# Patient Record
Sex: MALE | Race: WHITE | NOT HISPANIC OR LATINO
[De-identification: names, ages, dates, MRNs, and addresses within clinical notes are randomized per-mention and may not be internally consistent; named-entity substitution may affect disease eponyms.]

---

## 2017-08-01 PROBLEM — Z00.00 ENCOUNTER FOR PREVENTIVE HEALTH EXAMINATION: Status: ACTIVE | Noted: 2017-08-01

## 2017-08-08 ENCOUNTER — RECORD ABSTRACTING (OUTPATIENT)
Age: 77
End: 2017-08-08

## 2017-08-08 DIAGNOSIS — Z78.9 OTHER SPECIFIED HEALTH STATUS: ICD-10-CM

## 2017-08-08 DIAGNOSIS — Z82.49 FAMILY HISTORY OF ISCHEMIC HEART DISEASE AND OTHER DISEASES OF THE CIRCULATORY SYSTEM: ICD-10-CM

## 2017-08-08 DIAGNOSIS — Z98.890 OTHER SPECIFIED POSTPROCEDURAL STATES: ICD-10-CM

## 2017-08-08 DIAGNOSIS — Z95.0 PRESENCE OF CARDIAC PACEMAKER: ICD-10-CM

## 2017-08-08 DIAGNOSIS — F15.90 OTHER STIMULANT USE, UNSPECIFIED, UNCOMPLICATED: ICD-10-CM

## 2017-08-15 ENCOUNTER — APPOINTMENT (OUTPATIENT)
Dept: INTERNAL MEDICINE | Facility: CLINIC | Age: 77
End: 2017-08-15
Payer: COMMERCIAL

## 2017-08-15 VITALS
DIASTOLIC BLOOD PRESSURE: 68 MMHG | HEART RATE: 60 BPM | WEIGHT: 194 LBS | SYSTOLIC BLOOD PRESSURE: 140 MMHG | RESPIRATION RATE: 18 BRPM | BODY MASS INDEX: 31.18 KG/M2 | OXYGEN SATURATION: 98 % | HEIGHT: 66 IN | TEMPERATURE: 98.7 F

## 2017-08-15 PROCEDURE — 99214 OFFICE O/P EST MOD 30 MIN: CPT

## 2017-08-15 RX ORDER — DONEPEZIL HYDROCHLORIDE 5 MG/1
5 TABLET ORAL
Refills: 0 | Status: DISCONTINUED | COMMUNITY
End: 2017-07-31

## 2017-08-15 RX ORDER — SULFAMETHOXAZOLE AND TRIMETHOPRIM 800; 160 MG/1; MG/1
800-160 TABLET ORAL
Qty: 14 | Refills: 0 | Status: COMPLETED | COMMUNITY
Start: 2017-07-19

## 2017-08-15 RX ORDER — LISINOPRIL 2.5 MG/1
2.5 TABLET ORAL
Refills: 0 | Status: DISCONTINUED | COMMUNITY
End: 2017-08-15

## 2017-09-18 ENCOUNTER — LABORATORY RESULT (OUTPATIENT)
Age: 77
End: 2017-09-18

## 2017-09-18 ENCOUNTER — APPOINTMENT (OUTPATIENT)
Dept: INTERNAL MEDICINE | Facility: CLINIC | Age: 77
End: 2017-09-18
Payer: MEDICARE

## 2017-09-18 VITALS
BODY MASS INDEX: 31.66 KG/M2 | HEIGHT: 66 IN | OXYGEN SATURATION: 96 % | DIASTOLIC BLOOD PRESSURE: 70 MMHG | HEART RATE: 60 BPM | TEMPERATURE: 98.2 F | WEIGHT: 197 LBS | RESPIRATION RATE: 18 BRPM | SYSTOLIC BLOOD PRESSURE: 140 MMHG

## 2017-09-18 PROCEDURE — 36415 COLL VENOUS BLD VENIPUNCTURE: CPT

## 2017-09-18 PROCEDURE — 99214 OFFICE O/P EST MOD 30 MIN: CPT | Mod: 25

## 2017-09-23 LAB
ANION GAP SERPL CALC-SCNC: 22 MMOL/L
APPEARANCE: ABNORMAL
BASOPHILS # BLD AUTO: 0.01 K/UL
BASOPHILS NFR BLD AUTO: 0.2 %
BILIRUBIN URINE: NEGATIVE
BLOOD URINE: ABNORMAL
BUN SERPL-MCNC: 23 MG/DL
CALCIUM SERPL-MCNC: 9.6 MG/DL
CHLORIDE SERPL-SCNC: 105 MMOL/L
CO2 SERPL-SCNC: 19 MMOL/L
COLOR: YELLOW
CREAT SERPL-MCNC: 1.04 MG/DL
EOSINOPHIL # BLD AUTO: 0.16 K/UL
EOSINOPHIL NFR BLD AUTO: 3 %
GLUCOSE QUALITATIVE U: NORMAL MG/DL
GLUCOSE SERPL-MCNC: 89 MG/DL
HCT VFR BLD CALC: 40.6 %
HGB BLD-MCNC: 12.5 G/DL
IMM GRANULOCYTES NFR BLD AUTO: 0.2 %
KETONES URINE: NEGATIVE
LEUKOCYTE ESTERASE URINE: ABNORMAL
LYMPHOCYTES # BLD AUTO: 1.54 K/UL
LYMPHOCYTES NFR BLD AUTO: 29.3 %
MAN DIFF?: NORMAL
MCHC RBC-ENTMCNC: 30.8 GM/DL
MCHC RBC-ENTMCNC: 32.1 PG
MCV RBC AUTO: 104.1 FL
MONOCYTES # BLD AUTO: 0.53 K/UL
MONOCYTES NFR BLD AUTO: 10.1 %
NEUTROPHILS # BLD AUTO: 3 K/UL
NEUTROPHILS NFR BLD AUTO: 57.2 %
NITRITE URINE: POSITIVE
PH URINE: 7.5
PLATELET # BLD AUTO: 201 K/UL
POTASSIUM SERPL-SCNC: 5.1 MMOL/L
PROTEIN URINE: 30 MG/DL
RBC # BLD: 3.9 M/UL
RBC # FLD: 16.4 %
SODIUM SERPL-SCNC: 146 MMOL/L
SPECIFIC GRAVITY URINE: 1.02
UROBILINOGEN URINE: NORMAL MG/DL
WBC # FLD AUTO: 5.25 K/UL

## 2017-10-23 ENCOUNTER — APPOINTMENT (OUTPATIENT)
Dept: INTERNAL MEDICINE | Facility: CLINIC | Age: 77
End: 2017-10-23
Payer: MEDICARE

## 2017-10-23 VITALS
SYSTOLIC BLOOD PRESSURE: 130 MMHG | OXYGEN SATURATION: 98 % | DIASTOLIC BLOOD PRESSURE: 60 MMHG | HEART RATE: 60 BPM | HEIGHT: 66 IN | RESPIRATION RATE: 18 BRPM | TEMPERATURE: 98 F | WEIGHT: 199 LBS | BODY MASS INDEX: 31.98 KG/M2

## 2017-10-23 PROCEDURE — 90662 IIV NO PRSV INCREASED AG IM: CPT

## 2017-10-23 PROCEDURE — G0008: CPT

## 2017-10-23 PROCEDURE — 36415 COLL VENOUS BLD VENIPUNCTURE: CPT

## 2017-10-23 PROCEDURE — 99213 OFFICE O/P EST LOW 20 MIN: CPT | Mod: 25

## 2017-10-25 LAB
ANION GAP SERPL CALC-SCNC: 13 MMOL/L
BUN SERPL-MCNC: 23 MG/DL
CALCIUM SERPL-MCNC: 9 MG/DL
CHLORIDE SERPL-SCNC: 104 MMOL/L
CO2 SERPL-SCNC: 25 MMOL/L
CREAT SERPL-MCNC: 0.97 MG/DL
GLUCOSE SERPL-MCNC: 85 MG/DL
POTASSIUM SERPL-SCNC: 4.6 MMOL/L
SODIUM SERPL-SCNC: 142 MMOL/L

## 2017-11-05 ENCOUNTER — RX RENEWAL (OUTPATIENT)
Age: 77
End: 2017-11-05

## 2018-01-08 ENCOUNTER — LABORATORY RESULT (OUTPATIENT)
Age: 78
End: 2018-01-08

## 2018-01-08 ENCOUNTER — APPOINTMENT (OUTPATIENT)
Dept: INTERNAL MEDICINE | Facility: CLINIC | Age: 78
End: 2018-01-08
Payer: MEDICARE

## 2018-01-08 VITALS
HEART RATE: 61 BPM | RESPIRATION RATE: 18 BRPM | WEIGHT: 198 LBS | DIASTOLIC BLOOD PRESSURE: 60 MMHG | SYSTOLIC BLOOD PRESSURE: 138 MMHG | TEMPERATURE: 98.2 F | OXYGEN SATURATION: 97 % | BODY MASS INDEX: 31.96 KG/M2

## 2018-01-08 PROCEDURE — 99214 OFFICE O/P EST MOD 30 MIN: CPT | Mod: 25

## 2018-01-08 PROCEDURE — 36415 COLL VENOUS BLD VENIPUNCTURE: CPT

## 2018-01-08 RX ORDER — CIPROFLOXACIN HYDROCHLORIDE 250 MG/1
250 TABLET, FILM COATED ORAL
Qty: 10 | Refills: 1 | Status: COMPLETED | COMMUNITY
Start: 2018-01-08 | End: 2018-01-18

## 2018-01-10 LAB
ANION GAP SERPL CALC-SCNC: 11 MMOL/L
APPEARANCE: ABNORMAL
BASOPHILS # BLD AUTO: 0.02 K/UL
BASOPHILS NFR BLD AUTO: 0.3 %
BILIRUBIN URINE: NEGATIVE
BLOOD URINE: ABNORMAL
BUN SERPL-MCNC: 26 MG/DL
CALCIUM SERPL-MCNC: 9.5 MG/DL
CHLORIDE SERPL-SCNC: 103 MMOL/L
CO2 SERPL-SCNC: 27 MMOL/L
COLOR: ABNORMAL
CREAT SERPL-MCNC: 1.15 MG/DL
EOSINOPHIL # BLD AUTO: 0.17 K/UL
EOSINOPHIL NFR BLD AUTO: 3 %
GLUCOSE QUALITATIVE U: NEGATIVE MG/DL
GLUCOSE SERPL-MCNC: 107 MG/DL
HCT VFR BLD CALC: 40.4 %
HGB BLD-MCNC: 12.5 G/DL
IMM GRANULOCYTES NFR BLD AUTO: 0.3 %
KETONES URINE: ABNORMAL
LEUKOCYTE ESTERASE URINE: ABNORMAL
LYMPHOCYTES # BLD AUTO: 1.78 K/UL
LYMPHOCYTES NFR BLD AUTO: 31.1 %
MAN DIFF?: NORMAL
MCHC RBC-ENTMCNC: 30.9 GM/DL
MCHC RBC-ENTMCNC: 32.6 PG
MCV RBC AUTO: 105.2 FL
MONOCYTES # BLD AUTO: 0.69 K/UL
MONOCYTES NFR BLD AUTO: 12.1 %
NEUTROPHILS # BLD AUTO: 3.04 K/UL
NEUTROPHILS NFR BLD AUTO: 53.2 %
NITRITE URINE: POSITIVE
PH URINE: 6.5
PLATELET # BLD AUTO: 212 K/UL
POTASSIUM SERPL-SCNC: 5.5 MMOL/L
PROTEIN URINE: 30 MG/DL
RBC # BLD: 3.84 M/UL
RBC # FLD: 15.9 %
SODIUM SERPL-SCNC: 141 MMOL/L
SPECIFIC GRAVITY URINE: 1.02
UROBILINOGEN URINE: 1 MG/DL
WBC # FLD AUTO: 5.72 K/UL

## 2018-01-11 ENCOUNTER — MESSAGE (OUTPATIENT)
Age: 78
End: 2018-01-11

## 2018-04-09 ENCOUNTER — APPOINTMENT (OUTPATIENT)
Dept: INTERNAL MEDICINE | Facility: CLINIC | Age: 78
End: 2018-04-09

## 2018-05-26 ENCOUNTER — LABORATORY RESULT (OUTPATIENT)
Age: 78
End: 2018-05-26

## 2018-05-26 ENCOUNTER — APPOINTMENT (OUTPATIENT)
Dept: INTERNAL MEDICINE | Facility: CLINIC | Age: 78
End: 2018-05-26
Payer: MEDICARE

## 2018-05-26 VITALS
HEART RATE: 60 BPM | TEMPERATURE: 98.1 F | SYSTOLIC BLOOD PRESSURE: 118 MMHG | RESPIRATION RATE: 18 BRPM | WEIGHT: 170 LBS | BODY MASS INDEX: 27.32 KG/M2 | HEIGHT: 66 IN | OXYGEN SATURATION: 94 % | DIASTOLIC BLOOD PRESSURE: 60 MMHG

## 2018-05-26 DIAGNOSIS — K21.9 GASTRO-ESOPHAGEAL REFLUX DISEASE W/OUT ESOPHAGITIS: ICD-10-CM

## 2018-05-26 PROCEDURE — 36415 COLL VENOUS BLD VENIPUNCTURE: CPT

## 2018-05-26 PROCEDURE — 99214 OFFICE O/P EST MOD 30 MIN: CPT | Mod: 25

## 2018-05-26 RX ORDER — OMEPRAZOLE 40 MG/1
40 CAPSULE, DELAYED RELEASE ORAL
Qty: 30 | Refills: 1 | Status: COMPLETED | COMMUNITY

## 2018-05-26 RX ORDER — SULFAMETHOXAZOLE AND TRIMETHOPRIM 800; 160 MG/1; MG/1
800-160 TABLET ORAL TWICE DAILY
Qty: 14 | Refills: 0 | Status: COMPLETED | COMMUNITY
Start: 2018-05-26 | End: 2018-06-02

## 2018-05-26 RX ORDER — MULTIVITAMIN WITH FOLIC ACID 400 MCG
TABLET ORAL
Qty: 90 | Refills: 0 | Status: COMPLETED | COMMUNITY
Start: 2018-05-18

## 2018-05-26 NOTE — HEALTH RISK ASSESSMENT
[Two or more falls in past year] : Patient reported two or more falls in the past year [0] : 2) Feeling down, depressed, or hopeless: Not at all (0) [Intercurrent ED visits] : went to ED [Intercurrent hospitalizations] : was admitted to the hospital  [] : No [[id number]] : 0

## 2018-05-26 NOTE — HISTORY OF PRESENT ILLNESS
[Spouse] : spouse [FreeTextEntry1] : Follow up for chronic medical conditions  [de-identified] : Patient presents for follow up for his chronic medical conditions. He reports compliance with all prescribed medical therapy and dietary restriction. Patient had a slip and 4 months ago and had a left hip fracture. Patient had a partial hip replacement and was discharged to rehab. Patient had a slip and fall there with dislodgement of the prosthesis. Patient was seen at [facility] and was transferred to [facility] and dislodgement of the prosthesis was reduced under anesthesia. Patient as transferred back to rehab where he did well. Patient was discharged home and presently, he has home PT. Patient's wife reports intermittent episodes of nausea, vomiting, and drooling. Patient has had progression of memory loss and wife states that she is concerned about potential CVA in the past few months. No other complaints at present.

## 2018-05-26 NOTE — PLAN
[FreeTextEntry1] : 78 y.o. man with multiple medical comorbidities now with likely recurrent UTI, frailty, and intermittent N/V of unclear etiology\par  - Zofran as needed for N/V\par  - PO PPI\par \par # UTI\par  - Will obtain UA today\par  - Bactrim DS BID for 7 days \par \par # Frailty\par  - LIkely exacerbated with recent hospitalization and prolong rehab stay\par  - Continue with home BP\par  - Home care requisition was signed\par \par Care was discussed in detail with the patient's wife. \par  - Labs done in office

## 2018-05-26 NOTE — PHYSICAL EXAM

## 2018-05-29 LAB
ALBUMIN SERPL ELPH-MCNC: 3.7 G/DL
ALP BLD-CCNC: 121 U/L
ALT SERPL-CCNC: 8 U/L
ANION GAP SERPL CALC-SCNC: 13 MMOL/L
APPEARANCE: CLEAR
AST SERPL-CCNC: 16 U/L
BASOPHILS # BLD AUTO: 0.03 K/UL
BASOPHILS NFR BLD AUTO: 0.4 %
BILIRUB SERPL-MCNC: 0.3 MG/DL
BILIRUBIN URINE: ABNORMAL
BLOOD URINE: NEGATIVE
BUN SERPL-MCNC: 24 MG/DL
CALCIUM SERPL-MCNC: 9.2 MG/DL
CHLORIDE SERPL-SCNC: 104 MMOL/L
CHOLEST SERPL-MCNC: 170 MG/DL
CHOLEST/HDLC SERPL: 3.9 RATIO
CO2 SERPL-SCNC: 23 MMOL/L
COLOR: ABNORMAL
CREAT SERPL-MCNC: 0.87 MG/DL
EOSINOPHIL # BLD AUTO: 0.16 K/UL
EOSINOPHIL NFR BLD AUTO: 2 %
GLUCOSE QUALITATIVE U: NEGATIVE
GLUCOSE SERPL-MCNC: 88 MG/DL
HCT VFR BLD CALC: 39.4 %
HDLC SERPL-MCNC: 44 MG/DL
HGB BLD-MCNC: 12.3 G/DL
IMM GRANULOCYTES NFR BLD AUTO: 0.5 %
KETONES URINE: ABNORMAL
LDLC SERPL CALC-MCNC: 98 MG/DL
LEUKOCYTE ESTERASE URINE: NORMAL
LYMPHOCYTES # BLD AUTO: 1.74 K/UL
LYMPHOCYTES NFR BLD AUTO: 21.6 %
MAN DIFF?: NORMAL
MCHC RBC-ENTMCNC: 31.2 GM/DL
MCHC RBC-ENTMCNC: 33.2 PG
MCV RBC AUTO: 106.2 FL
MONOCYTES # BLD AUTO: 0.65 K/UL
MONOCYTES NFR BLD AUTO: 8.1 %
NEUTROPHILS # BLD AUTO: 5.44 K/UL
NEUTROPHILS NFR BLD AUTO: 67.4 %
NITRITE URINE: NEGATIVE
PH URINE: 5.5
PLATELET # BLD AUTO: 270 K/UL
POTASSIUM SERPL-SCNC: 4.7 MMOL/L
PROT SERPL-MCNC: 6.9 G/DL
PROTEIN URINE: ABNORMAL
RBC # BLD: 3.71 M/UL
RBC # FLD: 16.3 %
SODIUM SERPL-SCNC: 140 MMOL/L
SPECIFIC GRAVITY URINE: 1.03
TRIGL SERPL-MCNC: 142 MG/DL
UROBILINOGEN URINE: 1
WBC # FLD AUTO: 8.06 K/UL

## 2018-07-09 ENCOUNTER — MEDICATION RENEWAL (OUTPATIENT)
Age: 78
End: 2018-07-09

## 2018-08-23 ENCOUNTER — RX RENEWAL (OUTPATIENT)
Age: 78
End: 2018-08-23

## 2018-09-05 ENCOUNTER — APPOINTMENT (OUTPATIENT)
Dept: INTERNAL MEDICINE | Facility: CLINIC | Age: 78
End: 2018-09-05
Payer: MEDICARE

## 2018-09-05 VITALS
SYSTOLIC BLOOD PRESSURE: 106 MMHG | TEMPERATURE: 97.5 F | HEIGHT: 66 IN | OXYGEN SATURATION: 97 % | RESPIRATION RATE: 18 BRPM | HEART RATE: 61 BPM | DIASTOLIC BLOOD PRESSURE: 60 MMHG

## 2018-09-05 DIAGNOSIS — N39.0 URINARY TRACT INFECTION, SITE NOT SPECIFIED: ICD-10-CM

## 2018-09-05 DIAGNOSIS — E66.09 OTHER OBESITY DUE TO EXCESS CALORIES: ICD-10-CM

## 2018-09-05 PROCEDURE — 99214 OFFICE O/P EST MOD 30 MIN: CPT | Mod: 25

## 2018-09-05 PROCEDURE — 36415 COLL VENOUS BLD VENIPUNCTURE: CPT

## 2018-09-05 NOTE — HISTORY OF PRESENT ILLNESS
[FreeTextEntry1] : Follow up for chronic medical conditions  [de-identified] : Patient presents for follow up for his chronic medical conditions. He reports compliance with all prescribed medical therapy and dietary restrictions. Presently, patient is currently undergoing physical therapy twice per week. No complaints at present.

## 2018-09-05 NOTE — COUNSELING
[Healthy eating counseling provided] : healthy eating [Activity counseling provided] : activity [Decrease Portions] : Decrease food portions [Low Salt Diet] : Low salt diet [Walking] : Walking [None] : None [Good understanding] : Patient has a good understanding of lifestyle changes and the steps needed to achieve self management goals

## 2018-09-05 NOTE — HEALTH RISK ASSESSMENT
[Two or more falls in past year] : Patient reported two or more falls in the past year [0] : 2) Feeling down, depressed, or hopeless: Not at all (0) [] : No [[id number]] : 0

## 2018-09-05 NOTE — PHYSICAL EXAM

## 2018-09-05 NOTE — PLAN
[FreeTextEntry1] :  - Labs done in office \par \par # Essential hypertension\par  - Decrease lisinopril to 5 mg PO daily

## 2018-09-10 ENCOUNTER — RESULT REVIEW (OUTPATIENT)
Age: 78
End: 2018-09-10

## 2018-09-10 LAB
ANION GAP SERPL CALC-SCNC: 13 MMOL/L
BUN SERPL-MCNC: 34 MG/DL
CALCIUM SERPL-MCNC: 9.3 MG/DL
CHLORIDE SERPL-SCNC: 103 MMOL/L
CO2 SERPL-SCNC: 24 MMOL/L
CREAT SERPL-MCNC: 1.01 MG/DL
GLUCOSE SERPL-MCNC: 81 MG/DL
POTASSIUM SERPL-SCNC: 5.2 MMOL/L
SODIUM SERPL-SCNC: 140 MMOL/L

## 2018-12-11 ENCOUNTER — APPOINTMENT (OUTPATIENT)
Dept: INTERNAL MEDICINE | Facility: CLINIC | Age: 78
End: 2018-12-11
Payer: MEDICARE

## 2018-12-11 VITALS
TEMPERATURE: 97.6 F | DIASTOLIC BLOOD PRESSURE: 52 MMHG | RESPIRATION RATE: 18 BRPM | SYSTOLIC BLOOD PRESSURE: 112 MMHG | OXYGEN SATURATION: 98 % | HEART RATE: 61 BPM | HEIGHT: 66 IN

## 2018-12-11 DIAGNOSIS — Z87.891 PERSONAL HISTORY OF NICOTINE DEPENDENCE: ICD-10-CM

## 2018-12-11 PROCEDURE — 99215 OFFICE O/P EST HI 40 MIN: CPT | Mod: 25

## 2018-12-11 PROCEDURE — 36415 COLL VENOUS BLD VENIPUNCTURE: CPT

## 2018-12-11 RX ORDER — VITAMIN A 3000 MCG
1000 CAPSULE ORAL
Qty: 90 | Refills: 0 | Status: DISCONTINUED | COMMUNITY
Start: 2018-05-18 | End: 2018-12-11

## 2018-12-11 RX ORDER — ESCITALOPRAM OXALATE 20 MG/1
20 TABLET ORAL DAILY
Qty: 90 | Refills: 1 | Status: DISCONTINUED | COMMUNITY
End: 2018-12-11

## 2018-12-11 RX ORDER — FOLIC ACID 1 MG/1
1 TABLET ORAL
Qty: 90 | Refills: 0 | Status: DISCONTINUED | COMMUNITY
Start: 2018-05-18 | End: 2018-12-11

## 2018-12-11 NOTE — HISTORY OF PRESENT ILLNESS
[Spouse] : spouse [Formal Caregiver] : formal caregiver [FreeTextEntry1] : Follow up for chronic medical conditions  [de-identified] : Patient presents for follow up for his chronic medical conditions. He reports compliance with all prescribed medical therapy and dietary restrictions. Patient is a poor historian, therefore, additional information is obtained from interview of the wife. 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 As per wife, patient has not fallen since starting the Adderall. Wife also reports noticing mood swings with the patient the recent decrease of the lexapro to 10 mg PO daily. Wife also reports noticing symptoms of urinary frequency, incomplete emptying, and hesitancy of several months in duration. No other complaints at present.

## 2018-12-11 NOTE — PHYSICAL EXAM

## 2018-12-11 NOTE — HEALTH RISK ASSESSMENT
[Any fall with injury in past year] : Patient reported fall with injury in the past year [1] : 2) Feeling down, depressed, or hopeless for several days (1) [] : No [de-identified] : Pt relative reports pt falling dislocating his hip in the last fall, 03/18. [FreeTextEntry1] : PT's relative reports feeling of depression, pt on depression medication. [[id number]] : 2

## 2018-12-13 ENCOUNTER — RESULT REVIEW (OUTPATIENT)
Age: 78
End: 2018-12-13

## 2018-12-13 LAB
ALBUMIN SERPL ELPH-MCNC: 4.1 G/DL
ALP BLD-CCNC: 86 U/L
ALT SERPL-CCNC: 10 U/L
ANION GAP SERPL CALC-SCNC: 14 MMOL/L
AST SERPL-CCNC: 13 U/L
BASOPHILS # BLD AUTO: 0.02 K/UL
BASOPHILS NFR BLD AUTO: 0.3 %
BILIRUB SERPL-MCNC: 0.3 MG/DL
BUN SERPL-MCNC: 23 MG/DL
CALCIUM SERPL-MCNC: 9.5 MG/DL
CHLORIDE SERPL-SCNC: 100 MMOL/L
CHOLEST SERPL-MCNC: 153 MG/DL
CHOLEST/HDLC SERPL: 3.3 RATIO
CO2 SERPL-SCNC: 26 MMOL/L
CREAT SERPL-MCNC: 1.06 MG/DL
EOSINOPHIL # BLD AUTO: 0.19 K/UL
EOSINOPHIL NFR BLD AUTO: 3.3 %
GLUCOSE SERPL-MCNC: 84 MG/DL
HCT VFR BLD CALC: 41.9 %
HDLC SERPL-MCNC: 46 MG/DL
HGB BLD-MCNC: 12.8 G/DL
IMM GRANULOCYTES NFR BLD AUTO: 0.3 %
LDLC SERPL CALC-MCNC: 85 MG/DL
LYMPHOCYTES # BLD AUTO: 1.6 K/UL
LYMPHOCYTES NFR BLD AUTO: 27.9 %
MAN DIFF?: NORMAL
MCHC RBC-ENTMCNC: 30.5 GM/DL
MCHC RBC-ENTMCNC: 33.9 PG
MCV RBC AUTO: 110.8 FL
MONOCYTES # BLD AUTO: 0.59 K/UL
MONOCYTES NFR BLD AUTO: 10.3 %
NEUTROPHILS # BLD AUTO: 3.31 K/UL
NEUTROPHILS NFR BLD AUTO: 57.9 %
PLATELET # BLD AUTO: 241 K/UL
POTASSIUM SERPL-SCNC: 5.2 MMOL/L
PROT SERPL-MCNC: 6.8 G/DL
PSA SERPL-MCNC: 3.17 NG/ML
RBC # BLD: 3.78 M/UL
RBC # FLD: 15.1 %
SODIUM SERPL-SCNC: 140 MMOL/L
TRIGL SERPL-MCNC: 109 MG/DL
WBC # FLD AUTO: 5.73 K/UL

## 2018-12-18 LAB
FOLATE SERPL-MCNC: 9.4 NG/ML
METHYLMALONATE SERPL-SCNC: 291 NMOL/L
VIT B12 SERPL-MCNC: 474 PG/ML

## 2019-02-19 ENCOUNTER — MEDICATION RENEWAL (OUTPATIENT)
Age: 79
End: 2019-02-19

## 2019-02-26 ENCOUNTER — RX RENEWAL (OUTPATIENT)
Age: 79
End: 2019-02-26

## 2019-03-04 ENCOUNTER — RX RENEWAL (OUTPATIENT)
Age: 79
End: 2019-03-04

## 2019-03-25 ENCOUNTER — LABORATORY RESULT (OUTPATIENT)
Age: 79
End: 2019-03-25

## 2019-03-26 ENCOUNTER — APPOINTMENT (OUTPATIENT)
Dept: INTERNAL MEDICINE | Facility: CLINIC | Age: 79
End: 2019-03-26
Payer: MEDICARE

## 2019-03-26 VITALS
WEIGHT: 143 LBS | HEART RATE: 61 BPM | BODY MASS INDEX: 22.98 KG/M2 | TEMPERATURE: 97.4 F | RESPIRATION RATE: 18 BRPM | DIASTOLIC BLOOD PRESSURE: 50 MMHG | OXYGEN SATURATION: 99 % | HEIGHT: 66 IN | SYSTOLIC BLOOD PRESSURE: 108 MMHG

## 2019-03-26 DIAGNOSIS — E66.3 OVERWEIGHT: ICD-10-CM

## 2019-03-26 DIAGNOSIS — Z87.898 PERSONAL HISTORY OF OTHER SPECIFIED CONDITIONS: ICD-10-CM

## 2019-03-26 PROCEDURE — 99213 OFFICE O/P EST LOW 20 MIN: CPT | Mod: 25

## 2019-03-26 PROCEDURE — 36415 COLL VENOUS BLD VENIPUNCTURE: CPT

## 2019-03-26 RX ORDER — AMLODIPINE BESYLATE 5 MG/1
5 TABLET ORAL
Qty: 90 | Refills: 1 | Status: DISCONTINUED | COMMUNITY
Start: 1900-01-01 | End: 2019-03-26

## 2019-03-26 RX ORDER — DEXTROAMPHETAMINE SACCHARATE, AMPHETAMINE ASPARTATE, DEXTROAMPHETAMINE SULFATE, AND AMPHETAMINE SULFATE 2.5; 2.5; 2.5; 2.5 MG/1; MG/1; MG/1; MG/1
10 TABLET ORAL DAILY
Refills: 0 | Status: DISCONTINUED | COMMUNITY
End: 2019-03-26

## 2019-03-26 RX ORDER — AMLODIPINE BESYLATE 10 MG/1
10 TABLET ORAL
Qty: 90 | Refills: 0 | Status: DISCONTINUED | COMMUNITY
Start: 2019-02-26 | End: 2019-03-26

## 2019-03-26 NOTE — HEALTH RISK ASSESSMENT
[No falls in past year] : Patient reported no falls in the past year [0] : 2) Feeling down, depressed, or hopeless: Not at all (0) [] : No [[id number]] : 0

## 2019-03-26 NOTE — HISTORY OF PRESENT ILLNESS
[Spouse] : spouse [Formal Caregiver] : formal caregiver [FreeTextEntry1] : Follow up for chronic medical conditions  [de-identified] : Patient presents for follow up for his chronic medical conditions. He reports compliance with all prescribed medical therapy and dietary restrictions. Patient is a poor historian, therefore all pertinent information is obtained from interview of patient's wife.

## 2019-03-26 NOTE — PHYSICAL EXAM
[No Acute Distress] : no acute distress [Well Nourished] : well nourished [Well Developed] : well developed [Well-Appearing] : well-appearing [Normal Sclera/Conjunctiva] : normal sclera/conjunctiva [EOMI] : extraocular movements intact [Normal Outer Ear/Nose] : the outer ears and nose were normal in appearance [Normal Oropharynx] : the oropharynx was normal [No JVD] : no jugular venous distention [Supple] : supple [No Lymphadenopathy] : no lymphadenopathy [Thyroid Normal, No Nodules] : the thyroid was normal and there were no nodules present [No Respiratory Distress] : no respiratory distress  [Clear to Auscultation] : lungs were clear to auscultation bilaterally [No Accessory Muscle Use] : no accessory muscle use [Normal Rate] : normal rate  [Regular Rhythm] : with a regular rhythm [Normal S1, S2] : normal S1 and S2 [No Murmur] : no murmur heard [No Carotid Bruits] : no carotid bruits [No Abdominal Bruit] : a ~M bruit was not heard ~T in the abdomen [No Varicosities] : no varicosities [Pedal Pulses Present] : the pedal pulses are present [No Edema] : there was no peripheral edema [No Extremity Clubbing/Cyanosis] : no extremity clubbing/cyanosis [No Palpable Aorta] : no palpable aorta [Soft] : abdomen soft [Non Tender] : non-tender [Non-distended] : non-distended [No Masses] : no abdominal mass palpated [No HSM] : no HSM [Normal Bowel Sounds] : normal bowel sounds [Normal Posterior Cervical Nodes] : no posterior cervical lymphadenopathy [Normal Anterior Cervical Nodes] : no anterior cervical lymphadenopathy [No CVA Tenderness] : no CVA  tenderness [No Spinal Tenderness] : no spinal tenderness [No Joint Swelling] : no joint swelling [Grossly Normal Strength/Tone] : grossly normal strength/tone [No Rash] : no rash [Normal Gait] : normal gait [Coordination Grossly Intact] : coordination grossly intact [No Focal Deficits] : no focal deficits [Normal Affect] : the affect was normal [Normal Insight/Judgement] : insight and judgment were intact [de-identified] : Patient on wheelchair

## 2019-04-02 LAB
ANION GAP SERPL CALC-SCNC: 13 MMOL/L
BASOPHILS # BLD AUTO: 0.06 K/UL
BASOPHILS NFR BLD AUTO: 1.1 %
BUN SERPL-MCNC: 21 MG/DL
CALCIUM SERPL-MCNC: 9 MG/DL
CHLORIDE SERPL-SCNC: 101 MMOL/L
CO2 SERPL-SCNC: 26 MMOL/L
CREAT SERPL-MCNC: 0.93 MG/DL
EOSINOPHIL # BLD AUTO: 0.18 K/UL
EOSINOPHIL NFR BLD AUTO: 3.4 %
GLUCOSE SERPL-MCNC: 93 MG/DL
HCT VFR BLD CALC: 41.7 %
HGB BLD-MCNC: 12.6 G/DL
IMM GRANULOCYTES NFR BLD AUTO: 0.6 %
LYMPHOCYTES # BLD AUTO: 1.32 K/UL
LYMPHOCYTES NFR BLD AUTO: 25 %
MAN DIFF?: NORMAL
MCHC RBC-ENTMCNC: 30.2 GM/DL
MCHC RBC-ENTMCNC: 34.7 PG
MCV RBC AUTO: 114.9 FL
MONOCYTES # BLD AUTO: 0.59 K/UL
MONOCYTES NFR BLD AUTO: 11.2 %
NEUTROPHILS # BLD AUTO: 3.1 K/UL
NEUTROPHILS NFR BLD AUTO: 58.7 %
PLATELET # BLD AUTO: 228 K/UL
POTASSIUM SERPL-SCNC: 5.1 MMOL/L
RBC # BLD: 3.63 M/UL
RBC # FLD: 15.4 %
SODIUM SERPL-SCNC: 140 MMOL/L
WBC # FLD AUTO: 5.28 K/UL

## 2019-04-18 ENCOUNTER — LABORATORY RESULT (OUTPATIENT)
Age: 79
End: 2019-04-18

## 2019-05-20 ENCOUNTER — RX RENEWAL (OUTPATIENT)
Age: 79
End: 2019-05-20

## 2019-06-04 ENCOUNTER — RX RENEWAL (OUTPATIENT)
Age: 79
End: 2019-06-04

## 2019-06-18 ENCOUNTER — OTHER (OUTPATIENT)
Age: 79
End: 2019-06-18

## 2019-06-18 ENCOUNTER — RX RENEWAL (OUTPATIENT)
Age: 79
End: 2019-06-18

## 2019-06-18 ENCOUNTER — APPOINTMENT (OUTPATIENT)
Dept: INTERNAL MEDICINE | Facility: CLINIC | Age: 79
End: 2019-06-18
Payer: MEDICARE

## 2019-06-18 ENCOUNTER — MEDICATION RENEWAL (OUTPATIENT)
Age: 79
End: 2019-06-18

## 2019-06-18 ENCOUNTER — LABORATORY RESULT (OUTPATIENT)
Age: 79
End: 2019-06-18

## 2019-06-18 VITALS
WEIGHT: 136 LBS | DIASTOLIC BLOOD PRESSURE: 56 MMHG | HEIGHT: 66 IN | RESPIRATION RATE: 18 BRPM | OXYGEN SATURATION: 98 % | HEART RATE: 60 BPM | TEMPERATURE: 97.7 F | SYSTOLIC BLOOD PRESSURE: 116 MMHG | BODY MASS INDEX: 21.86 KG/M2

## 2019-06-18 PROCEDURE — 99215 OFFICE O/P EST HI 40 MIN: CPT | Mod: 25

## 2019-06-18 PROCEDURE — 36415 COLL VENOUS BLD VENIPUNCTURE: CPT

## 2019-06-18 NOTE — HISTORY OF PRESENT ILLNESS
[Spouse] : spouse [Formal Caregiver] : formal caregiver [FreeTextEntry1] : Follow up for chronic medical conditions  [de-identified] : Patient presents for follow up for his chronic medical conditions. As per wife, patient eats 3 meals per day and snacks in between meals. He reports compliance with all prescribed medical therapy and dietary restrictions. His wife also reports that the patient has difficulty with voiding in spite of taking his medications. Wife reports that patient continues to have cough productive of sputum.

## 2019-06-18 NOTE — HEALTH RISK ASSESSMENT
[No falls in past year] : Patient reported no falls in the past year [0] : 2) Feeling down, depressed, or hopeless: Not at all (0) [] : No [de-identified] : No [de-identified] : Physical therapy [[id number]] : 0

## 2019-06-18 NOTE — PHYSICAL EXAM
[No Acute Distress] : no acute distress [Well Nourished] : well nourished [Well Developed] : well developed [Well-Appearing] : well-appearing [Normal Sclera/Conjunctiva] : normal sclera/conjunctiva [EOMI] : extraocular movements intact [Normal Outer Ear/Nose] : the outer ears and nose were normal in appearance [Normal Oropharynx] : the oropharynx was normal [No JVD] : no jugular venous distention [Supple] : supple [No Lymphadenopathy] : no lymphadenopathy [Thyroid Normal, No Nodules] : the thyroid was normal and there were no nodules present [No Respiratory Distress] : no respiratory distress  [Clear to Auscultation] : lungs were clear to auscultation bilaterally [No Accessory Muscle Use] : no accessory muscle use [Normal Rate] : normal rate  [Regular Rhythm] : with a regular rhythm [Normal S1, S2] : normal S1 and S2 [No Murmur] : no murmur heard [No Carotid Bruits] : no carotid bruits [No Abdominal Bruit] : a ~M bruit was not heard ~T in the abdomen [No Varicosities] : no varicosities [Pedal Pulses Present] : the pedal pulses are present [No Edema] : there was no peripheral edema [No Extremity Clubbing/Cyanosis] : no extremity clubbing/cyanosis [No Palpable Aorta] : no palpable aorta [Soft] : abdomen soft [Non Tender] : non-tender [Non-distended] : non-distended [No Masses] : no abdominal mass palpated [No HSM] : no HSM [Normal Bowel Sounds] : normal bowel sounds [Normal Posterior Cervical Nodes] : no posterior cervical lymphadenopathy [Normal Anterior Cervical Nodes] : no anterior cervical lymphadenopathy [No CVA Tenderness] : no CVA  tenderness [No Spinal Tenderness] : no spinal tenderness [No Joint Swelling] : no joint swelling [Grossly Normal Strength/Tone] : grossly normal strength/tone [No Rash] : no rash [Normal Gait] : normal gait [Coordination Grossly Intact] : coordination grossly intact [No Focal Deficits] : no focal deficits [Normal Affect] : the affect was normal [Normal Insight/Judgement] : insight and judgment were intact [de-identified] : Patient on wheelchair  [de-identified] : Decrease range of motion of left shoulder with superior displacement of the lateral edge of the left clavicle.

## 2019-06-18 NOTE — PLAN
[FreeTextEntry1] : - Malignancy work up \par - Short interval follow up \par - Orthopedic surgery evaluation\par  - Labs done in office\par - Further management pending lab results

## 2019-06-18 NOTE — ASSESSMENT
[FreeTextEntry1] : 79 y.o. man with multiple medical comorbidities now with abnormal weight loss and left shoulder discolation

## 2019-06-18 NOTE — REVIEW OF SYSTEMS
[Recent Change In Weight] : ~T recent weight change [Nausea] : nausea [Negative] : Heme/Lymph [FreeTextEntry6] : See HPI  [FreeTextEntry8] : See HPI [FreeTextEntry9] : See HPI

## 2019-06-24 LAB
ALBUMIN SERPL ELPH-MCNC: 3.7 G/DL
ALP BLD-CCNC: 87 U/L
ALT SERPL-CCNC: 12 U/L
ANION GAP SERPL CALC-SCNC: 13 MMOL/L
AST SERPL-CCNC: 18 U/L
BASOPHILS # BLD AUTO: 0.05 K/UL
BASOPHILS NFR BLD AUTO: 0.5 %
BILIRUB SERPL-MCNC: 0.3 MG/DL
BUN SERPL-MCNC: 19 MG/DL
CALCIUM SERPL-MCNC: 8.7 MG/DL
CHLORIDE SERPL-SCNC: 104 MMOL/L
CO2 SERPL-SCNC: 25 MMOL/L
CREAT SERPL-MCNC: 1.02 MG/DL
EOSINOPHIL # BLD AUTO: 0.19 K/UL
EOSINOPHIL NFR BLD AUTO: 2 %
GLUCOSE SERPL-MCNC: 73 MG/DL
HCT VFR BLD CALC: 40.7 %
HGB BLD-MCNC: 12.3 G/DL
IMM GRANULOCYTES NFR BLD AUTO: 0.5 %
LYMPHOCYTES # BLD AUTO: 1.41 K/UL
LYMPHOCYTES NFR BLD AUTO: 15.1 %
MAN DIFF?: NORMAL
MCHC RBC-ENTMCNC: 30.2 GM/DL
MCHC RBC-ENTMCNC: 34.7 PG
MCV RBC AUTO: 115 FL
MONOCYTES # BLD AUTO: 0.65 K/UL
MONOCYTES NFR BLD AUTO: 7 %
NEUTROPHILS # BLD AUTO: 7 K/UL
NEUTROPHILS NFR BLD AUTO: 74.9 %
PLATELET # BLD AUTO: 229 K/UL
POTASSIUM SERPL-SCNC: 4.6 MMOL/L
PROT SERPL-MCNC: 6.3 G/DL
PSA SERPL-MCNC: 2.46 NG/ML
RBC # BLD: 3.54 M/UL
RBC # FLD: 14.9 %
SODIUM SERPL-SCNC: 142 MMOL/L
WBC # FLD AUTO: 9.35 K/UL

## 2019-07-19 ENCOUNTER — APPOINTMENT (OUTPATIENT)
Dept: INTERNAL MEDICINE | Facility: CLINIC | Age: 79
End: 2019-07-19
Payer: MEDICARE

## 2019-07-19 VITALS
OXYGEN SATURATION: 97 % | RESPIRATION RATE: 18 BRPM | HEIGHT: 66 IN | TEMPERATURE: 98.1 F | SYSTOLIC BLOOD PRESSURE: 128 MMHG | DIASTOLIC BLOOD PRESSURE: 60 MMHG | BODY MASS INDEX: 21.86 KG/M2 | HEART RATE: 61 BPM | WEIGHT: 136 LBS

## 2019-07-19 DIAGNOSIS — S43.005A UNSPECIFIED DISLOCATION OF LEFT SHOULDER JOINT, INITIAL ENCOUNTER: ICD-10-CM

## 2019-07-19 DIAGNOSIS — R05 COUGH: ICD-10-CM

## 2019-07-19 DIAGNOSIS — R35.0 FREQUENCY OF MICTURITION: ICD-10-CM

## 2019-07-19 PROCEDURE — 99214 OFFICE O/P EST MOD 30 MIN: CPT | Mod: 25

## 2019-07-19 PROCEDURE — 36415 COLL VENOUS BLD VENIPUNCTURE: CPT

## 2019-07-19 RX ORDER — ONDANSETRON HYDROCHLORIDE 4 MG/1
TABLET, FILM COATED ORAL
Refills: 0 | Status: COMPLETED | COMMUNITY

## 2019-07-19 RX ORDER — NITROFURANTOIN (MONOHYDRATE/MACROCRYSTALS) 25; 75 MG/1; MG/1
100 CAPSULE ORAL
Qty: 14 | Refills: 0 | Status: DISCONTINUED | COMMUNITY
Start: 2019-04-13 | End: 2019-07-19

## 2019-07-19 RX ORDER — LEVOFLOXACIN 500 MG/1
500 TABLET, FILM COATED ORAL DAILY
Qty: 10 | Refills: 0 | Status: COMPLETED | COMMUNITY
Start: 2019-07-19 | End: 2019-07-29

## 2019-07-19 NOTE — PLAN
[FreeTextEntry1] :  - Labs done in office\par - Further management pending lab results\par - Will repeat CT scan of chest in 2-5 months\par - If megaloblastic anemia is ongoing and above work up is negative, will refer to hematology for further evaluation. \par - Orthopedic surgery evaluation

## 2019-07-19 NOTE — HISTORY OF PRESENT ILLNESS
[FreeTextEntry1] : Follow up for chronic medical conditions  [de-identified] : Patient presents for follow up for his chronic medical conditions. Patient continues to cough with productive sputum. Wife also reports noticing frequent urination. No reports of fevers, chills, night sweats, or sick contacts.

## 2019-07-19 NOTE — HEALTH RISK ASSESSMENT
[No] : In the past 12 months have you used drugs other than those required for medical reasons? No [No falls in past year] : Patient reported no falls in the past year [0] : 2) Feeling down, depressed, or hopeless: Not at all (0) [] : No [de-identified] : Neurologist [Audit-CScore] : 0 [de-identified] : No [de-identified] : soft diet [FreeTextEntry1] : Pt takes medication for depression. [[id number]] : 0

## 2019-07-19 NOTE — PHYSICAL EXAM
[No Acute Distress] : no acute distress [Well Nourished] : well nourished [Well Developed] : well developed [Well-Appearing] : well-appearing [No Respiratory Distress] : no respiratory distress  [No Accessory Muscle Use] : no accessory muscle use [Clear to Auscultation] : lungs were clear to auscultation bilaterally [Normal Rate] : normal rate  [Regular Rhythm] : with a regular rhythm [Normal S1, S2] : normal S1 and S2 [No Murmur] : no murmur heard [Soft] : abdomen soft [Normal Supraclavicular Nodes] : no supraclavicular lymphadenopathy [Normal Axillary Nodes] : no axillary lymphadenopathy [Normal Posterior Cervical Nodes] : no posterior cervical lymphadenopathy [Normal Anterior Cervical Nodes] : no anterior cervical lymphadenopathy [de-identified] : Left clavicular dislocation [de-identified] : Ecchymosis noted over b/l upper extremities.

## 2019-07-24 LAB
ANION GAP SERPL CALC-SCNC: 13 MMOL/L
BASOPHILS # BLD AUTO: 0.03 K/UL
BASOPHILS NFR BLD AUTO: 0.4 %
BUN SERPL-MCNC: 22 MG/DL
CALCIUM SERPL-MCNC: 8.9 MG/DL
CHLORIDE SERPL-SCNC: 100 MMOL/L
CO2 SERPL-SCNC: 25 MMOL/L
CREAT SERPL-MCNC: 0.89 MG/DL
EOSINOPHIL # BLD AUTO: 0.28 K/UL
EOSINOPHIL NFR BLD AUTO: 3.9 %
FOLATE SERPL-MCNC: 9.6 NG/ML
GLUCOSE SERPL-MCNC: 91 MG/DL
HCT VFR BLD CALC: 40.7 %
HGB BLD-MCNC: 12.2 G/DL
IMM GRANULOCYTES NFR BLD AUTO: 0.8 %
LYMPHOCYTES # BLD AUTO: 1.26 K/UL
LYMPHOCYTES NFR BLD AUTO: 17.5 %
MAN DIFF?: NORMAL
MCHC RBC-ENTMCNC: 30 GM/DL
MCHC RBC-ENTMCNC: 34.1 PG
MCV RBC AUTO: 113.7 FL
MONOCYTES # BLD AUTO: 0.61 K/UL
MONOCYTES NFR BLD AUTO: 8.5 %
NEUTROPHILS # BLD AUTO: 4.96 K/UL
NEUTROPHILS NFR BLD AUTO: 68.9 %
PLATELET # BLD AUTO: 217 K/UL
POTASSIUM SERPL-SCNC: 4.4 MMOL/L
RBC # BLD: 3.58 M/UL
RBC # FLD: 15.1 %
SODIUM SERPL-SCNC: 138 MMOL/L
TSH SERPL-ACNC: 0.53 UIU/ML
VIT B12 SERPL-MCNC: 527 PG/ML
WBC # FLD AUTO: 7.2 K/UL

## 2019-08-20 ENCOUNTER — RX RENEWAL (OUTPATIENT)
Age: 79
End: 2019-08-20

## 2019-08-21 ENCOUNTER — RX RENEWAL (OUTPATIENT)
Age: 79
End: 2019-08-21

## 2019-09-03 ENCOUNTER — APPOINTMENT (OUTPATIENT)
Dept: INTERNAL MEDICINE | Facility: CLINIC | Age: 79
End: 2019-09-03
Payer: MEDICARE

## 2019-09-03 ENCOUNTER — OTHER (OUTPATIENT)
Age: 79
End: 2019-09-03

## 2019-09-03 ENCOUNTER — LABORATORY RESULT (OUTPATIENT)
Age: 79
End: 2019-09-03

## 2019-09-03 VITALS
DIASTOLIC BLOOD PRESSURE: 60 MMHG | BODY MASS INDEX: 21.86 KG/M2 | RESPIRATION RATE: 16 BRPM | HEIGHT: 66 IN | HEART RATE: 54 BPM | SYSTOLIC BLOOD PRESSURE: 124 MMHG | WEIGHT: 136 LBS | TEMPERATURE: 98.4 F | OXYGEN SATURATION: 98 %

## 2019-09-03 DIAGNOSIS — F32.9 MAJOR DEPRESSIVE DISORDER, SINGLE EPISODE, UNSPECIFIED: ICD-10-CM

## 2019-09-03 PROCEDURE — 36415 COLL VENOUS BLD VENIPUNCTURE: CPT

## 2019-09-03 PROCEDURE — 99214 OFFICE O/P EST MOD 30 MIN: CPT | Mod: 25

## 2019-09-03 NOTE — HEALTH RISK ASSESSMENT
[No] : In the past 12 months have you used drugs other than those required for medical reasons? No [No falls in past year] : Patient reported no falls in the past year [0] : 2) Feeling down, depressed, or hopeless: Not at all (0) [] : No [de-identified] : Urologist, Orthopedic [de-identified] : No [Audit-CScore] : 0 [[id number]] : 0 [de-identified] : Thickened liquids

## 2019-09-03 NOTE — PHYSICAL EXAM
[No Acute Distress] : no acute distress [Well Nourished] : well nourished [Well Developed] : well developed [No Respiratory Distress] : no respiratory distress  [Well-Appearing] : well-appearing [No Accessory Muscle Use] : no accessory muscle use [Clear to Auscultation] : lungs were clear to auscultation bilaterally [Normal Rate] : normal rate  [Regular Rhythm] : with a regular rhythm [Normal S1, S2] : normal S1 and S2 [No Murmur] : no murmur heard [Soft] : abdomen soft [Normal Supraclavicular Nodes] : no supraclavicular lymphadenopathy [Normal Axillary Nodes] : no axillary lymphadenopathy [Normal Posterior Cervical Nodes] : no posterior cervical lymphadenopathy [Normal Anterior Cervical Nodes] : no anterior cervical lymphadenopathy [de-identified] : On wheelchair [de-identified] : Left clavicular dislocation [de-identified] : Ecchymosis noted over b/l upper extremities.

## 2019-09-03 NOTE — HISTORY OF PRESENT ILLNESS
[Spouse] : spouse [Formal Caregiver] : formal caregiver [de-identified] : Patient presents for follow up for his chronic medical conditions. Wife reports that the patient continues to have non-productive cough. Wife also reports noticing frequent urination. No reports of fevers, chills, night sweats, or sick contacts.  [FreeTextEntry1] : Follow up for chronic medical conditions

## 2019-09-11 LAB
ALBUMIN SERPL ELPH-MCNC: 3.7 G/DL
ALP BLD-CCNC: 87 U/L
ALT SERPL-CCNC: 19 U/L
ANION GAP SERPL CALC-SCNC: 14 MMOL/L
AST SERPL-CCNC: 22 U/L
BASOPHILS # BLD AUTO: 0.1 K/UL
BASOPHILS NFR BLD AUTO: 1.7 %
BILIRUB SERPL-MCNC: 0.3 MG/DL
BUN SERPL-MCNC: 26 MG/DL
CALCIUM SERPL-MCNC: 9 MG/DL
CHLORIDE SERPL-SCNC: 105 MMOL/L
CHOLEST SERPL-MCNC: 153 MG/DL
CHOLEST/HDLC SERPL: 2.7 RATIO
CO2 SERPL-SCNC: 25 MMOL/L
CREAT SERPL-MCNC: 1 MG/DL
EOSINOPHIL # BLD AUTO: 0.21 K/UL
EOSINOPHIL NFR BLD AUTO: 3.5 %
GLUCOSE SERPL-MCNC: 83 MG/DL
HCT VFR BLD CALC: 40.8 %
HDLC SERPL-MCNC: 57 MG/DL
HGB BLD-MCNC: 12.3 G/DL
LDLC SERPL CALC-MCNC: 83 MG/DL
LYMPHOCYTES # BLD AUTO: 2.16 K/UL
LYMPHOCYTES NFR BLD AUTO: 36.2 %
MAN DIFF?: NORMAL
MCHC RBC-ENTMCNC: 30.1 GM/DL
MCHC RBC-ENTMCNC: 34.6 PG
MCV RBC AUTO: 114.6 FL
MONOCYTES # BLD AUTO: 0.36 K/UL
MONOCYTES NFR BLD AUTO: 6 %
NEUTROPHILS # BLD AUTO: 3.13 K/UL
NEUTROPHILS NFR BLD AUTO: 52.6 %
PLATELET # BLD AUTO: 184 K/UL
POTASSIUM SERPL-SCNC: 5.1 MMOL/L
PROT SERPL-MCNC: 6.5 G/DL
RBC # BLD: 3.56 M/UL
RBC # FLD: 15.6 %
SODIUM SERPL-SCNC: 144 MMOL/L
TRIGL SERPL-MCNC: 67 MG/DL
WBC # FLD AUTO: 5.96 K/UL

## 2019-09-11 RX ORDER — AMOXICILLIN AND CLAVULANATE POTASSIUM 875; 125 MG/1; MG/1
875-125 TABLET, COATED ORAL
Qty: 20 | Refills: 0 | Status: COMPLETED | COMMUNITY
Start: 2019-09-11 | End: 2019-09-21

## 2019-11-18 ENCOUNTER — APPOINTMENT (OUTPATIENT)
Dept: INTERNAL MEDICINE | Facility: CLINIC | Age: 79
End: 2019-11-18
Payer: MEDICARE

## 2019-11-18 ENCOUNTER — LABORATORY RESULT (OUTPATIENT)
Age: 79
End: 2019-11-18

## 2019-11-18 VITALS
SYSTOLIC BLOOD PRESSURE: 104 MMHG | BODY MASS INDEX: 22.66 KG/M2 | OXYGEN SATURATION: 99 % | HEIGHT: 66 IN | WEIGHT: 141 LBS | DIASTOLIC BLOOD PRESSURE: 56 MMHG | TEMPERATURE: 98.1 F | HEART RATE: 60 BPM | RESPIRATION RATE: 16 BRPM

## 2019-11-18 DIAGNOSIS — R91.8 OTHER NONSPECIFIC ABNORMAL FINDING OF LUNG FIELD: ICD-10-CM

## 2019-11-18 DIAGNOSIS — Z87.01 PERSONAL HISTORY OF PNEUMONIA (RECURRENT): ICD-10-CM

## 2019-11-18 DIAGNOSIS — G23.1: ICD-10-CM

## 2019-11-18 DIAGNOSIS — R54 AGE-RELATED PHYSICAL DEBILITY: ICD-10-CM

## 2019-11-18 PROCEDURE — 99214 OFFICE O/P EST MOD 30 MIN: CPT | Mod: 25

## 2019-11-18 PROCEDURE — 36415 COLL VENOUS BLD VENIPUNCTURE: CPT

## 2019-11-18 RX ORDER — LISINOPRIL 5 MG/1
5 TABLET ORAL
Qty: 90 | Refills: 1 | Status: DISCONTINUED | COMMUNITY
Start: 2017-11-05 | End: 2019-11-18

## 2019-11-18 RX ORDER — ESCITALOPRAM OXALATE 10 MG/1
10 TABLET, FILM COATED ORAL DAILY
Refills: 0 | Status: DISCONTINUED | COMMUNITY
End: 2019-11-17

## 2019-11-18 NOTE — PLAN
[FreeTextEntry1] : - Neurology follow up\par  - Labs done in office\par - Further management pending lab results

## 2019-11-18 NOTE — HISTORY OF PRESENT ILLNESS
[Spouse] : spouse [Formal Caregiver] : formal caregiver [FreeTextEntry1] : Follow up for chronic medical conditions  [de-identified] : Patient presents for follow up for his chronic medical conditions. Wife reports that the patient has worsening weakness and ability to ambulate. 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 Otherwise, he reports compliance with all prescribed medical therapy. Patient's wife is requesting prescription for new wheelchair and ongoing PT. No other complaints at present.

## 2019-11-18 NOTE — PHYSICAL EXAM
[Well Nourished] : well nourished [No Acute Distress] : no acute distress [No Respiratory Distress] : no respiratory distress  [Well-Appearing] : well-appearing [Well Developed] : well developed [No Accessory Muscle Use] : no accessory muscle use [Clear to Auscultation] : lungs were clear to auscultation bilaterally [Regular Rhythm] : with a regular rhythm [Normal Rate] : normal rate  [Normal S1, S2] : normal S1 and S2 [No Murmur] : no murmur heard [Soft] : abdomen soft [Normal Supraclavicular Nodes] : no supraclavicular lymphadenopathy [Normal Posterior Cervical Nodes] : no posterior cervical lymphadenopathy [Normal Axillary Nodes] : no axillary lymphadenopathy [Normal Anterior Cervical Nodes] : no anterior cervical lymphadenopathy [de-identified] : Left clavicular dislocation [de-identified] : On wheelchair [de-identified] : Ecchymosis noted over b/l upper extremities.

## 2019-11-18 NOTE — HEALTH RISK ASSESSMENT
[No] : In the past 12 months have you used drugs other than those required for medical reasons? No [One fall no injury in past year] : Patient reported one fall in the past year without injury [0] : 2) Feeling down, depressed, or hopeless: Not at all (0) [] : No [de-identified] : none [de-identified] : none [Audit-CScore] : 0 [de-identified] : none [[id number]] : 0

## 2019-11-18 NOTE — ASSESSMENT
[FreeTextEntry1] : 79 y.o. man with multiple medical comorbidities now with progressive physical deterioration secondary to progressive supranuclear palsy

## 2019-11-18 NOTE — COUNSELING
[Limited decision making ability] : Limited decision making ability [Needs reinforcement, provided] : Patient needs reinforcement on understanding of lifestyle changes and steps needed to achieve self management goal; reinforcement was provided

## 2019-11-26 ENCOUNTER — CLINICAL ADVICE (OUTPATIENT)
Age: 79
End: 2019-11-26

## 2019-12-09 LAB
BASOPHILS # BLD AUTO: 0.05 K/UL
BASOPHILS NFR BLD AUTO: 0.6 %
EOSINOPHIL # BLD AUTO: 0.23 K/UL
EOSINOPHIL NFR BLD AUTO: 2.8 %
FERRITIN SERPL-MCNC: 78 NG/ML
FOLATE SERPL-MCNC: 12.4 NG/ML
HCT VFR BLD CALC: 43.2 %
HGB BLD-MCNC: 13.1 G/DL
IMM GRANULOCYTES NFR BLD AUTO: 0.2 %
IRON SATN MFR SERPL: 33 %
IRON SERPL-MCNC: 103 UG/DL
LYMPHOCYTES # BLD AUTO: 1.31 K/UL
LYMPHOCYTES NFR BLD AUTO: 16.2 %
MAN DIFF?: NORMAL
MCHC RBC-ENTMCNC: 30.3 GM/DL
MCHC RBC-ENTMCNC: 34.4 PG
MCV RBC AUTO: 113.4 FL
MONOCYTES # BLD AUTO: 0.78 K/UL
MONOCYTES NFR BLD AUTO: 9.6 %
NEUTROPHILS # BLD AUTO: 5.71 K/UL
NEUTROPHILS NFR BLD AUTO: 70.6 %
PLATELET # BLD AUTO: 198 K/UL
RBC # BLD: 3.81 M/UL
RBC # FLD: 14.8 %
TIBC SERPL-MCNC: 311 UG/DL
TRANSFERRIN SERPL-MCNC: 236 MG/DL
TSH SERPL-ACNC: 0.59 UIU/ML
UIBC SERPL-MCNC: 208 UG/DL
VIT B12 SERPL-MCNC: 449 PG/ML
WBC # FLD AUTO: 8.1 K/UL

## 2020-01-01 ENCOUNTER — APPOINTMENT (OUTPATIENT)
Dept: INTERNAL MEDICINE | Facility: CLINIC | Age: 80
End: 2020-01-01
Payer: MEDICARE

## 2020-01-01 ENCOUNTER — APPOINTMENT (OUTPATIENT)
Dept: INTERNAL MEDICINE | Facility: CLINIC | Age: 80
End: 2020-01-01

## 2020-01-01 ENCOUNTER — LABORATORY RESULT (OUTPATIENT)
Age: 80
End: 2020-01-01

## 2020-01-01 VITALS
OXYGEN SATURATION: 97 % | HEIGHT: 66 IN | TEMPERATURE: 97.5 F | HEART RATE: 93 BPM | BODY MASS INDEX: 20.57 KG/M2 | WEIGHT: 128 LBS

## 2020-01-01 VITALS
WEIGHT: 128 LBS | HEIGHT: 66 IN | BODY MASS INDEX: 20.57 KG/M2 | SYSTOLIC BLOOD PRESSURE: 134 MMHG | DIASTOLIC BLOOD PRESSURE: 72 MMHG | HEART RATE: 93 BPM | TEMPERATURE: 97.5 F | OXYGEN SATURATION: 97 % | RESPIRATION RATE: 18 BRPM

## 2020-01-01 VITALS
TEMPERATURE: 98.6 F | WEIGHT: 130 LBS | HEART RATE: 64 BPM | HEIGHT: 66 IN | BODY MASS INDEX: 20.89 KG/M2 | OXYGEN SATURATION: 96 %

## 2020-01-01 DIAGNOSIS — I10 ESSENTIAL (PRIMARY) HYPERTENSION: ICD-10-CM

## 2020-01-01 DIAGNOSIS — G40.309 GENERALIZED IDIOPATHIC EPILEPSY AND EPILEPTIC SYNDROMES, NOT INTRACTABLE, W/OUT STATUS EPILEPTICUS: Chronic | ICD-10-CM

## 2020-01-01 DIAGNOSIS — J41.1 MUCOPURULENT CHRONIC BRONCHITIS: ICD-10-CM

## 2020-01-01 DIAGNOSIS — Z87.898 PERSONAL HISTORY OF OTHER SPECIFIED CONDITIONS: ICD-10-CM

## 2020-01-01 DIAGNOSIS — R39.9 UNSPECIFIED SYMPTOMS AND SIGNS INVOLVING THE GENITOURINARY SYSTEM: ICD-10-CM

## 2020-01-01 DIAGNOSIS — F33.0 MAJOR DEPRESSIVE DISORDER, RECURRENT, MILD: Chronic | ICD-10-CM

## 2020-01-01 LAB
ALBUMIN SERPL ELPH-MCNC: 3.9 G/DL
ALP BLD-CCNC: 81 U/L
ALT SERPL-CCNC: 26 U/L
ANION GAP SERPL CALC-SCNC: 11 MMOL/L
ANION GAP SERPL CALC-SCNC: 12 MMOL/L
APPEARANCE: ABNORMAL
AST SERPL-CCNC: 28 U/L
BASOPHILS # BLD AUTO: 0.05 K/UL
BASOPHILS NFR BLD AUTO: 0.6 %
BILIRUB SERPL-MCNC: 0.4 MG/DL
BILIRUBIN URINE: NEGATIVE
BLOOD URINE: ABNORMAL
BUN SERPL-MCNC: 21 MG/DL
BUN SERPL-MCNC: 26 MG/DL
CALCIUM SERPL-MCNC: 8.8 MG/DL
CALCIUM SERPL-MCNC: 8.8 MG/DL
CHLORIDE SERPL-SCNC: 102 MMOL/L
CHLORIDE SERPL-SCNC: 103 MMOL/L
CHOLEST SERPL-MCNC: 175 MG/DL
CO2 SERPL-SCNC: 27 MMOL/L
CO2 SERPL-SCNC: 27 MMOL/L
COLOR: NORMAL
CREAT SERPL-MCNC: 0.96 MG/DL
CREAT SERPL-MCNC: 1.02 MG/DL
EOSINOPHIL # BLD AUTO: 0.22 K/UL
EOSINOPHIL NFR BLD AUTO: 2.6 %
GLUCOSE QUALITATIVE U: NEGATIVE
GLUCOSE SERPL-MCNC: 73 MG/DL
GLUCOSE SERPL-MCNC: 76 MG/DL
HCT VFR BLD CALC: 40.5 %
HDLC SERPL-MCNC: 69 MG/DL
HGB BLD-MCNC: 12.5 G/DL
IMM GRANULOCYTES NFR BLD AUTO: 0.4 %
KETONES URINE: NEGATIVE
LDLC SERPL CALC-MCNC: 92 MG/DL
LEUKOCYTE ESTERASE URINE: ABNORMAL
LYMPHOCYTES # BLD AUTO: 1.36 K/UL
LYMPHOCYTES NFR BLD AUTO: 16.3 %
MAN DIFF?: NORMAL
MCHC RBC-ENTMCNC: 30.9 GM/DL
MCHC RBC-ENTMCNC: 34.1 PG
MCV RBC AUTO: 110.4 FL
MONOCYTES # BLD AUTO: 0.73 K/UL
MONOCYTES NFR BLD AUTO: 8.7 %
NEUTROPHILS # BLD AUTO: 5.96 K/UL
NEUTROPHILS NFR BLD AUTO: 71.4 %
NITRITE URINE: NEGATIVE
NONHDLC SERPL-MCNC: 107 MG/DL
PH URINE: 6
PLATELET # BLD AUTO: 178 K/UL
POTASSIUM SERPL-SCNC: 4.4 MMOL/L
POTASSIUM SERPL-SCNC: 4.4 MMOL/L
PROT SERPL-MCNC: 6.4 G/DL
PROTEIN URINE: ABNORMAL
RBC # BLD: 3.67 M/UL
RBC # FLD: 15.4 %
SODIUM SERPL-SCNC: 140 MMOL/L
SODIUM SERPL-SCNC: 140 MMOL/L
SPECIFIC GRAVITY URINE: 1.02
TRIGL SERPL-MCNC: 73 MG/DL
UROBILINOGEN URINE: NORMAL
WBC # FLD AUTO: 8.35 K/UL

## 2020-01-01 PROCEDURE — 36415 COLL VENOUS BLD VENIPUNCTURE: CPT

## 2020-01-01 PROCEDURE — 99214 OFFICE O/P EST MOD 30 MIN: CPT | Mod: 25

## 2020-01-01 RX ORDER — LEVOFLOXACIN 500 MG/1
500 TABLET, FILM COATED ORAL
Qty: 7 | Refills: 0 | Status: COMPLETED | COMMUNITY
Start: 2020-01-01 | End: 2020-01-01

## 2020-01-01 RX ORDER — LACTOBACILLUS COMBINATION NO.4 3B CELL
CAPSULE ORAL
Qty: 30 | Refills: 0 | Status: COMPLETED | COMMUNITY
Start: 2020-01-01 | End: 2020-01-01

## 2020-01-01 RX ORDER — ONDANSETRON 4 MG/1
4 TABLET ORAL
Qty: 30 | Refills: 3 | Status: DISCONTINUED | COMMUNITY
Start: 2018-05-26 | End: 2020-01-01

## 2020-01-21 ENCOUNTER — APPOINTMENT (OUTPATIENT)
Dept: INTERNAL MEDICINE | Facility: CLINIC | Age: 80
End: 2020-01-21
Payer: MEDICARE

## 2020-01-21 VITALS
DIASTOLIC BLOOD PRESSURE: 68 MMHG | BODY MASS INDEX: 22.5 KG/M2 | OXYGEN SATURATION: 98 % | RESPIRATION RATE: 18 BRPM | WEIGHT: 140 LBS | HEART RATE: 62 BPM | HEIGHT: 66 IN | SYSTOLIC BLOOD PRESSURE: 122 MMHG | TEMPERATURE: 97.6 F

## 2020-01-21 DIAGNOSIS — D53.1 OTHER MEGALOBLASTIC ANEMIAS, NOT ELSEWHERE CLASSIFIED: ICD-10-CM

## 2020-01-21 DIAGNOSIS — F03.90 UNSPECIFIED DEMENTIA W/OUT BEHAVIORAL DISTURBANCE: ICD-10-CM

## 2020-01-21 DIAGNOSIS — N40.1 BENIGN PROSTATIC HYPERPLASIA WITH LOWER URINARY TRACT SYMPMS: ICD-10-CM

## 2020-01-21 DIAGNOSIS — R91.1 SOLITARY PULMONARY NODULE: ICD-10-CM

## 2020-01-21 PROCEDURE — 36415 COLL VENOUS BLD VENIPUNCTURE: CPT

## 2020-01-21 PROCEDURE — 99214 OFFICE O/P EST MOD 30 MIN: CPT | Mod: 25

## 2020-01-21 NOTE — HEALTH RISK ASSESSMENT
[No] : In the past 12 months have you used drugs other than those required for medical reasons? No [One fall no injury in past year] : Patient reported one fall in the past year without injury [0] : 2) Feeling down, depressed, or hopeless: Not at all (0) [] : No [de-identified] : neurologist  [Audit-CScore] : 0 [de-identified] : none [de-identified] : normal [[id number]] : 0

## 2020-01-21 NOTE — PHYSICAL EXAM
[No Acute Distress] : no acute distress [Well Nourished] : well nourished [Well Developed] : well developed [Well-Appearing] : well-appearing [No Respiratory Distress] : no respiratory distress  [Clear to Auscultation] : lungs were clear to auscultation bilaterally [No Accessory Muscle Use] : no accessory muscle use [Normal Rate] : normal rate  [Regular Rhythm] : with a regular rhythm [Normal S1, S2] : normal S1 and S2 [No Murmur] : no murmur heard [Soft] : abdomen soft [Normal Supraclavicular Nodes] : no supraclavicular lymphadenopathy [Normal Axillary Nodes] : no axillary lymphadenopathy [Normal Posterior Cervical Nodes] : no posterior cervical lymphadenopathy [Normal Anterior Cervical Nodes] : no anterior cervical lymphadenopathy [de-identified] : On wheelchair [de-identified] : Left clavicular dislocation [de-identified] : Ecchymosis noted over b/l upper extremities.

## 2020-01-21 NOTE — HISTORY OF PRESENT ILLNESS
[FreeTextEntry1] : Follow up for chronic medical conditions  [de-identified] : Patient presents for follow up for his chronic medical conditions. He reports compliance with all prescribed medical therapy and dietary restrictions. No complaints at present.

## 2020-01-27 LAB
ALBUMIN SERPL ELPH-MCNC: 4 G/DL
ALP BLD-CCNC: 84 U/L
ALT SERPL-CCNC: 13 U/L
ANION GAP SERPL CALC-SCNC: 14 MMOL/L
AST SERPL-CCNC: 21 U/L
BASOPHILS # BLD AUTO: 0.05 K/UL
BASOPHILS NFR BLD AUTO: 0.9 %
BILIRUB SERPL-MCNC: 0.4 MG/DL
BUN SERPL-MCNC: 23 MG/DL
CALCIUM SERPL-MCNC: 8.8 MG/DL
CHLORIDE SERPL-SCNC: 102 MMOL/L
CHOLEST SERPL-MCNC: 168 MG/DL
CHOLEST/HDLC SERPL: 2.6 RATIO
CO2 SERPL-SCNC: 25 MMOL/L
CREAT SERPL-MCNC: 1.14 MG/DL
EOSINOPHIL # BLD AUTO: 0.22 K/UL
EOSINOPHIL NFR BLD AUTO: 4.1 %
GLUCOSE SERPL-MCNC: 74 MG/DL
HCT VFR BLD CALC: 41.6 %
HDLC SERPL-MCNC: 65 MG/DL
HGB BLD-MCNC: 12.9 G/DL
IMM GRANULOCYTES NFR BLD AUTO: 0.4 %
LDLC SERPL CALC-MCNC: 90 MG/DL
LYMPHOCYTES # BLD AUTO: 1.34 K/UL
LYMPHOCYTES NFR BLD AUTO: 24.7 %
MAN DIFF?: NORMAL
MCHC RBC-ENTMCNC: 31 GM/DL
MCHC RBC-ENTMCNC: 34.3 PG
MCV RBC AUTO: 110.6 FL
MONOCYTES # BLD AUTO: 0.65 K/UL
MONOCYTES NFR BLD AUTO: 12 %
NEUTROPHILS # BLD AUTO: 3.15 K/UL
NEUTROPHILS NFR BLD AUTO: 57.9 %
PLATELET # BLD AUTO: 198 K/UL
POTASSIUM SERPL-SCNC: 4.6 MMOL/L
PROT SERPL-MCNC: 6.4 G/DL
RBC # BLD: 3.76 M/UL
RBC # FLD: 14.8 %
SODIUM SERPL-SCNC: 141 MMOL/L
TRIGL SERPL-MCNC: 67 MG/DL
WBC # FLD AUTO: 5.43 K/UL

## 2020-03-16 NOTE — PLAN
14-Mar-2020 15:33 [FreeTextEntry1] : - Decrease CCB in view of low BP\par - Start on tamsulosin for likely symptomatic BPH\par - Neurology evaluation\par - Continue with all other treatments\par  - Labs done in office

## 2020-06-03 NOTE — HISTORY OF PRESENT ILLNESS
[Spouse] : spouse [FreeTextEntry1] : Follow up for chronic medical conditions  [Formal Caregiver] : formal caregiver [de-identified] : Patient presents for follow up for his chronic medical conditions. Patient is a poor historian, therefore, all pertinent information is obtain from wife. As per wife, the patient has cough with occasional sputum production. Patient at baseline had dysphagia to liquids. As per wife, the patient had a recent febrile episodes. Presently, she reports that he has foul smelling urine. He was recently treatment for a UTI by his urologist with bactrim DS, however, the symptoms have returned.

## 2020-06-03 NOTE — HEALTH RISK ASSESSMENT
[No] : In the past 12 months have you used drugs other than those required for medical reasons? No [Two or more falls in past year] : Patient reported two or more falls in the past year [Assistive Device] : Patient uses an assistive device [0] : 2) Feeling down, depressed, or hopeless: Not at all (0) [de-identified] : No [] : No [de-identified] : Wheel chair, walker [Audit-CScore] : 0 [de-identified] : None [[id number]] : 0

## 2020-06-03 NOTE — PLAN
[FreeTextEntry1] : - Start on Levaquin \par - Chest PT\par - Feed tube was discussed with wife and patient and at present, she is not in agreement with this\par  - Labs done in office\par - Further management pending lab results

## 2020-06-03 NOTE — PHYSICAL EXAM
[No Acute Distress] : no acute distress [Well Nourished] : well nourished [Well Developed] : well developed [No Respiratory Distress] : no respiratory distress  [Well-Appearing] : well-appearing [Normal Rate] : normal rate  [Regular Rhythm] : with a regular rhythm [Normal S1, S2] : normal S1 and S2 [No Murmur] : no murmur heard [Normal Supraclavicular Nodes] : no supraclavicular lymphadenopathy [Soft] : abdomen soft [Normal Posterior Cervical Nodes] : no posterior cervical lymphadenopathy [Normal Axillary Nodes] : no axillary lymphadenopathy [de-identified] : On wheelchair [Normal Anterior Cervical Nodes] : no anterior cervical lymphadenopathy [de-identified] : Ecchymosis noted over b/l upper extremities.  [de-identified] : Left clavicular dislocation [de-identified] : Coarse breath sounds noted in all lung fields.

## 2020-06-03 NOTE — ASSESSMENT
[FreeTextEntry1] : 80 y.o. man with multiple medical comorbidities now with likely UTI and aspiration pneumonia

## 2020-10-21 PROBLEM — G40.309 NONCONVULSIVE GENERALIZED SEIZURE DISORDER: Chronic | Status: ACTIVE | Noted: 2017-08-08

## 2020-10-21 PROBLEM — Z87.898 HISTORY OF ABNORMAL WEIGHT LOSS: Status: RESOLVED | Noted: 2019-06-18 | Resolved: 2020-01-01

## 2020-10-21 PROBLEM — F33.0 MILD EPISODE OF RECURRENT MAJOR DEPRESSIVE DISORDER: Chronic | Status: ACTIVE | Noted: 2017-08-08

## 2020-10-21 PROBLEM — R39.9 UTI SYMPTOMS: Status: RESOLVED | Noted: 2017-09-18 | Resolved: 2020-01-01

## 2020-10-21 PROBLEM — J41.1 MUCOPURULENT CHRONIC BRONCHITIS: Status: RESOLVED | Noted: 2019-07-19 | Resolved: 2020-01-01

## 2020-10-21 NOTE — HISTORY OF PRESENT ILLNESS
[Spouse] : spouse [Formal Caregiver] : formal caregiver [FreeTextEntry1] : Follow up for chronic medical conditions  [de-identified] : Patient presents for follow up for his chronic medical conditions. Patient is a poor historian, therefore, all pertinent information is obtain from wife. As per wife, the patient at baseline had dysphagia to liquids. He reports compliance with all prescribed medical therapy. Worsening rigidity and patient is unable to tolerate treatment secondary to side effects of treatments with parkinson's disease medications.  No other complaints at present.

## 2020-10-21 NOTE — PLAN
[FreeTextEntry1] :  - Continue with all current treatments.\par  - Labs done in office\par - Further management pending lab results \par - Neurology follow up

## 2020-10-21 NOTE — HEALTH RISK ASSESSMENT
[No] : In the past 12 months have you used drugs other than those required for medical reasons? No [One fall no injury in past year] : Patient reported one fall in the past year without injury [Assistive Device] : Patient uses an assistive device [0] : 2) Feeling down, depressed, or hopeless: Not at all (0) [] : No [de-identified] : neurologist  [de-identified] : none [de-identified] : regular  [de-identified] : Wheelchair  [[id number]] : 0

## 2020-10-21 NOTE — PHYSICAL EXAM
[No Acute Distress] : no acute distress [Well Nourished] : well nourished [Well Developed] : well developed [Well-Appearing] : well-appearing [Normal Sclera/Conjunctiva] : normal sclera/conjunctiva [EOMI] : extraocular movements intact [Normal Outer Ear/Nose] : the outer ears and nose were normal in appearance [No Respiratory Distress] : no respiratory distress  [No Accessory Muscle Use] : no accessory muscle use [Clear to Auscultation] : lungs were clear to auscultation bilaterally [Normal Rate] : normal rate  [Regular Rhythm] : with a regular rhythm [Normal S1, S2] : normal S1 and S2 [No Murmur] : no murmur heard [Soft] : abdomen soft [Normal Supraclavicular Nodes] : no supraclavicular lymphadenopathy [Normal Axillary Nodes] : no axillary lymphadenopathy [Normal Posterior Cervical Nodes] : no posterior cervical lymphadenopathy [Normal Anterior Cervical Nodes] : no anterior cervical lymphadenopathy [Speech Grossly Normal] : speech grossly normal [Memory Grossly Normal] : memory grossly normal [Normal Affect] : the affect was normal [Alert and Oriented x3] : oriented to person, place, and time [Normal Mood] : the mood was normal [Normal Insight/Judgement] : insight and judgment were intact [de-identified] : On wheelchair [de-identified] : Patient wearing protective face mask  [de-identified] : Left clavicular dislocation [de-identified] : Ecchymosis noted over b/l upper extremities.

## 2020-10-21 NOTE — ASSESSMENT
[FreeTextEntry1] : 80 y.o. man with multiple medical comorbidities now with progressive neurological dysfunction.

## 2021-01-01 ENCOUNTER — RESULT CHARGE (OUTPATIENT)
Age: 81
End: 2021-01-01

## 2021-01-01 ENCOUNTER — APPOINTMENT (OUTPATIENT)
Dept: INTERNAL MEDICINE | Facility: CLINIC | Age: 81
End: 2021-01-01

## 2021-01-01 ENCOUNTER — TRANSCRIPTION ENCOUNTER (OUTPATIENT)
Age: 81
End: 2021-01-01

## 2021-01-01 ENCOUNTER — NON-APPOINTMENT (OUTPATIENT)
Age: 81
End: 2021-01-01

## 2021-01-01 ENCOUNTER — INPATIENT (INPATIENT)
Facility: HOSPITAL | Age: 81
LOS: 17 days | Discharge: HOSPICE | End: 2021-02-07
Attending: FAMILY MEDICINE | Admitting: FAMILY MEDICINE
Payer: MEDICARE

## 2021-01-01 VITALS
SYSTOLIC BLOOD PRESSURE: 134 MMHG | DIASTOLIC BLOOD PRESSURE: 60 MMHG | OXYGEN SATURATION: 100 % | RESPIRATION RATE: 18 BRPM | HEART RATE: 72 BPM | TEMPERATURE: 98 F

## 2021-01-01 VITALS
SYSTOLIC BLOOD PRESSURE: 148 MMHG | TEMPERATURE: 99 F | HEART RATE: 78 BPM | RESPIRATION RATE: 18 BRPM | DIASTOLIC BLOOD PRESSURE: 85 MMHG | OXYGEN SATURATION: 97 %

## 2021-01-01 DIAGNOSIS — U07.1 COVID-19: ICD-10-CM

## 2021-01-01 DIAGNOSIS — S31.000A UNSPECIFIED OPEN WOUND OF LOWER BACK AND PELVIS WITHOUT PENETRATION INTO RETROPERITONEUM, INITIAL ENCOUNTER: ICD-10-CM

## 2021-01-01 DIAGNOSIS — R09.02 HYPOXEMIA: ICD-10-CM

## 2021-01-01 DIAGNOSIS — R41.82 ALTERED MENTAL STATUS, UNSPECIFIED: ICD-10-CM

## 2021-01-01 DIAGNOSIS — R65.10 SYSTEMIC INFLAMMATORY RESPONSE SYNDROME (SIRS) OF NON-INFECTIOUS ORIGIN WITHOUT ACUTE ORGAN DYSFUNCTION: ICD-10-CM

## 2021-01-01 DIAGNOSIS — Z90.49 ACQUIRED ABSENCE OF OTHER SPECIFIED PARTS OF DIGESTIVE TRACT: Chronic | ICD-10-CM

## 2021-01-01 DIAGNOSIS — Z51.5 ENCOUNTER FOR PALLIATIVE CARE: ICD-10-CM

## 2021-01-01 DIAGNOSIS — E87.0 HYPEROSMOLALITY AND HYPERNATREMIA: ICD-10-CM

## 2021-01-01 DIAGNOSIS — Z71.89 OTHER SPECIFIED COUNSELING: ICD-10-CM

## 2021-01-01 DIAGNOSIS — I10 ESSENTIAL (PRIMARY) HYPERTENSION: ICD-10-CM

## 2021-01-01 DIAGNOSIS — R13.10 DYSPHAGIA, UNSPECIFIED: ICD-10-CM

## 2021-01-01 DIAGNOSIS — G20 PARKINSON'S DISEASE: ICD-10-CM

## 2021-01-01 DIAGNOSIS — Z29.9 ENCOUNTER FOR PROPHYLACTIC MEASURES, UNSPECIFIED: ICD-10-CM

## 2021-01-01 DIAGNOSIS — R30.0 DYSURIA: ICD-10-CM

## 2021-01-01 DIAGNOSIS — E46 UNSPECIFIED PROTEIN-CALORIE MALNUTRITION: ICD-10-CM

## 2021-01-01 DIAGNOSIS — Z87.438 PERSONAL HISTORY OF OTHER DISEASES OF MALE GENITAL ORGANS: ICD-10-CM

## 2021-01-01 DIAGNOSIS — N17.9 ACUTE KIDNEY FAILURE, UNSPECIFIED: ICD-10-CM

## 2021-01-01 DIAGNOSIS — J96.00 ACUTE RESPIRATORY FAILURE, UNSPECIFIED WHETHER WITH HYPOXIA OR HYPERCAPNIA: ICD-10-CM

## 2021-01-01 DIAGNOSIS — R80.9 PROTEINURIA, UNSPECIFIED: ICD-10-CM

## 2021-01-01 DIAGNOSIS — Z98.84 BARIATRIC SURGERY STATUS: Chronic | ICD-10-CM

## 2021-01-01 DIAGNOSIS — J69.0 PNEUMONITIS DUE TO INHALATION OF FOOD AND VOMIT: ICD-10-CM

## 2021-01-01 DIAGNOSIS — R53.2 FUNCTIONAL QUADRIPLEGIA: ICD-10-CM

## 2021-01-01 LAB
ALBUMIN SERPL ELPH-MCNC: 2.1 G/DL — LOW (ref 3.3–5)
ALBUMIN SERPL ELPH-MCNC: 2.7 G/DL — LOW (ref 3.3–5)
ALBUMIN SERPL ELPH-MCNC: 2.8 G/DL — LOW (ref 3.3–5)
ALBUMIN SERPL ELPH-MCNC: 2.8 G/DL — LOW (ref 3.3–5)
ALBUMIN SERPL ELPH-MCNC: 2.9 G/DL — LOW (ref 3.3–5)
ALBUMIN SERPL ELPH-MCNC: 2.9 G/DL — LOW (ref 3.3–5)
ALBUMIN SERPL ELPH-MCNC: 3.1 G/DL — LOW (ref 3.3–5)
ALP SERPL-CCNC: 100 U/L — SIGNIFICANT CHANGE UP (ref 40–120)
ALP SERPL-CCNC: 102 U/L — SIGNIFICANT CHANGE UP (ref 40–120)
ALP SERPL-CCNC: 103 U/L — SIGNIFICANT CHANGE UP (ref 40–120)
ALP SERPL-CCNC: 106 U/L — SIGNIFICANT CHANGE UP (ref 40–120)
ALP SERPL-CCNC: 111 U/L — SIGNIFICANT CHANGE UP (ref 40–120)
ALP SERPL-CCNC: 111 U/L — SIGNIFICANT CHANGE UP (ref 40–120)
ALP SERPL-CCNC: 116 U/L — SIGNIFICANT CHANGE UP (ref 40–120)
ALT FLD-CCNC: 14 U/L — SIGNIFICANT CHANGE UP (ref 4–41)
ALT FLD-CCNC: 22 U/L — SIGNIFICANT CHANGE UP (ref 4–41)
ALT FLD-CCNC: 23 U/L — SIGNIFICANT CHANGE UP (ref 4–41)
ALT FLD-CCNC: 25 U/L — SIGNIFICANT CHANGE UP (ref 4–41)
ALT FLD-CCNC: 25 U/L — SIGNIFICANT CHANGE UP (ref 4–41)
ALT FLD-CCNC: 41 U/L — SIGNIFICANT CHANGE UP (ref 4–41)
ALT FLD-CCNC: 46 U/L — HIGH (ref 4–41)
ANION GAP SERPL CALC-SCNC: 10 MMOL/L — SIGNIFICANT CHANGE UP (ref 7–14)
ANION GAP SERPL CALC-SCNC: 11 MMOL/L — SIGNIFICANT CHANGE UP (ref 7–14)
ANION GAP SERPL CALC-SCNC: 11 MMOL/L — SIGNIFICANT CHANGE UP (ref 7–14)
ANION GAP SERPL CALC-SCNC: 12 MMOL/L — SIGNIFICANT CHANGE UP (ref 7–14)
ANION GAP SERPL CALC-SCNC: 12 MMOL/L — SIGNIFICANT CHANGE UP (ref 7–14)
ANION GAP SERPL CALC-SCNC: 13 MMOL/L — SIGNIFICANT CHANGE UP (ref 7–14)
ANION GAP SERPL CALC-SCNC: 14 MMOL/L — SIGNIFICANT CHANGE UP (ref 7–14)
ANION GAP SERPL CALC-SCNC: 8 MMOL/L — SIGNIFICANT CHANGE UP (ref 7–14)
ANION GAP SERPL CALC-SCNC: 9 MMOL/L — SIGNIFICANT CHANGE UP (ref 7–14)
ANISOCYTOSIS BLD QL: SLIGHT — SIGNIFICANT CHANGE UP
APPEARANCE UR: ABNORMAL
APPEARANCE UR: ABNORMAL
APPEARANCE: ABNORMAL
AST SERPL-CCNC: 17 U/L — SIGNIFICANT CHANGE UP (ref 4–40)
AST SERPL-CCNC: 33 U/L — SIGNIFICANT CHANGE UP (ref 4–40)
AST SERPL-CCNC: 35 U/L — SIGNIFICANT CHANGE UP (ref 4–40)
AST SERPL-CCNC: 36 U/L — SIGNIFICANT CHANGE UP (ref 4–40)
AST SERPL-CCNC: 37 U/L — SIGNIFICANT CHANGE UP (ref 4–40)
AST SERPL-CCNC: 39 U/L — SIGNIFICANT CHANGE UP (ref 4–40)
AST SERPL-CCNC: 62 U/L — HIGH (ref 4–40)
BACTERIA # UR AUTO: ABNORMAL
BACTERIA # UR AUTO: NEGATIVE — SIGNIFICANT CHANGE UP
BACTERIA UR CULT: NORMAL
BACTERIA: ABNORMAL
BASOPHILS # BLD AUTO: 0 K/UL — SIGNIFICANT CHANGE UP (ref 0–0.2)
BASOPHILS # BLD AUTO: 0 K/UL — SIGNIFICANT CHANGE UP (ref 0–0.2)
BASOPHILS # BLD AUTO: 0.02 K/UL — SIGNIFICANT CHANGE UP (ref 0–0.2)
BASOPHILS NFR BLD AUTO: 0 % — SIGNIFICANT CHANGE UP (ref 0–2)
BASOPHILS NFR BLD AUTO: 0 % — SIGNIFICANT CHANGE UP (ref 0–2)
BASOPHILS NFR BLD AUTO: 0.2 % — SIGNIFICANT CHANGE UP (ref 0–2)
BILIRUB SERPL-MCNC: 0.4 MG/DL — SIGNIFICANT CHANGE UP (ref 0.2–1.2)
BILIRUB SERPL-MCNC: 0.5 MG/DL — SIGNIFICANT CHANGE UP (ref 0.2–1.2)
BILIRUB SERPL-MCNC: 0.6 MG/DL — SIGNIFICANT CHANGE UP (ref 0.2–1.2)
BILIRUB UR-MCNC: NEGATIVE — SIGNIFICANT CHANGE UP
BILIRUB UR-MCNC: NEGATIVE — SIGNIFICANT CHANGE UP
BILIRUBIN URINE: NEGATIVE
BLOOD GAS VENOUS COMPREHENSIVE RESULT: SIGNIFICANT CHANGE UP
BLOOD URINE: NORMAL
BUN SERPL-MCNC: 10 MG/DL — SIGNIFICANT CHANGE UP (ref 7–23)
BUN SERPL-MCNC: 10 MG/DL — SIGNIFICANT CHANGE UP (ref 7–23)
BUN SERPL-MCNC: 25 MG/DL — HIGH (ref 7–23)
BUN SERPL-MCNC: 39 MG/DL — HIGH (ref 7–23)
BUN SERPL-MCNC: 43 MG/DL — HIGH (ref 7–23)
BUN SERPL-MCNC: 48 MG/DL — HIGH (ref 7–23)
BUN SERPL-MCNC: 49 MG/DL — HIGH (ref 7–23)
BUN SERPL-MCNC: 53 MG/DL — HIGH (ref 7–23)
BUN SERPL-MCNC: 53 MG/DL — HIGH (ref 7–23)
BUN SERPL-MCNC: 58 MG/DL — HIGH (ref 7–23)
BUN SERPL-MCNC: 60 MG/DL — HIGH (ref 7–23)
BUN SERPL-MCNC: 64 MG/DL — HIGH (ref 7–23)
BUN SERPL-MCNC: 66 MG/DL — HIGH (ref 7–23)
BUN SERPL-MCNC: 67 MG/DL — HIGH (ref 7–23)
BUN SERPL-MCNC: 68 MG/DL — HIGH (ref 7–23)
CALCIUM OXALATE CRYSTALS: ABNORMAL
CALCIUM SERPL-MCNC: 8.3 MG/DL — LOW (ref 8.4–10.5)
CALCIUM SERPL-MCNC: 8.4 MG/DL — SIGNIFICANT CHANGE UP (ref 8.4–10.5)
CALCIUM SERPL-MCNC: 8.4 MG/DL — SIGNIFICANT CHANGE UP (ref 8.4–10.5)
CALCIUM SERPL-MCNC: 8.5 MG/DL — SIGNIFICANT CHANGE UP (ref 8.4–10.5)
CALCIUM SERPL-MCNC: 8.8 MG/DL — SIGNIFICANT CHANGE UP (ref 8.4–10.5)
CALCIUM SERPL-MCNC: 8.9 MG/DL — SIGNIFICANT CHANGE UP (ref 8.4–10.5)
CALCIUM SERPL-MCNC: 8.9 MG/DL — SIGNIFICANT CHANGE UP (ref 8.4–10.5)
CALCIUM SERPL-MCNC: 9 MG/DL — SIGNIFICANT CHANGE UP (ref 8.4–10.5)
CALCIUM SERPL-MCNC: 9.1 MG/DL — SIGNIFICANT CHANGE UP (ref 8.4–10.5)
CALCIUM SERPL-MCNC: 9.1 MG/DL — SIGNIFICANT CHANGE UP (ref 8.4–10.5)
CALCIUM SERPL-MCNC: 9.2 MG/DL — SIGNIFICANT CHANGE UP (ref 8.4–10.5)
CALCIUM SERPL-MCNC: 9.2 MG/DL — SIGNIFICANT CHANGE UP (ref 8.4–10.5)
CALCIUM SERPL-MCNC: 9.4 MG/DL — SIGNIFICANT CHANGE UP (ref 8.4–10.5)
CALCIUM SERPL-MCNC: 9.5 MG/DL — SIGNIFICANT CHANGE UP (ref 8.4–10.5)
CALCIUM SERPL-MCNC: 9.6 MG/DL — SIGNIFICANT CHANGE UP (ref 8.4–10.5)
CHLORIDE SERPL-SCNC: 109 MMOL/L — HIGH (ref 98–107)
CHLORIDE SERPL-SCNC: 110 MMOL/L — HIGH (ref 98–107)
CHLORIDE SERPL-SCNC: 111 MMOL/L — HIGH (ref 98–107)
CHLORIDE SERPL-SCNC: 112 MMOL/L — HIGH (ref 98–107)
CHLORIDE SERPL-SCNC: 114 MMOL/L — HIGH (ref 98–107)
CHLORIDE SERPL-SCNC: 114 MMOL/L — HIGH (ref 98–107)
CHLORIDE SERPL-SCNC: 115 MMOL/L — HIGH (ref 98–107)
CHLORIDE SERPL-SCNC: 119 MMOL/L — HIGH (ref 98–107)
CHLORIDE SERPL-SCNC: 120 MMOL/L — HIGH (ref 98–107)
CHLORIDE SERPL-SCNC: 122 MMOL/L — HIGH (ref 98–107)
CHLORIDE SERPL-SCNC: 97 MMOL/L — LOW (ref 98–107)
CHLORIDE SERPL-SCNC: 97 MMOL/L — LOW (ref 98–107)
CO2 SERPL-SCNC: 23 MMOL/L — SIGNIFICANT CHANGE UP (ref 22–31)
CO2 SERPL-SCNC: 24 MMOL/L — SIGNIFICANT CHANGE UP (ref 22–31)
CO2 SERPL-SCNC: 24 MMOL/L — SIGNIFICANT CHANGE UP (ref 22–31)
CO2 SERPL-SCNC: 25 MMOL/L — SIGNIFICANT CHANGE UP (ref 22–31)
CO2 SERPL-SCNC: 25 MMOL/L — SIGNIFICANT CHANGE UP (ref 22–31)
CO2 SERPL-SCNC: 26 MMOL/L — SIGNIFICANT CHANGE UP (ref 22–31)
CO2 SERPL-SCNC: 27 MMOL/L — SIGNIFICANT CHANGE UP (ref 22–31)
CO2 SERPL-SCNC: 28 MMOL/L — SIGNIFICANT CHANGE UP (ref 22–31)
CO2 SERPL-SCNC: 29 MMOL/L — SIGNIFICANT CHANGE UP (ref 22–31)
CO2 SERPL-SCNC: 29 MMOL/L — SIGNIFICANT CHANGE UP (ref 22–31)
CO2 SERPL-SCNC: 30 MMOL/L — SIGNIFICANT CHANGE UP (ref 22–31)
COLOR SPEC: YELLOW — SIGNIFICANT CHANGE UP
COLOR SPEC: YELLOW — SIGNIFICANT CHANGE UP
COLOR: YELLOW
COMMENT - URINE: SIGNIFICANT CHANGE UP
CREAT SERPL-MCNC: 0.64 MG/DL — SIGNIFICANT CHANGE UP (ref 0.5–1.3)
CREAT SERPL-MCNC: 0.65 MG/DL — SIGNIFICANT CHANGE UP (ref 0.5–1.3)
CREAT SERPL-MCNC: 0.9 MG/DL — SIGNIFICANT CHANGE UP (ref 0.5–1.3)
CREAT SERPL-MCNC: 0.95 MG/DL — SIGNIFICANT CHANGE UP (ref 0.5–1.3)
CREAT SERPL-MCNC: 0.98 MG/DL — SIGNIFICANT CHANGE UP (ref 0.5–1.3)
CREAT SERPL-MCNC: 1.02 MG/DL — SIGNIFICANT CHANGE UP (ref 0.5–1.3)
CREAT SERPL-MCNC: 1.06 MG/DL — SIGNIFICANT CHANGE UP (ref 0.5–1.3)
CREAT SERPL-MCNC: 1.11 MG/DL — SIGNIFICANT CHANGE UP (ref 0.5–1.3)
CREAT SERPL-MCNC: 1.14 MG/DL — SIGNIFICANT CHANGE UP (ref 0.5–1.3)
CREAT SERPL-MCNC: 1.17 MG/DL — SIGNIFICANT CHANGE UP (ref 0.5–1.3)
CREAT SERPL-MCNC: 1.17 MG/DL — SIGNIFICANT CHANGE UP (ref 0.5–1.3)
CREAT SERPL-MCNC: 1.2 MG/DL — SIGNIFICANT CHANGE UP (ref 0.5–1.3)
CREAT SERPL-MCNC: 1.31 MG/DL — HIGH (ref 0.5–1.3)
CREAT SERPL-MCNC: 1.35 MG/DL — HIGH (ref 0.5–1.3)
CREAT SERPL-MCNC: 1.64 MG/DL — HIGH (ref 0.5–1.3)
CRP SERPL-MCNC: 100.2 MG/L — HIGH
CRP SERPL-MCNC: 120.8 MG/L — HIGH
CRP SERPL-MCNC: 29.4 MG/L — HIGH
CRP SERPL-MCNC: 65.7 MG/L — HIGH
CULTURE RESULTS: SIGNIFICANT CHANGE UP
D DIMER BLD IA.RAPID-MCNC: 305 NG/ML DDU — HIGH
D DIMER BLD IA.RAPID-MCNC: 547 NG/ML DDU — HIGH
D DIMER BLD IA.RAPID-MCNC: 633 NG/ML DDU — HIGH
DIFF PNL FLD: NEGATIVE — SIGNIFICANT CHANGE UP
DIFF PNL FLD: NEGATIVE — SIGNIFICANT CHANGE UP
EOSINOPHIL # BLD AUTO: 0 K/UL — SIGNIFICANT CHANGE UP (ref 0–0.5)
EOSINOPHIL NFR BLD AUTO: 0 % — SIGNIFICANT CHANGE UP (ref 0–6)
EPI CELLS # UR: 1 /HPF — SIGNIFICANT CHANGE UP (ref 0–5)
EPI CELLS # UR: SIGNIFICANT CHANGE UP
ERYTHROCYTE [SEDIMENTATION RATE] IN BLOOD: 81 MM/HR — HIGH (ref 1–15)
FERRITIN SERPL-MCNC: 1136 NG/ML — HIGH (ref 30–400)
FERRITIN SERPL-MCNC: 553 NG/ML — HIGH (ref 30–400)
FERRITIN SERPL-MCNC: 606 NG/ML — HIGH (ref 30–400)
FERRITIN SERPL-MCNC: 820 NG/ML — HIGH (ref 30–400)
FIBRINOGEN PPP-MCNC: 789 MG/DL — HIGH (ref 290–520)
GLUCOSE BLDC GLUCOMTR-MCNC: 100 MG/DL — HIGH (ref 70–99)
GLUCOSE BLDC GLUCOMTR-MCNC: 105 MG/DL — HIGH (ref 70–99)
GLUCOSE BLDC GLUCOMTR-MCNC: 111 MG/DL — HIGH (ref 70–99)
GLUCOSE BLDC GLUCOMTR-MCNC: 115 MG/DL — HIGH (ref 70–99)
GLUCOSE BLDC GLUCOMTR-MCNC: 124 MG/DL — HIGH (ref 70–99)
GLUCOSE BLDC GLUCOMTR-MCNC: 133 MG/DL — HIGH (ref 70–99)
GLUCOSE BLDC GLUCOMTR-MCNC: 140 MG/DL — HIGH (ref 70–99)
GLUCOSE BLDC GLUCOMTR-MCNC: 163 MG/DL — HIGH (ref 70–99)
GLUCOSE BLDC GLUCOMTR-MCNC: 165 MG/DL — HIGH (ref 70–99)
GLUCOSE BLDC GLUCOMTR-MCNC: 171 MG/DL — HIGH (ref 70–99)
GLUCOSE BLDC GLUCOMTR-MCNC: 83 MG/DL — SIGNIFICANT CHANGE UP (ref 70–99)
GLUCOSE QUALITATIVE U: NEGATIVE
GLUCOSE SERPL-MCNC: 112 MG/DL — HIGH (ref 70–99)
GLUCOSE SERPL-MCNC: 114 MG/DL — HIGH (ref 70–99)
GLUCOSE SERPL-MCNC: 124 MG/DL — HIGH (ref 70–99)
GLUCOSE SERPL-MCNC: 126 MG/DL — HIGH (ref 70–99)
GLUCOSE SERPL-MCNC: 129 MG/DL — HIGH (ref 70–99)
GLUCOSE SERPL-MCNC: 137 MG/DL — HIGH (ref 70–99)
GLUCOSE SERPL-MCNC: 139 MG/DL — HIGH (ref 70–99)
GLUCOSE SERPL-MCNC: 143 MG/DL — HIGH (ref 70–99)
GLUCOSE SERPL-MCNC: 154 MG/DL — HIGH (ref 70–99)
GLUCOSE SERPL-MCNC: 156 MG/DL — HIGH (ref 70–99)
GLUCOSE SERPL-MCNC: 181 MG/DL — HIGH (ref 70–99)
GLUCOSE SERPL-MCNC: 189 MG/DL — HIGH (ref 70–99)
GLUCOSE SERPL-MCNC: 68 MG/DL — LOW (ref 70–99)
GLUCOSE SERPL-MCNC: 81 MG/DL — SIGNIFICANT CHANGE UP (ref 70–99)
GLUCOSE SERPL-MCNC: 98 MG/DL — SIGNIFICANT CHANGE UP (ref 70–99)
GLUCOSE UR QL: NEGATIVE — SIGNIFICANT CHANGE UP
GLUCOSE UR QL: NEGATIVE — SIGNIFICANT CHANGE UP
HCT VFR BLD CALC: 22.9 % — LOW (ref 39–50)
HCT VFR BLD CALC: 37.8 % — LOW (ref 39–50)
HCT VFR BLD CALC: 39 % — SIGNIFICANT CHANGE UP (ref 39–50)
HCT VFR BLD CALC: 39.2 % — SIGNIFICANT CHANGE UP (ref 39–50)
HCT VFR BLD CALC: 40.4 % — SIGNIFICANT CHANGE UP (ref 39–50)
HCT VFR BLD CALC: 43.3 % — SIGNIFICANT CHANGE UP (ref 39–50)
HCT VFR BLD CALC: 43.5 % — SIGNIFICANT CHANGE UP (ref 39–50)
HCT VFR BLD CALC: 43.8 % — SIGNIFICANT CHANGE UP (ref 39–50)
HCT VFR BLD CALC: 46.4 % — SIGNIFICANT CHANGE UP (ref 39–50)
HCT VFR BLD CALC: 47 % — SIGNIFICANT CHANGE UP (ref 39–50)
HCT VFR BLD CALC: 47.8 % — SIGNIFICANT CHANGE UP (ref 39–50)
HCT VFR BLD CALC: 51.4 % — HIGH (ref 39–50)
HCT VFR BLD CALC: 52.3 % — HIGH (ref 39–50)
HCT VFR BLD CALC: 52.7 % — HIGH (ref 39–50)
HGB BLD-MCNC: 11.7 G/DL — LOW (ref 13–17)
HGB BLD-MCNC: 11.9 G/DL — LOW (ref 13–17)
HGB BLD-MCNC: 12.4 G/DL — LOW (ref 13–17)
HGB BLD-MCNC: 12.4 G/DL — LOW (ref 13–17)
HGB BLD-MCNC: 13 G/DL — SIGNIFICANT CHANGE UP (ref 13–17)
HGB BLD-MCNC: 13.1 G/DL — SIGNIFICANT CHANGE UP (ref 13–17)
HGB BLD-MCNC: 13.4 G/DL — SIGNIFICANT CHANGE UP (ref 13–17)
HGB BLD-MCNC: 14.2 G/DL — SIGNIFICANT CHANGE UP (ref 13–17)
HGB BLD-MCNC: 14.2 G/DL — SIGNIFICANT CHANGE UP (ref 13–17)
HGB BLD-MCNC: 14.3 G/DL — SIGNIFICANT CHANGE UP (ref 13–17)
HGB BLD-MCNC: 15.2 G/DL — SIGNIFICANT CHANGE UP (ref 13–17)
HGB BLD-MCNC: 15.3 G/DL — SIGNIFICANT CHANGE UP (ref 13–17)
HGB BLD-MCNC: 15.8 G/DL — SIGNIFICANT CHANGE UP (ref 13–17)
HGB BLD-MCNC: 5.4 G/DL — CRITICAL LOW (ref 13–17)
HYALINE CASTS # UR AUTO: 1 /LPF — SIGNIFICANT CHANGE UP (ref 0–7)
HYALINE CASTS: 5 /LPF
IANC: 10.21 K/UL — HIGH (ref 1.5–8.5)
IANC: 30.48 K/UL — HIGH (ref 1.5–8.5)
IANC: 7.88 K/UL — SIGNIFICANT CHANGE UP (ref 1.5–8.5)
IMM GRANULOCYTES NFR BLD AUTO: 4.2 % — HIGH (ref 0–1.5)
INR BLD: 1.28 RATIO — HIGH (ref 0.88–1.16)
KETONES UR-MCNC: NEGATIVE — SIGNIFICANT CHANGE UP
KETONES UR-MCNC: NEGATIVE — SIGNIFICANT CHANGE UP
KETONES URINE: NEGATIVE
LDH SERPL L TO P-CCNC: 249 U/L — HIGH (ref 135–225)
LDH SERPL L TO P-CCNC: 312 U/L — HIGH (ref 135–225)
LDH SERPL L TO P-CCNC: 377 U/L — HIGH (ref 135–225)
LEUKOCYTE ESTERASE UR-ACNC: ABNORMAL
LEUKOCYTE ESTERASE UR-ACNC: NEGATIVE — SIGNIFICANT CHANGE UP
LEUKOCYTE ESTERASE URINE: ABNORMAL
LYMPHOCYTES # BLD AUTO: 0 % — LOW (ref 13–44)
LYMPHOCYTES # BLD AUTO: 0 K/UL — LOW (ref 1–3.3)
LYMPHOCYTES # BLD AUTO: 0.22 K/UL — LOW (ref 1–3.3)
LYMPHOCYTES # BLD AUTO: 0.59 K/UL — LOW (ref 1–3.3)
LYMPHOCYTES # BLD AUTO: 1.8 % — LOW (ref 13–44)
LYMPHOCYTES # BLD AUTO: 6.4 % — LOW (ref 13–44)
MACROCYTES BLD QL: SIGNIFICANT CHANGE UP
MAGNESIUM SERPL-MCNC: 1.5 MG/DL — LOW (ref 1.6–2.6)
MAGNESIUM SERPL-MCNC: 1.8 MG/DL — SIGNIFICANT CHANGE UP (ref 1.6–2.6)
MAGNESIUM SERPL-MCNC: 1.8 MG/DL — SIGNIFICANT CHANGE UP (ref 1.6–2.6)
MAGNESIUM SERPL-MCNC: 2.1 MG/DL — SIGNIFICANT CHANGE UP (ref 1.6–2.6)
MAGNESIUM SERPL-MCNC: 2.4 MG/DL — SIGNIFICANT CHANGE UP (ref 1.6–2.6)
MAGNESIUM SERPL-MCNC: 2.5 MG/DL — SIGNIFICANT CHANGE UP (ref 1.6–2.6)
MAGNESIUM SERPL-MCNC: 2.6 MG/DL — SIGNIFICANT CHANGE UP (ref 1.6–2.6)
MAGNESIUM SERPL-MCNC: 2.6 MG/DL — SIGNIFICANT CHANGE UP (ref 1.6–2.6)
MAGNESIUM SERPL-MCNC: 2.7 MG/DL — HIGH (ref 1.6–2.6)
MAGNESIUM SERPL-MCNC: 2.8 MG/DL — HIGH (ref 1.6–2.6)
MCHC RBC-ENTMCNC: 23.6 GM/DL — LOW (ref 32–36)
MCHC RBC-ENTMCNC: 29.3 GM/DL — LOW (ref 32–36)
MCHC RBC-ENTMCNC: 29.6 GM/DL — LOW (ref 32–36)
MCHC RBC-ENTMCNC: 29.7 GM/DL — LOW (ref 32–36)
MCHC RBC-ENTMCNC: 29.7 GM/DL — LOW (ref 32–36)
MCHC RBC-ENTMCNC: 30 GM/DL — LOW (ref 32–36)
MCHC RBC-ENTMCNC: 30.2 GM/DL — LOW (ref 32–36)
MCHC RBC-ENTMCNC: 30.3 GM/DL — LOW (ref 32–36)
MCHC RBC-ENTMCNC: 30.4 GM/DL — LOW (ref 32–36)
MCHC RBC-ENTMCNC: 30.7 GM/DL — LOW (ref 32–36)
MCHC RBC-ENTMCNC: 30.8 GM/DL — LOW (ref 32–36)
MCHC RBC-ENTMCNC: 30.8 GM/DL — LOW (ref 32–36)
MCHC RBC-ENTMCNC: 31 GM/DL — LOW (ref 32–36)
MCHC RBC-ENTMCNC: 31.8 GM/DL — LOW (ref 32–36)
MCHC RBC-ENTMCNC: 32.5 PG — SIGNIFICANT CHANGE UP (ref 27–34)
MCHC RBC-ENTMCNC: 32.8 PG — SIGNIFICANT CHANGE UP (ref 27–34)
MCHC RBC-ENTMCNC: 32.9 PG — SIGNIFICANT CHANGE UP (ref 27–34)
MCHC RBC-ENTMCNC: 33 PG — SIGNIFICANT CHANGE UP (ref 27–34)
MCHC RBC-ENTMCNC: 33.1 PG — SIGNIFICANT CHANGE UP (ref 27–34)
MCHC RBC-ENTMCNC: 33.1 PG — SIGNIFICANT CHANGE UP (ref 27–34)
MCHC RBC-ENTMCNC: 33.2 PG — SIGNIFICANT CHANGE UP (ref 27–34)
MCHC RBC-ENTMCNC: 33.4 PG — SIGNIFICANT CHANGE UP (ref 27–34)
MCHC RBC-ENTMCNC: 37.2 PG — HIGH (ref 27–34)
MCV RBC AUTO: 104.3 FL — HIGH (ref 80–100)
MCV RBC AUTO: 105.9 FL — HIGH (ref 80–100)
MCV RBC AUTO: 106.4 FL — HIGH (ref 80–100)
MCV RBC AUTO: 108 FL — HIGH (ref 80–100)
MCV RBC AUTO: 108.4 FL — HIGH (ref 80–100)
MCV RBC AUTO: 108.4 FL — HIGH (ref 80–100)
MCV RBC AUTO: 108.5 FL — HIGH (ref 80–100)
MCV RBC AUTO: 109.1 FL — HIGH (ref 80–100)
MCV RBC AUTO: 109.2 FL — HIGH (ref 80–100)
MCV RBC AUTO: 110.9 FL — HIGH (ref 80–100)
MCV RBC AUTO: 111.7 FL — HIGH (ref 80–100)
MCV RBC AUTO: 112 FL — HIGH (ref 80–100)
MCV RBC AUTO: 112 FL — HIGH (ref 80–100)
MCV RBC AUTO: 157.9 FL — HIGH (ref 80–100)
MICROSCOPIC-UA: NORMAL
MONOCYTES # BLD AUTO: 0.34 K/UL — SIGNIFICANT CHANGE UP (ref 0–0.9)
MONOCYTES # BLD AUTO: 0.56 K/UL — SIGNIFICANT CHANGE UP (ref 0–0.9)
MONOCYTES # BLD AUTO: 1.14 K/UL — HIGH (ref 0–0.9)
MONOCYTES NFR BLD AUTO: 3.4 % — SIGNIFICANT CHANGE UP (ref 2–14)
MONOCYTES NFR BLD AUTO: 3.7 % — SIGNIFICANT CHANGE UP (ref 2–14)
MONOCYTES NFR BLD AUTO: 4.5 % — SIGNIFICANT CHANGE UP (ref 2–14)
NEUTROPHILS # BLD AUTO: 10.97 K/UL — HIGH (ref 1.8–7.4)
NEUTROPHILS # BLD AUTO: 31.51 K/UL — HIGH (ref 1.8–7.4)
NEUTROPHILS # BLD AUTO: 7.88 K/UL — HIGH (ref 1.8–7.4)
NEUTROPHILS NFR BLD AUTO: 80.3 % — HIGH (ref 43–77)
NEUTROPHILS NFR BLD AUTO: 85.5 % — HIGH (ref 43–77)
NEUTROPHILS NFR BLD AUTO: 90.6 % — HIGH (ref 43–77)
NEUTS BAND # BLD: 3.4 % — SIGNIFICANT CHANGE UP (ref 0–6)
NITRITE UR-MCNC: NEGATIVE — SIGNIFICANT CHANGE UP
NITRITE UR-MCNC: NEGATIVE — SIGNIFICANT CHANGE UP
NITRITE URINE: NEGATIVE
NRBC # BLD: 0 /100 WBCS — SIGNIFICANT CHANGE UP
NRBC # FLD: 0 K/UL — SIGNIFICANT CHANGE UP
PH UR: 5.5 — SIGNIFICANT CHANGE UP (ref 5–8)
PH UR: 5.5 — SIGNIFICANT CHANGE UP (ref 5–8)
PH URINE: 6.5
PHOSPHATE SERPL-MCNC: 1.9 MG/DL — LOW (ref 2.5–4.5)
PHOSPHATE SERPL-MCNC: 2 MG/DL — LOW (ref 2.5–4.5)
PHOSPHATE SERPL-MCNC: 2 MG/DL — LOW (ref 2.5–4.5)
PHOSPHATE SERPL-MCNC: 2.3 MG/DL — LOW (ref 2.5–4.5)
PHOSPHATE SERPL-MCNC: 2.3 MG/DL — LOW (ref 2.5–4.5)
PHOSPHATE SERPL-MCNC: 3 MG/DL — SIGNIFICANT CHANGE UP (ref 2.5–4.5)
PHOSPHATE SERPL-MCNC: 3.3 MG/DL — SIGNIFICANT CHANGE UP (ref 2.5–4.5)
PHOSPHATE SERPL-MCNC: 4.3 MG/DL — SIGNIFICANT CHANGE UP (ref 2.5–4.5)
PHOSPHATE SERPL-MCNC: 4.6 MG/DL — HIGH (ref 2.5–4.5)
PLAT MORPH BLD: NORMAL — SIGNIFICANT CHANGE UP
PLATELET # BLD AUTO: 161 K/UL — SIGNIFICANT CHANGE UP (ref 150–400)
PLATELET # BLD AUTO: 168 K/UL — SIGNIFICANT CHANGE UP (ref 150–400)
PLATELET # BLD AUTO: 181 K/UL — SIGNIFICANT CHANGE UP (ref 150–400)
PLATELET # BLD AUTO: 204 K/UL — SIGNIFICANT CHANGE UP (ref 150–400)
PLATELET # BLD AUTO: 211 K/UL — SIGNIFICANT CHANGE UP (ref 150–400)
PLATELET # BLD AUTO: 271 K/UL — SIGNIFICANT CHANGE UP (ref 150–400)
PLATELET # BLD AUTO: 287 K/UL — SIGNIFICANT CHANGE UP (ref 150–400)
PLATELET # BLD AUTO: 298 K/UL — SIGNIFICANT CHANGE UP (ref 150–400)
PLATELET # BLD AUTO: 311 K/UL — SIGNIFICANT CHANGE UP (ref 150–400)
PLATELET # BLD AUTO: 321 K/UL — SIGNIFICANT CHANGE UP (ref 150–400)
PLATELET # BLD AUTO: 323 K/UL — SIGNIFICANT CHANGE UP (ref 150–400)
PLATELET # BLD AUTO: 337 K/UL — SIGNIFICANT CHANGE UP (ref 150–400)
PLATELET # BLD AUTO: 399 K/UL — SIGNIFICANT CHANGE UP (ref 150–400)
PLATELET # BLD AUTO: 71 K/UL — LOW (ref 150–400)
PLATELET COUNT - ESTIMATE: NORMAL — SIGNIFICANT CHANGE UP
POLYCHROMASIA BLD QL SMEAR: SLIGHT — SIGNIFICANT CHANGE UP
POTASSIUM SERPL-MCNC: 3.6 MMOL/L — SIGNIFICANT CHANGE UP (ref 3.5–5.3)
POTASSIUM SERPL-MCNC: 3.9 MMOL/L — SIGNIFICANT CHANGE UP (ref 3.5–5.3)
POTASSIUM SERPL-MCNC: 4.1 MMOL/L — SIGNIFICANT CHANGE UP (ref 3.5–5.3)
POTASSIUM SERPL-MCNC: 4.2 MMOL/L — SIGNIFICANT CHANGE UP (ref 3.5–5.3)
POTASSIUM SERPL-MCNC: 4.2 MMOL/L — SIGNIFICANT CHANGE UP (ref 3.5–5.3)
POTASSIUM SERPL-MCNC: 4.3 MMOL/L — SIGNIFICANT CHANGE UP (ref 3.5–5.3)
POTASSIUM SERPL-MCNC: 4.3 MMOL/L — SIGNIFICANT CHANGE UP (ref 3.5–5.3)
POTASSIUM SERPL-MCNC: 4.4 MMOL/L — SIGNIFICANT CHANGE UP (ref 3.5–5.3)
POTASSIUM SERPL-MCNC: 4.7 MMOL/L — SIGNIFICANT CHANGE UP (ref 3.5–5.3)
POTASSIUM SERPL-MCNC: 5 MMOL/L — SIGNIFICANT CHANGE UP (ref 3.5–5.3)
POTASSIUM SERPL-MCNC: 5.1 MMOL/L — SIGNIFICANT CHANGE UP (ref 3.5–5.3)
POTASSIUM SERPL-MCNC: 5.3 MMOL/L — SIGNIFICANT CHANGE UP (ref 3.5–5.3)
POTASSIUM SERPL-MCNC: 5.7 MMOL/L — HIGH (ref 3.5–5.3)
POTASSIUM SERPL-SCNC: 3.6 MMOL/L — SIGNIFICANT CHANGE UP (ref 3.5–5.3)
POTASSIUM SERPL-SCNC: 3.9 MMOL/L — SIGNIFICANT CHANGE UP (ref 3.5–5.3)
POTASSIUM SERPL-SCNC: 4.1 MMOL/L — SIGNIFICANT CHANGE UP (ref 3.5–5.3)
POTASSIUM SERPL-SCNC: 4.2 MMOL/L — SIGNIFICANT CHANGE UP (ref 3.5–5.3)
POTASSIUM SERPL-SCNC: 4.2 MMOL/L — SIGNIFICANT CHANGE UP (ref 3.5–5.3)
POTASSIUM SERPL-SCNC: 4.3 MMOL/L — SIGNIFICANT CHANGE UP (ref 3.5–5.3)
POTASSIUM SERPL-SCNC: 4.3 MMOL/L — SIGNIFICANT CHANGE UP (ref 3.5–5.3)
POTASSIUM SERPL-SCNC: 4.4 MMOL/L — SIGNIFICANT CHANGE UP (ref 3.5–5.3)
POTASSIUM SERPL-SCNC: 4.7 MMOL/L — SIGNIFICANT CHANGE UP (ref 3.5–5.3)
POTASSIUM SERPL-SCNC: 5 MMOL/L — SIGNIFICANT CHANGE UP (ref 3.5–5.3)
POTASSIUM SERPL-SCNC: 5.1 MMOL/L — SIGNIFICANT CHANGE UP (ref 3.5–5.3)
POTASSIUM SERPL-SCNC: 5.3 MMOL/L — SIGNIFICANT CHANGE UP (ref 3.5–5.3)
POTASSIUM SERPL-SCNC: 5.7 MMOL/L — HIGH (ref 3.5–5.3)
PROCALCITONIN SERPL-MCNC: 0.12 NG/ML — HIGH (ref 0.02–0.1)
PROCALCITONIN SERPL-MCNC: 0.14 NG/ML — HIGH (ref 0.02–0.1)
PROT SERPL-MCNC: 6.4 G/DL — SIGNIFICANT CHANGE UP (ref 6–8.3)
PROT SERPL-MCNC: 7 G/DL — SIGNIFICANT CHANGE UP (ref 6–8.3)
PROT SERPL-MCNC: 7.2 G/DL — SIGNIFICANT CHANGE UP (ref 6–8.3)
PROT SERPL-MCNC: 7.4 G/DL — SIGNIFICANT CHANGE UP (ref 6–8.3)
PROT SERPL-MCNC: 7.4 G/DL — SIGNIFICANT CHANGE UP (ref 6–8.3)
PROT SERPL-MCNC: 7.5 G/DL — SIGNIFICANT CHANGE UP (ref 6–8.3)
PROT SERPL-MCNC: 7.8 G/DL — SIGNIFICANT CHANGE UP (ref 6–8.3)
PROT UR-MCNC: ABNORMAL
PROT UR-MCNC: ABNORMAL
PROTEIN URINE: ABNORMAL
PROTHROM AB SERPL-ACNC: 14.5 SEC — HIGH (ref 10.6–13.6)
RBC # BLD: 1.45 M/UL — LOW (ref 4.2–5.8)
RBC # BLD: 3.57 M/UL — LOW (ref 4.2–5.8)
RBC # BLD: 3.59 M/UL — LOW (ref 4.2–5.8)
RBC # BLD: 3.74 M/UL — LOW (ref 4.2–5.8)
RBC # BLD: 3.74 M/UL — LOW (ref 4.2–5.8)
RBC # BLD: 3.92 M/UL — LOW (ref 4.2–5.8)
RBC # BLD: 3.97 M/UL — LOW (ref 4.2–5.8)
RBC # BLD: 4.09 M/UL — LOW (ref 4.2–5.8)
RBC # BLD: 4.28 M/UL — SIGNIFICANT CHANGE UP (ref 4.2–5.8)
RBC # BLD: 4.31 M/UL — SIGNIFICANT CHANGE UP (ref 4.2–5.8)
RBC # BLD: 4.33 M/UL — SIGNIFICANT CHANGE UP (ref 4.2–5.8)
RBC # BLD: 4.59 M/UL — SIGNIFICANT CHANGE UP (ref 4.2–5.8)
RBC # BLD: 4.67 M/UL — SIGNIFICANT CHANGE UP (ref 4.2–5.8)
RBC # BLD: 4.86 M/UL — SIGNIFICANT CHANGE UP (ref 4.2–5.8)
RBC # FLD: 14.9 % — HIGH (ref 10.3–14.5)
RBC # FLD: 15 % — HIGH (ref 10.3–14.5)
RBC # FLD: 15.2 % — HIGH (ref 10.3–14.5)
RBC # FLD: 15.2 % — HIGH (ref 10.3–14.5)
RBC # FLD: 15.3 % — HIGH (ref 10.3–14.5)
RBC # FLD: 15.4 % — HIGH (ref 10.3–14.5)
RBC # FLD: 15.4 % — HIGH (ref 10.3–14.5)
RBC # FLD: 15.5 % — HIGH (ref 10.3–14.5)
RBC # FLD: 15.5 % — HIGH (ref 10.3–14.5)
RBC # FLD: 15.6 % — HIGH (ref 10.3–14.5)
RBC # FLD: SIGNIFICANT CHANGE UP (ref 10.3–14.5)
RBC BLD AUTO: ABNORMAL
RBC CASTS # UR COMP ASSIST: 9 /HPF — HIGH (ref 0–4)
RBC CASTS # UR COMP ASSIST: SIGNIFICANT CHANGE UP /HPF (ref 0–4)
RED BLOOD CELLS URINE: 15 /HPF
SARS-COV-2 IGG SERPL QL IA: POSITIVE
SARS-COV-2 IGM SERPL IA-ACNC: 6.13 INDEX — HIGH
SARS-COV-2 RNA SPEC QL NAA+PROBE: DETECTED
SARS-COV-2 RNA SPEC QL NAA+PROBE: DETECTED
SODIUM SERPL-SCNC: 136 MMOL/L — SIGNIFICANT CHANGE UP (ref 135–145)
SODIUM SERPL-SCNC: 136 MMOL/L — SIGNIFICANT CHANGE UP (ref 135–145)
SODIUM SERPL-SCNC: 145 MMOL/L — SIGNIFICANT CHANGE UP (ref 135–145)
SODIUM SERPL-SCNC: 146 MMOL/L — HIGH (ref 135–145)
SODIUM SERPL-SCNC: 147 MMOL/L — HIGH (ref 135–145)
SODIUM SERPL-SCNC: 148 MMOL/L — HIGH (ref 135–145)
SODIUM SERPL-SCNC: 149 MMOL/L — HIGH (ref 135–145)
SODIUM SERPL-SCNC: 152 MMOL/L — HIGH (ref 135–145)
SODIUM SERPL-SCNC: 152 MMOL/L — HIGH (ref 135–145)
SODIUM SERPL-SCNC: 155 MMOL/L — HIGH (ref 135–145)
SODIUM SERPL-SCNC: 155 MMOL/L — HIGH (ref 135–145)
SODIUM SERPL-SCNC: 157 MMOL/L — HIGH (ref 135–145)
SODIUM SERPL-SCNC: 158 MMOL/L — HIGH (ref 135–145)
SP GR SPEC: 1.02 — SIGNIFICANT CHANGE UP (ref 1.01–1.02)
SP GR SPEC: 1.03 — HIGH (ref 1.01–1.02)
SPECIFIC GRAVITY URINE: 1.02
SPECIMEN SOURCE: SIGNIFICANT CHANGE UP
SQUAMOUS EPITHELIAL CELLS: 3 /HPF
URATE CRY FLD QL MICRO: ABNORMAL
UROBILINOGEN FLD QL: ABNORMAL
UROBILINOGEN FLD QL: SIGNIFICANT CHANGE UP
UROBILINOGEN URINE: ABNORMAL
VARIANT LYMPHS # BLD: 2.6 % — SIGNIFICANT CHANGE UP (ref 0–6)
WBC # BLD: 10.74 K/UL — HIGH (ref 3.8–10.5)
WBC # BLD: 12 K/UL — HIGH (ref 3.8–10.5)
WBC # BLD: 12.42 K/UL — HIGH (ref 3.8–10.5)
WBC # BLD: 15.08 K/UL — HIGH (ref 3.8–10.5)
WBC # BLD: 15.14 K/UL — HIGH (ref 3.8–10.5)
WBC # BLD: 15.89 K/UL — HIGH (ref 3.8–10.5)
WBC # BLD: 22.63 K/UL — HIGH (ref 3.8–10.5)
WBC # BLD: 25.89 K/UL — HIGH (ref 3.8–10.5)
WBC # BLD: 29.72 K/UL — HIGH (ref 3.8–10.5)
WBC # BLD: 32.25 K/UL — HIGH (ref 3.8–10.5)
WBC # BLD: 33.36 K/UL — HIGH (ref 3.8–10.5)
WBC # BLD: 33.52 K/UL — HIGH (ref 3.8–10.5)
WBC # BLD: 37.98 K/UL — HIGH (ref 3.8–10.5)
WBC # BLD: 9.22 K/UL — SIGNIFICANT CHANGE UP (ref 3.8–10.5)
WBC # FLD AUTO: 10.74 K/UL — HIGH (ref 3.8–10.5)
WBC # FLD AUTO: 12 K/UL — HIGH (ref 3.8–10.5)
WBC # FLD AUTO: 12.42 K/UL — HIGH (ref 3.8–10.5)
WBC # FLD AUTO: 15.08 K/UL — HIGH (ref 3.8–10.5)
WBC # FLD AUTO: 15.14 K/UL — HIGH (ref 3.8–10.5)
WBC # FLD AUTO: 15.89 K/UL — HIGH (ref 3.8–10.5)
WBC # FLD AUTO: 22.63 K/UL — HIGH (ref 3.8–10.5)
WBC # FLD AUTO: 25.89 K/UL — HIGH (ref 3.8–10.5)
WBC # FLD AUTO: 29.72 K/UL — HIGH (ref 3.8–10.5)
WBC # FLD AUTO: 32.25 K/UL — HIGH (ref 3.8–10.5)
WBC # FLD AUTO: 33.36 K/UL — HIGH (ref 3.8–10.5)
WBC # FLD AUTO: 33.52 K/UL — HIGH (ref 3.8–10.5)
WBC # FLD AUTO: 37.98 K/UL — HIGH (ref 3.8–10.5)
WBC # FLD AUTO: 9.22 K/UL — SIGNIFICANT CHANGE UP (ref 3.8–10.5)
WBC UR QL: 2 /HPF — SIGNIFICANT CHANGE UP (ref 0–5)
WBC UR QL: >50 /HPF — HIGH (ref 0–5)
WHITE BLOOD CELLS URINE: 663 /HPF

## 2021-01-01 PROCEDURE — 99232 SBSQ HOSP IP/OBS MODERATE 35: CPT | Mod: CS

## 2021-01-01 PROCEDURE — 71045 X-RAY EXAM CHEST 1 VIEW: CPT | Mod: 26

## 2021-01-01 PROCEDURE — 99497 ADVNCD CARE PLAN 30 MIN: CPT | Mod: GC,25

## 2021-01-01 PROCEDURE — 99233 SBSQ HOSP IP/OBS HIGH 50: CPT | Mod: CS

## 2021-01-01 PROCEDURE — 99233 SBSQ HOSP IP/OBS HIGH 50: CPT | Mod: GC

## 2021-01-01 PROCEDURE — 99223 1ST HOSP IP/OBS HIGH 75: CPT | Mod: GC

## 2021-01-01 PROCEDURE — 99222 1ST HOSP IP/OBS MODERATE 55: CPT | Mod: CS,GC

## 2021-01-01 PROCEDURE — 99232 SBSQ HOSP IP/OBS MODERATE 35: CPT | Mod: GC

## 2021-01-01 PROCEDURE — 99285 EMERGENCY DEPT VISIT HI MDM: CPT | Mod: CS

## 2021-01-01 PROCEDURE — 12345: CPT | Mod: NC

## 2021-01-01 PROCEDURE — 99239 HOSP IP/OBS DSCHRG MGMT >30: CPT | Mod: CS

## 2021-01-01 PROCEDURE — 99223 1ST HOSP IP/OBS HIGH 75: CPT | Mod: CS

## 2021-01-01 RX ORDER — FINASTERIDE 5 MG/1
5 TABLET, FILM COATED ORAL DAILY
Refills: 0 | Status: DISCONTINUED | OUTPATIENT
Start: 2021-01-01 | End: 2021-01-01

## 2021-01-01 RX ORDER — DEXAMETHASONE 0.5 MG/5ML
6 ELIXIR ORAL DAILY
Refills: 0 | Status: COMPLETED | OUTPATIENT
Start: 2021-01-01 | End: 2021-01-01

## 2021-01-01 RX ORDER — ZINC SULFATE TAB 220 MG (50 MG ZINC EQUIVALENT) 220 (50 ZN) MG
220 TAB ORAL DAILY
Refills: 0 | Status: DISCONTINUED | OUTPATIENT
Start: 2021-01-01 | End: 2021-01-01

## 2021-01-01 RX ORDER — SODIUM POLYSTYRENE SULFONATE 4.1 MEQ/G
15 POWDER, FOR SUSPENSION ORAL ONCE
Refills: 0 | Status: COMPLETED | OUTPATIENT
Start: 2021-01-01 | End: 2021-01-01

## 2021-01-01 RX ORDER — METOPROLOL TARTRATE 50 MG
5 TABLET ORAL EVERY 6 HOURS
Refills: 0 | Status: DISCONTINUED | OUTPATIENT
Start: 2021-01-01 | End: 2021-01-01

## 2021-01-01 RX ORDER — SULFAMETHOXAZOLE AND TRIMETHOPRIM 800; 160 MG/1; MG/1
800-160 TABLET ORAL TWICE DAILY
Qty: 20 | Refills: 0 | Status: ACTIVE | COMMUNITY
Start: 2021-01-01 | End: 1900-01-01

## 2021-01-01 RX ORDER — CEFTRIAXONE 500 MG/1
1000 INJECTION, POWDER, FOR SOLUTION INTRAMUSCULAR; INTRAVENOUS ONCE
Refills: 0 | Status: COMPLETED | OUTPATIENT
Start: 2021-01-01 | End: 2021-01-01

## 2021-01-01 RX ORDER — SODIUM CHLORIDE 9 MG/ML
1000 INJECTION INTRAMUSCULAR; INTRAVENOUS; SUBCUTANEOUS
Refills: 0 | Status: DISCONTINUED | OUTPATIENT
Start: 2021-01-01 | End: 2021-01-01

## 2021-01-01 RX ORDER — DUTASTERIDE 0.5 MG/1
0.5 CAPSULE, LIQUID FILLED ORAL
Qty: 90 | Refills: 1 | Status: ACTIVE | COMMUNITY
Start: 1900-01-01 | End: 1900-01-01

## 2021-01-01 RX ORDER — TAMSULOSIN HYDROCHLORIDE 0.4 MG/1
0.4 CAPSULE ORAL AT BEDTIME
Refills: 0 | Status: DISCONTINUED | OUTPATIENT
Start: 2021-01-01 | End: 2021-01-01

## 2021-01-01 RX ORDER — DEXAMETHASONE 0.5 MG/5ML
6 ELIXIR ORAL ONCE
Refills: 0 | Status: COMPLETED | OUTPATIENT
Start: 2021-01-01 | End: 2021-01-01

## 2021-01-01 RX ORDER — OXCARBAZEPINE 300 MG/1
300 TABLET, FILM COATED ORAL
Refills: 0 | Status: DISCONTINUED | OUTPATIENT
Start: 2021-01-01 | End: 2021-01-01

## 2021-01-01 RX ORDER — PANTOPRAZOLE SODIUM 20 MG/1
1 TABLET, DELAYED RELEASE ORAL
Qty: 0 | Refills: 0 | DISCHARGE

## 2021-01-01 RX ORDER — PANTOPRAZOLE 40 MG/1
40 TABLET, DELAYED RELEASE ORAL DAILY
Qty: 90 | Refills: 1 | Status: ACTIVE | COMMUNITY
Start: 2018-05-26 | End: 1900-01-01

## 2021-01-01 RX ORDER — POTASSIUM PHOSPHATE, MONOBASIC POTASSIUM PHOSPHATE, DIBASIC 236; 224 MG/ML; MG/ML
15 INJECTION, SOLUTION INTRAVENOUS ONCE
Refills: 0 | Status: COMPLETED | OUTPATIENT
Start: 2021-01-01 | End: 2021-01-01

## 2021-01-01 RX ORDER — SODIUM CHLORIDE 9 MG/ML
1000 INJECTION, SOLUTION INTRAVENOUS
Refills: 0 | Status: DISCONTINUED | OUTPATIENT
Start: 2021-01-01 | End: 2021-01-01

## 2021-01-01 RX ORDER — ASCORBIC ACID 60 MG
500 TABLET,CHEWABLE ORAL DAILY
Refills: 0 | Status: DISCONTINUED | OUTPATIENT
Start: 2021-01-01 | End: 2021-01-01

## 2021-01-01 RX ORDER — MORPHINE SULFATE 50 MG/1
1 CAPSULE, EXTENDED RELEASE ORAL
Refills: 0 | Status: DISCONTINUED | OUTPATIENT
Start: 2021-01-01 | End: 2021-01-01

## 2021-01-01 RX ORDER — ESCITALOPRAM OXALATE 10 MG/1
20 TABLET, FILM COATED ORAL DAILY
Refills: 0 | Status: DISCONTINUED | OUTPATIENT
Start: 2021-01-01 | End: 2021-01-01

## 2021-01-01 RX ORDER — CARVEDILOL PHOSPHATE 80 MG/1
1 CAPSULE, EXTENDED RELEASE ORAL
Qty: 0 | Refills: 0 | DISCHARGE

## 2021-01-01 RX ORDER — DUTASTERIDE 0.5 MG/1
1 CAPSULE, LIQUID FILLED ORAL
Qty: 0 | Refills: 0 | DISCHARGE

## 2021-01-01 RX ORDER — ROBINUL 0.2 MG/ML
0.4 INJECTION INTRAMUSCULAR; INTRAVENOUS EVERY 4 HOURS
Refills: 0 | Status: DISCONTINUED | OUTPATIENT
Start: 2021-01-01 | End: 2021-01-01

## 2021-01-01 RX ORDER — TAMSULOSIN HYDROCHLORIDE 0.4 MG/1
1 CAPSULE ORAL
Qty: 0 | Refills: 0 | DISCHARGE

## 2021-01-01 RX ORDER — CARVEDILOL PHOSPHATE 80 MG/1
3.12 CAPSULE, EXTENDED RELEASE ORAL EVERY 12 HOURS
Refills: 0 | Status: DISCONTINUED | OUTPATIENT
Start: 2021-01-01 | End: 2021-01-01

## 2021-01-01 RX ORDER — HEPARIN SODIUM 5000 [USP'U]/ML
5000 INJECTION INTRAVENOUS; SUBCUTANEOUS EVERY 8 HOURS
Refills: 0 | Status: DISCONTINUED | OUTPATIENT
Start: 2021-01-01 | End: 2021-01-01

## 2021-01-01 RX ORDER — CEFTRIAXONE 500 MG/1
1000 INJECTION, POWDER, FOR SOLUTION INTRAMUSCULAR; INTRAVENOUS EVERY 24 HOURS
Refills: 0 | Status: DISCONTINUED | OUTPATIENT
Start: 2021-01-01 | End: 2021-01-01

## 2021-01-01 RX ORDER — ESCITALOPRAM OXALATE 10 MG/1
1 TABLET, FILM COATED ORAL
Qty: 0 | Refills: 0 | DISCHARGE

## 2021-01-01 RX ORDER — OXCARBAZEPINE 300 MG/1
300 TABLET, FILM COATED ORAL TWICE DAILY
Qty: 180 | Refills: 1 | Status: COMPLETED | COMMUNITY

## 2021-01-01 RX ORDER — CARVEDILOL 3.12 MG/1
3.12 TABLET, FILM COATED ORAL
Qty: 180 | Refills: 1 | Status: ACTIVE | COMMUNITY
Start: 2019-02-26 | End: 1900-01-01

## 2021-01-01 RX ORDER — ACETAMINOPHEN 500 MG
650 TABLET ORAL EVERY 4 HOURS
Refills: 0 | Status: DISCONTINUED | OUTPATIENT
Start: 2021-01-01 | End: 2021-01-01

## 2021-01-01 RX ORDER — ALBUTEROL 90 UG/1
10 AEROSOL, METERED ORAL ONCE
Refills: 0 | Status: DISCONTINUED | OUTPATIENT
Start: 2021-01-01 | End: 2021-01-01

## 2021-01-01 RX ORDER — ONDANSETRON 4 MG/1
4 TABLET ORAL EVERY 8 HOURS
Qty: 30 | Refills: 0 | Status: ACTIVE | COMMUNITY
Start: 2021-01-01 | End: 1900-01-01

## 2021-01-01 RX ORDER — MORPHINE SULFATE 50 MG/1
2 CAPSULE, EXTENDED RELEASE ORAL
Refills: 0 | Status: DISCONTINUED | OUTPATIENT
Start: 2021-01-01 | End: 2021-01-01

## 2021-01-01 RX ORDER — ROBINUL 0.2 MG/ML
0.2 INJECTION INTRAMUSCULAR; INTRAVENOUS EVERY 6 HOURS
Refills: 0 | Status: DISCONTINUED | OUTPATIENT
Start: 2021-01-01 | End: 2021-01-01

## 2021-01-01 RX ORDER — PANTOPRAZOLE SODIUM 20 MG/1
40 TABLET, DELAYED RELEASE ORAL
Refills: 0 | Status: DISCONTINUED | OUTPATIENT
Start: 2021-01-01 | End: 2021-01-01

## 2021-01-01 RX ORDER — PIPERACILLIN AND TAZOBACTAM 4; .5 G/20ML; G/20ML
3.38 INJECTION, POWDER, LYOPHILIZED, FOR SOLUTION INTRAVENOUS ONCE
Refills: 0 | Status: COMPLETED | OUTPATIENT
Start: 2021-01-01 | End: 2021-01-01

## 2021-01-01 RX ORDER — ESCITALOPRAM OXALATE 20 MG/1
20 TABLET ORAL DAILY
Qty: 90 | Refills: 1 | Status: ACTIVE | COMMUNITY
Start: 1900-01-01 | End: 1900-01-01

## 2021-01-01 RX ORDER — OXCARBAZEPINE 300 MG/1
1 TABLET, FILM COATED ORAL
Qty: 0 | Refills: 0 | DISCHARGE

## 2021-01-01 RX ORDER — ROBINUL 0.2 MG/ML
2 INJECTION INTRAMUSCULAR; INTRAVENOUS EVERY 4 HOURS
Refills: 0 | Status: DISCONTINUED | OUTPATIENT
Start: 2021-01-01 | End: 2021-01-01

## 2021-01-01 RX ORDER — ROBINUL 0.2 MG/ML
0.2 INJECTION INTRAMUSCULAR; INTRAVENOUS EVERY 4 HOURS
Refills: 0 | Status: DISCONTINUED | OUTPATIENT
Start: 2021-01-01 | End: 2021-01-01

## 2021-01-01 RX ORDER — ACETAMINOPHEN 500 MG
650 TABLET ORAL ONCE
Refills: 0 | Status: COMPLETED | OUTPATIENT
Start: 2021-01-01 | End: 2021-01-01

## 2021-01-01 RX ORDER — MAGNESIUM SULFATE 500 MG/ML
2 VIAL (ML) INJECTION ONCE
Refills: 0 | Status: COMPLETED | OUTPATIENT
Start: 2021-01-01 | End: 2021-01-01

## 2021-01-01 RX ORDER — PIPERACILLIN AND TAZOBACTAM 4; .5 G/20ML; G/20ML
3.38 INJECTION, POWDER, LYOPHILIZED, FOR SOLUTION INTRAVENOUS EVERY 8 HOURS
Refills: 0 | Status: COMPLETED | OUTPATIENT
Start: 2021-01-01 | End: 2021-01-01

## 2021-01-01 RX ORDER — TAMSULOSIN HYDROCHLORIDE 0.4 MG/1
0.4 CAPSULE ORAL
Qty: 90 | Refills: 3 | Status: ACTIVE | COMMUNITY
Start: 2018-12-11 | End: 1900-01-01

## 2021-01-01 RX ADMIN — CARVEDILOL PHOSPHATE 3.12 MILLIGRAM(S): 80 CAPSULE, EXTENDED RELEASE ORAL at 17:29

## 2021-01-01 RX ADMIN — FINASTERIDE 5 MILLIGRAM(S): 5 TABLET, FILM COATED ORAL at 13:30

## 2021-01-01 RX ADMIN — Medication 500 MILLIGRAM(S): at 12:05

## 2021-01-01 RX ADMIN — PIPERACILLIN AND TAZOBACTAM 25 GRAM(S): 4; .5 INJECTION, POWDER, LYOPHILIZED, FOR SOLUTION INTRAVENOUS at 17:40

## 2021-01-01 RX ADMIN — FINASTERIDE 5 MILLIGRAM(S): 5 TABLET, FILM COATED ORAL at 12:05

## 2021-01-01 RX ADMIN — CARVEDILOL PHOSPHATE 3.12 MILLIGRAM(S): 80 CAPSULE, EXTENDED RELEASE ORAL at 17:06

## 2021-01-01 RX ADMIN — Medication 500 MILLIGRAM(S): at 12:13

## 2021-01-01 RX ADMIN — HEPARIN SODIUM 5000 UNIT(S): 5000 INJECTION INTRAVENOUS; SUBCUTANEOUS at 04:38

## 2021-01-01 RX ADMIN — ESCITALOPRAM OXALATE 20 MILLIGRAM(S): 10 TABLET, FILM COATED ORAL at 12:07

## 2021-01-01 RX ADMIN — Medication 6 MILLIGRAM(S): at 05:23

## 2021-01-01 RX ADMIN — OXCARBAZEPINE 300 MILLIGRAM(S): 300 TABLET, FILM COATED ORAL at 09:56

## 2021-01-01 RX ADMIN — OXCARBAZEPINE 300 MILLIGRAM(S): 300 TABLET, FILM COATED ORAL at 17:34

## 2021-01-01 RX ADMIN — FINASTERIDE 5 MILLIGRAM(S): 5 TABLET, FILM COATED ORAL at 12:46

## 2021-01-01 RX ADMIN — OXCARBAZEPINE 300 MILLIGRAM(S): 300 TABLET, FILM COATED ORAL at 07:45

## 2021-01-01 RX ADMIN — CARVEDILOL PHOSPHATE 3.12 MILLIGRAM(S): 80 CAPSULE, EXTENDED RELEASE ORAL at 06:05

## 2021-01-01 RX ADMIN — HEPARIN SODIUM 5000 UNIT(S): 5000 INJECTION INTRAVENOUS; SUBCUTANEOUS at 14:40

## 2021-01-01 RX ADMIN — Medication 6 MILLIGRAM(S): at 06:05

## 2021-01-01 RX ADMIN — ZINC SULFATE TAB 220 MG (50 MG ZINC EQUIVALENT) 220 MILLIGRAM(S): 220 (50 ZN) TAB at 12:47

## 2021-01-01 RX ADMIN — Medication 500 MILLIGRAM(S): at 12:07

## 2021-01-01 RX ADMIN — OXCARBAZEPINE 300 MILLIGRAM(S): 300 TABLET, FILM COATED ORAL at 17:38

## 2021-01-01 RX ADMIN — HEPARIN SODIUM 5000 UNIT(S): 5000 INJECTION INTRAVENOUS; SUBCUTANEOUS at 13:33

## 2021-01-01 RX ADMIN — HEPARIN SODIUM 5000 UNIT(S): 5000 INJECTION INTRAVENOUS; SUBCUTANEOUS at 22:03

## 2021-01-01 RX ADMIN — ESCITALOPRAM OXALATE 20 MILLIGRAM(S): 10 TABLET, FILM COATED ORAL at 12:47

## 2021-01-01 RX ADMIN — PIPERACILLIN AND TAZOBACTAM 25 GRAM(S): 4; .5 INJECTION, POWDER, LYOPHILIZED, FOR SOLUTION INTRAVENOUS at 16:23

## 2021-01-01 RX ADMIN — CARVEDILOL PHOSPHATE 3.12 MILLIGRAM(S): 80 CAPSULE, EXTENDED RELEASE ORAL at 17:58

## 2021-01-01 RX ADMIN — PANTOPRAZOLE SODIUM 40 MILLIGRAM(S): 20 TABLET, DELAYED RELEASE ORAL at 05:31

## 2021-01-01 RX ADMIN — PIPERACILLIN AND TAZOBACTAM 25 GRAM(S): 4; .5 INJECTION, POWDER, LYOPHILIZED, FOR SOLUTION INTRAVENOUS at 17:36

## 2021-01-01 RX ADMIN — Medication 6 MILLIGRAM(S): at 05:30

## 2021-01-01 RX ADMIN — OXCARBAZEPINE 300 MILLIGRAM(S): 300 TABLET, FILM COATED ORAL at 04:57

## 2021-01-01 RX ADMIN — PIPERACILLIN AND TAZOBACTAM 25 GRAM(S): 4; .5 INJECTION, POWDER, LYOPHILIZED, FOR SOLUTION INTRAVENOUS at 06:14

## 2021-01-01 RX ADMIN — PIPERACILLIN AND TAZOBACTAM 25 GRAM(S): 4; .5 INJECTION, POWDER, LYOPHILIZED, FOR SOLUTION INTRAVENOUS at 09:37

## 2021-01-01 RX ADMIN — HEPARIN SODIUM 5000 UNIT(S): 5000 INJECTION INTRAVENOUS; SUBCUTANEOUS at 12:31

## 2021-01-01 RX ADMIN — SODIUM CHLORIDE 50 MILLILITER(S): 9 INJECTION, SOLUTION INTRAVENOUS at 12:40

## 2021-01-01 RX ADMIN — HEPARIN SODIUM 5000 UNIT(S): 5000 INJECTION INTRAVENOUS; SUBCUTANEOUS at 06:14

## 2021-01-01 RX ADMIN — HEPARIN SODIUM 5000 UNIT(S): 5000 INJECTION INTRAVENOUS; SUBCUTANEOUS at 13:46

## 2021-01-01 RX ADMIN — OXCARBAZEPINE 300 MILLIGRAM(S): 300 TABLET, FILM COATED ORAL at 17:09

## 2021-01-01 RX ADMIN — CEFTRIAXONE 100 MILLIGRAM(S): 500 INJECTION, POWDER, FOR SOLUTION INTRAMUSCULAR; INTRAVENOUS at 01:13

## 2021-01-01 RX ADMIN — HEPARIN SODIUM 5000 UNIT(S): 5000 INJECTION INTRAVENOUS; SUBCUTANEOUS at 20:56

## 2021-01-01 RX ADMIN — Medication 50 GRAM(S): at 20:22

## 2021-01-01 RX ADMIN — ESCITALOPRAM OXALATE 20 MILLIGRAM(S): 10 TABLET, FILM COATED ORAL at 12:49

## 2021-01-01 RX ADMIN — ROBINUL 0.2 MILLIGRAM(S): 0.2 INJECTION INTRAMUSCULAR; INTRAVENOUS at 12:28

## 2021-01-01 RX ADMIN — PIPERACILLIN AND TAZOBACTAM 200 GRAM(S): 4; .5 INJECTION, POWDER, LYOPHILIZED, FOR SOLUTION INTRAVENOUS at 09:15

## 2021-01-01 RX ADMIN — HEPARIN SODIUM 5000 UNIT(S): 5000 INJECTION INTRAVENOUS; SUBCUTANEOUS at 06:02

## 2021-01-01 RX ADMIN — HEPARIN SODIUM 5000 UNIT(S): 5000 INJECTION INTRAVENOUS; SUBCUTANEOUS at 21:16

## 2021-01-01 RX ADMIN — HEPARIN SODIUM 5000 UNIT(S): 5000 INJECTION INTRAVENOUS; SUBCUTANEOUS at 05:07

## 2021-01-01 RX ADMIN — CARVEDILOL PHOSPHATE 3.12 MILLIGRAM(S): 80 CAPSULE, EXTENDED RELEASE ORAL at 17:38

## 2021-01-01 RX ADMIN — OXCARBAZEPINE 300 MILLIGRAM(S): 300 TABLET, FILM COATED ORAL at 17:06

## 2021-01-01 RX ADMIN — ZINC SULFATE TAB 220 MG (50 MG ZINC EQUIVALENT) 220 MILLIGRAM(S): 220 (50 ZN) TAB at 12:06

## 2021-01-01 RX ADMIN — ROBINUL 0.2 MILLIGRAM(S): 0.2 INJECTION INTRAMUSCULAR; INTRAVENOUS at 19:36

## 2021-01-01 RX ADMIN — CEFTRIAXONE 100 MILLIGRAM(S): 500 INJECTION, POWDER, FOR SOLUTION INTRAMUSCULAR; INTRAVENOUS at 00:48

## 2021-01-01 RX ADMIN — PIPERACILLIN AND TAZOBACTAM 25 GRAM(S): 4; .5 INJECTION, POWDER, LYOPHILIZED, FOR SOLUTION INTRAVENOUS at 11:52

## 2021-01-01 RX ADMIN — HEPARIN SODIUM 5000 UNIT(S): 5000 INJECTION INTRAVENOUS; SUBCUTANEOUS at 12:13

## 2021-01-01 RX ADMIN — OXCARBAZEPINE 300 MILLIGRAM(S): 300 TABLET, FILM COATED ORAL at 06:06

## 2021-01-01 RX ADMIN — ZINC SULFATE TAB 220 MG (50 MG ZINC EQUIVALENT) 220 MILLIGRAM(S): 220 (50 ZN) TAB at 12:13

## 2021-01-01 RX ADMIN — FINASTERIDE 5 MILLIGRAM(S): 5 TABLET, FILM COATED ORAL at 12:13

## 2021-01-01 RX ADMIN — HEPARIN SODIUM 5000 UNIT(S): 5000 INJECTION INTRAVENOUS; SUBCUTANEOUS at 21:13

## 2021-01-01 RX ADMIN — CARVEDILOL PHOSPHATE 3.12 MILLIGRAM(S): 80 CAPSULE, EXTENDED RELEASE ORAL at 04:57

## 2021-01-01 RX ADMIN — SODIUM CHLORIDE 100 MILLILITER(S): 9 INJECTION, SOLUTION INTRAVENOUS at 18:10

## 2021-01-01 RX ADMIN — CARVEDILOL PHOSPHATE 3.12 MILLIGRAM(S): 80 CAPSULE, EXTENDED RELEASE ORAL at 06:15

## 2021-01-01 RX ADMIN — PIPERACILLIN AND TAZOBACTAM 25 GRAM(S): 4; .5 INJECTION, POWDER, LYOPHILIZED, FOR SOLUTION INTRAVENOUS at 09:18

## 2021-01-01 RX ADMIN — HEPARIN SODIUM 5000 UNIT(S): 5000 INJECTION INTRAVENOUS; SUBCUTANEOUS at 13:03

## 2021-01-01 RX ADMIN — CEFTRIAXONE 100 MILLIGRAM(S): 500 INJECTION, POWDER, FOR SOLUTION INTRAMUSCULAR; INTRAVENOUS at 06:15

## 2021-01-01 RX ADMIN — HEPARIN SODIUM 5000 UNIT(S): 5000 INJECTION INTRAVENOUS; SUBCUTANEOUS at 04:57

## 2021-01-01 RX ADMIN — Medication 6 MILLIGRAM(S): at 04:57

## 2021-01-01 RX ADMIN — PANTOPRAZOLE SODIUM 40 MILLIGRAM(S): 20 TABLET, DELAYED RELEASE ORAL at 07:30

## 2021-01-01 RX ADMIN — PANTOPRAZOLE SODIUM 40 MILLIGRAM(S): 20 TABLET, DELAYED RELEASE ORAL at 06:15

## 2021-01-01 RX ADMIN — CEFTRIAXONE 100 MILLIGRAM(S): 500 INJECTION, POWDER, FOR SOLUTION INTRAMUSCULAR; INTRAVENOUS at 00:43

## 2021-01-01 RX ADMIN — HEPARIN SODIUM 5000 UNIT(S): 5000 INJECTION INTRAVENOUS; SUBCUTANEOUS at 04:39

## 2021-01-01 RX ADMIN — PIPERACILLIN AND TAZOBACTAM 25 GRAM(S): 4; .5 INJECTION, POWDER, LYOPHILIZED, FOR SOLUTION INTRAVENOUS at 18:07

## 2021-01-01 RX ADMIN — CARVEDILOL PHOSPHATE 3.12 MILLIGRAM(S): 80 CAPSULE, EXTENDED RELEASE ORAL at 18:01

## 2021-01-01 RX ADMIN — SODIUM CHLORIDE 75 MILLILITER(S): 9 INJECTION, SOLUTION INTRAVENOUS at 16:50

## 2021-01-01 RX ADMIN — HEPARIN SODIUM 5000 UNIT(S): 5000 INJECTION INTRAVENOUS; SUBCUTANEOUS at 20:19

## 2021-01-01 RX ADMIN — ESCITALOPRAM OXALATE 20 MILLIGRAM(S): 10 TABLET, FILM COATED ORAL at 13:52

## 2021-01-01 RX ADMIN — HEPARIN SODIUM 5000 UNIT(S): 5000 INJECTION INTRAVENOUS; SUBCUTANEOUS at 12:40

## 2021-01-01 RX ADMIN — HEPARIN SODIUM 5000 UNIT(S): 5000 INJECTION INTRAVENOUS; SUBCUTANEOUS at 05:23

## 2021-01-01 RX ADMIN — ZINC SULFATE TAB 220 MG (50 MG ZINC EQUIVALENT) 220 MILLIGRAM(S): 220 (50 ZN) TAB at 13:30

## 2021-01-01 RX ADMIN — Medication 500 MILLIGRAM(S): at 13:30

## 2021-01-01 RX ADMIN — SODIUM CHLORIDE 100 MILLILITER(S): 9 INJECTION, SOLUTION INTRAVENOUS at 16:41

## 2021-01-01 RX ADMIN — Medication 6 MILLIGRAM(S): at 06:10

## 2021-01-01 RX ADMIN — HEPARIN SODIUM 5000 UNIT(S): 5000 INJECTION INTRAVENOUS; SUBCUTANEOUS at 06:47

## 2021-01-01 RX ADMIN — HEPARIN SODIUM 5000 UNIT(S): 5000 INJECTION INTRAVENOUS; SUBCUTANEOUS at 11:51

## 2021-01-01 RX ADMIN — PIPERACILLIN AND TAZOBACTAM 25 GRAM(S): 4; .5 INJECTION, POWDER, LYOPHILIZED, FOR SOLUTION INTRAVENOUS at 07:44

## 2021-01-01 RX ADMIN — HEPARIN SODIUM 5000 UNIT(S): 5000 INJECTION INTRAVENOUS; SUBCUTANEOUS at 04:13

## 2021-01-01 RX ADMIN — CEFTRIAXONE 100 MILLIGRAM(S): 500 INJECTION, POWDER, FOR SOLUTION INTRAMUSCULAR; INTRAVENOUS at 00:57

## 2021-01-01 RX ADMIN — SODIUM CHLORIDE 50 MILLILITER(S): 9 INJECTION, SOLUTION INTRAVENOUS at 22:01

## 2021-01-01 RX ADMIN — HEPARIN SODIUM 5000 UNIT(S): 5000 INJECTION INTRAVENOUS; SUBCUTANEOUS at 00:50

## 2021-01-01 RX ADMIN — Medication 6 MILLIGRAM(S): at 06:02

## 2021-01-01 RX ADMIN — OXCARBAZEPINE 300 MILLIGRAM(S): 300 TABLET, FILM COATED ORAL at 04:13

## 2021-01-01 RX ADMIN — OXCARBAZEPINE 300 MILLIGRAM(S): 300 TABLET, FILM COATED ORAL at 06:16

## 2021-01-01 RX ADMIN — TAMSULOSIN HYDROCHLORIDE 0.4 MILLIGRAM(S): 0.4 CAPSULE ORAL at 20:26

## 2021-01-01 RX ADMIN — HEPARIN SODIUM 5000 UNIT(S): 5000 INJECTION INTRAVENOUS; SUBCUTANEOUS at 21:22

## 2021-01-01 RX ADMIN — HEPARIN SODIUM 5000 UNIT(S): 5000 INJECTION INTRAVENOUS; SUBCUTANEOUS at 14:41

## 2021-01-01 RX ADMIN — PIPERACILLIN AND TAZOBACTAM 25 GRAM(S): 4; .5 INJECTION, POWDER, LYOPHILIZED, FOR SOLUTION INTRAVENOUS at 00:54

## 2021-01-01 RX ADMIN — PANTOPRAZOLE SODIUM 40 MILLIGRAM(S): 20 TABLET, DELAYED RELEASE ORAL at 05:11

## 2021-01-01 RX ADMIN — CARVEDILOL PHOSPHATE 3.12 MILLIGRAM(S): 80 CAPSULE, EXTENDED RELEASE ORAL at 06:11

## 2021-01-01 RX ADMIN — PIPERACILLIN AND TAZOBACTAM 25 GRAM(S): 4; .5 INJECTION, POWDER, LYOPHILIZED, FOR SOLUTION INTRAVENOUS at 01:00

## 2021-01-01 RX ADMIN — HEPARIN SODIUM 5000 UNIT(S): 5000 INJECTION INTRAVENOUS; SUBCUTANEOUS at 20:26

## 2021-01-01 RX ADMIN — SODIUM CHLORIDE 50 MILLILITER(S): 9 INJECTION, SOLUTION INTRAVENOUS at 17:40

## 2021-01-01 RX ADMIN — TAMSULOSIN HYDROCHLORIDE 0.4 MILLIGRAM(S): 0.4 CAPSULE ORAL at 22:07

## 2021-01-01 RX ADMIN — Medication 6 MILLIGRAM(S): at 06:16

## 2021-01-01 RX ADMIN — HEPARIN SODIUM 5000 UNIT(S): 5000 INJECTION INTRAVENOUS; SUBCUTANEOUS at 05:13

## 2021-01-01 RX ADMIN — PANTOPRAZOLE SODIUM 40 MILLIGRAM(S): 20 TABLET, DELAYED RELEASE ORAL at 06:55

## 2021-01-01 RX ADMIN — ROBINUL 0.2 MILLIGRAM(S): 0.2 INJECTION INTRAMUSCULAR; INTRAVENOUS at 17:41

## 2021-01-01 RX ADMIN — PIPERACILLIN AND TAZOBACTAM 25 GRAM(S): 4; .5 INJECTION, POWDER, LYOPHILIZED, FOR SOLUTION INTRAVENOUS at 02:35

## 2021-01-01 RX ADMIN — POTASSIUM PHOSPHATE, MONOBASIC POTASSIUM PHOSPHATE, DIBASIC 62.5 MILLIMOLE(S): 236; 224 INJECTION, SOLUTION INTRAVENOUS at 12:40

## 2021-01-01 RX ADMIN — HEPARIN SODIUM 5000 UNIT(S): 5000 INJECTION INTRAVENOUS; SUBCUTANEOUS at 22:10

## 2021-01-01 RX ADMIN — PIPERACILLIN AND TAZOBACTAM 25 GRAM(S): 4; .5 INJECTION, POWDER, LYOPHILIZED, FOR SOLUTION INTRAVENOUS at 18:24

## 2021-01-01 RX ADMIN — HEPARIN SODIUM 5000 UNIT(S): 5000 INJECTION INTRAVENOUS; SUBCUTANEOUS at 21:37

## 2021-01-01 RX ADMIN — HEPARIN SODIUM 5000 UNIT(S): 5000 INJECTION INTRAVENOUS; SUBCUTANEOUS at 04:52

## 2021-01-01 RX ADMIN — PIPERACILLIN AND TAZOBACTAM 25 GRAM(S): 4; .5 INJECTION, POWDER, LYOPHILIZED, FOR SOLUTION INTRAVENOUS at 18:09

## 2021-01-01 RX ADMIN — CARVEDILOL PHOSPHATE 3.12 MILLIGRAM(S): 80 CAPSULE, EXTENDED RELEASE ORAL at 05:13

## 2021-01-01 RX ADMIN — OXCARBAZEPINE 300 MILLIGRAM(S): 300 TABLET, FILM COATED ORAL at 05:14

## 2021-01-01 RX ADMIN — CARVEDILOL PHOSPHATE 3.12 MILLIGRAM(S): 80 CAPSULE, EXTENDED RELEASE ORAL at 05:31

## 2021-01-01 RX ADMIN — Medication 650 MILLIGRAM(S): at 22:05

## 2021-01-01 RX ADMIN — Medication 6 MILLIGRAM(S): at 22:10

## 2021-01-01 RX ADMIN — TAMSULOSIN HYDROCHLORIDE 0.4 MILLIGRAM(S): 0.4 CAPSULE ORAL at 22:25

## 2021-01-01 RX ADMIN — HEPARIN SODIUM 5000 UNIT(S): 5000 INJECTION INTRAVENOUS; SUBCUTANEOUS at 06:16

## 2021-01-01 RX ADMIN — HEPARIN SODIUM 5000 UNIT(S): 5000 INJECTION INTRAVENOUS; SUBCUTANEOUS at 06:40

## 2021-01-01 RX ADMIN — FINASTERIDE 5 MILLIGRAM(S): 5 TABLET, FILM COATED ORAL at 13:51

## 2021-01-01 RX ADMIN — PIPERACILLIN AND TAZOBACTAM 25 GRAM(S): 4; .5 INJECTION, POWDER, LYOPHILIZED, FOR SOLUTION INTRAVENOUS at 00:33

## 2021-01-01 RX ADMIN — HEPARIN SODIUM 5000 UNIT(S): 5000 INJECTION INTRAVENOUS; SUBCUTANEOUS at 00:02

## 2021-01-01 RX ADMIN — HEPARIN SODIUM 5000 UNIT(S): 5000 INJECTION INTRAVENOUS; SUBCUTANEOUS at 13:00

## 2021-01-01 RX ADMIN — PIPERACILLIN AND TAZOBACTAM 25 GRAM(S): 4; .5 INJECTION, POWDER, LYOPHILIZED, FOR SOLUTION INTRAVENOUS at 00:11

## 2021-01-01 RX ADMIN — SODIUM CHLORIDE 100 MILLILITER(S): 9 INJECTION, SOLUTION INTRAVENOUS at 12:14

## 2021-01-01 RX ADMIN — TAMSULOSIN HYDROCHLORIDE 0.4 MILLIGRAM(S): 0.4 CAPSULE ORAL at 21:37

## 2021-01-01 RX ADMIN — POTASSIUM PHOSPHATE, MONOBASIC POTASSIUM PHOSPHATE, DIBASIC 62.5 MILLIMOLE(S): 236; 224 INJECTION, SOLUTION INTRAVENOUS at 22:12

## 2021-01-01 RX ADMIN — HEPARIN SODIUM 5000 UNIT(S): 5000 INJECTION INTRAVENOUS; SUBCUTANEOUS at 22:47

## 2021-01-01 RX ADMIN — HEPARIN SODIUM 5000 UNIT(S): 5000 INJECTION INTRAVENOUS; SUBCUTANEOUS at 14:25

## 2021-01-01 RX ADMIN — ESCITALOPRAM OXALATE 20 MILLIGRAM(S): 10 TABLET, FILM COATED ORAL at 14:41

## 2021-01-01 RX ADMIN — SODIUM CHLORIDE 55 MILLILITER(S): 9 INJECTION, SOLUTION INTRAVENOUS at 17:22

## 2021-01-01 RX ADMIN — CARVEDILOL PHOSPHATE 3.12 MILLIGRAM(S): 80 CAPSULE, EXTENDED RELEASE ORAL at 04:14

## 2021-01-01 RX ADMIN — HEPARIN SODIUM 5000 UNIT(S): 5000 INJECTION INTRAVENOUS; SUBCUTANEOUS at 22:25

## 2021-01-01 RX ADMIN — HEPARIN SODIUM 5000 UNIT(S): 5000 INJECTION INTRAVENOUS; SUBCUTANEOUS at 12:46

## 2021-01-01 RX ADMIN — CARVEDILOL PHOSPHATE 3.12 MILLIGRAM(S): 80 CAPSULE, EXTENDED RELEASE ORAL at 17:09

## 2021-01-01 RX ADMIN — HEPARIN SODIUM 5000 UNIT(S): 5000 INJECTION INTRAVENOUS; SUBCUTANEOUS at 04:55

## 2021-01-01 RX ADMIN — ESCITALOPRAM OXALATE 20 MILLIGRAM(S): 10 TABLET, FILM COATED ORAL at 13:30

## 2021-01-01 RX ADMIN — OXCARBAZEPINE 300 MILLIGRAM(S): 300 TABLET, FILM COATED ORAL at 17:29

## 2021-01-01 RX ADMIN — OXCARBAZEPINE 300 MILLIGRAM(S): 300 TABLET, FILM COATED ORAL at 18:01

## 2021-01-01 RX ADMIN — PIPERACILLIN AND TAZOBACTAM 25 GRAM(S): 4; .5 INJECTION, POWDER, LYOPHILIZED, FOR SOLUTION INTRAVENOUS at 00:32

## 2021-01-01 RX ADMIN — Medication 500 MILLIGRAM(S): at 12:46

## 2021-01-01 RX ADMIN — PIPERACILLIN AND TAZOBACTAM 25 GRAM(S): 4; .5 INJECTION, POWDER, LYOPHILIZED, FOR SOLUTION INTRAVENOUS at 00:02

## 2021-01-01 RX ADMIN — FINASTERIDE 5 MILLIGRAM(S): 5 TABLET, FILM COATED ORAL at 14:41

## 2021-01-01 RX ADMIN — ESCITALOPRAM OXALATE 20 MILLIGRAM(S): 10 TABLET, FILM COATED ORAL at 12:05

## 2021-01-01 RX ADMIN — PANTOPRAZOLE SODIUM 40 MILLIGRAM(S): 20 TABLET, DELAYED RELEASE ORAL at 06:01

## 2021-01-01 RX ADMIN — HEPARIN SODIUM 5000 UNIT(S): 5000 INJECTION INTRAVENOUS; SUBCUTANEOUS at 06:05

## 2021-01-01 RX ADMIN — SODIUM CHLORIDE 100 MILLILITER(S): 9 INJECTION, SOLUTION INTRAVENOUS at 04:38

## 2021-01-01 RX ADMIN — PIPERACILLIN AND TAZOBACTAM 25 GRAM(S): 4; .5 INJECTION, POWDER, LYOPHILIZED, FOR SOLUTION INTRAVENOUS at 17:56

## 2021-01-01 RX ADMIN — SODIUM POLYSTYRENE SULFONATE 15 GRAM(S): 4.1 POWDER, FOR SUSPENSION ORAL at 09:27

## 2021-01-01 RX ADMIN — FINASTERIDE 5 MILLIGRAM(S): 5 TABLET, FILM COATED ORAL at 11:41

## 2021-01-01 RX ADMIN — HEPARIN SODIUM 5000 UNIT(S): 5000 INJECTION INTRAVENOUS; SUBCUTANEOUS at 14:35

## 2021-01-01 RX ADMIN — HEPARIN SODIUM 5000 UNIT(S): 5000 INJECTION INTRAVENOUS; SUBCUTANEOUS at 12:07

## 2021-01-01 RX ADMIN — HEPARIN SODIUM 5000 UNIT(S): 5000 INJECTION INTRAVENOUS; SUBCUTANEOUS at 21:59

## 2021-01-01 RX ADMIN — SODIUM CHLORIDE 100 MILLILITER(S): 9 INJECTION, SOLUTION INTRAVENOUS at 05:04

## 2021-01-01 RX ADMIN — PIPERACILLIN AND TAZOBACTAM 25 GRAM(S): 4; .5 INJECTION, POWDER, LYOPHILIZED, FOR SOLUTION INTRAVENOUS at 09:46

## 2021-01-01 RX ADMIN — HEPARIN SODIUM 5000 UNIT(S): 5000 INJECTION INTRAVENOUS; SUBCUTANEOUS at 13:30

## 2021-01-01 RX ADMIN — SODIUM CHLORIDE 50 MILLILITER(S): 9 INJECTION, SOLUTION INTRAVENOUS at 11:42

## 2021-01-01 RX ADMIN — TAMSULOSIN HYDROCHLORIDE 0.4 MILLIGRAM(S): 0.4 CAPSULE ORAL at 00:49

## 2021-01-01 RX ADMIN — SODIUM CHLORIDE 75 MILLILITER(S): 9 INJECTION, SOLUTION INTRAVENOUS at 17:08

## 2021-01-01 RX ADMIN — PIPERACILLIN AND TAZOBACTAM 25 GRAM(S): 4; .5 INJECTION, POWDER, LYOPHILIZED, FOR SOLUTION INTRAVENOUS at 12:00

## 2021-01-01 RX ADMIN — CARVEDILOL PHOSPHATE 3.12 MILLIGRAM(S): 80 CAPSULE, EXTENDED RELEASE ORAL at 17:35

## 2021-01-01 RX ADMIN — TAMSULOSIN HYDROCHLORIDE 0.4 MILLIGRAM(S): 0.4 CAPSULE ORAL at 21:36

## 2021-01-01 RX ADMIN — ZINC SULFATE TAB 220 MG (50 MG ZINC EQUIVALENT) 220 MILLIGRAM(S): 220 (50 ZN) TAB at 12:07

## 2021-01-01 RX ADMIN — TAMSULOSIN HYDROCHLORIDE 0.4 MILLIGRAM(S): 0.4 CAPSULE ORAL at 22:48

## 2021-01-01 RX ADMIN — SODIUM CHLORIDE 100 MILLILITER(S): 9 INJECTION, SOLUTION INTRAVENOUS at 04:33

## 2021-01-01 RX ADMIN — Medication 6 MILLIGRAM(S): at 00:50

## 2021-01-01 RX ADMIN — TAMSULOSIN HYDROCHLORIDE 0.4 MILLIGRAM(S): 0.4 CAPSULE ORAL at 21:14

## 2021-01-01 RX ADMIN — HEPARIN SODIUM 5000 UNIT(S): 5000 INJECTION INTRAVENOUS; SUBCUTANEOUS at 05:30

## 2021-01-01 RX ADMIN — HEPARIN SODIUM 5000 UNIT(S): 5000 INJECTION INTRAVENOUS; SUBCUTANEOUS at 13:51

## 2021-01-01 RX ADMIN — OXCARBAZEPINE 300 MILLIGRAM(S): 300 TABLET, FILM COATED ORAL at 17:58

## 2021-01-01 RX ADMIN — PIPERACILLIN AND TAZOBACTAM 25 GRAM(S): 4; .5 INJECTION, POWDER, LYOPHILIZED, FOR SOLUTION INTRAVENOUS at 17:22

## 2021-01-01 RX ADMIN — Medication 6 MILLIGRAM(S): at 04:13

## 2021-01-01 RX ADMIN — PIPERACILLIN AND TAZOBACTAM 25 GRAM(S): 4; .5 INJECTION, POWDER, LYOPHILIZED, FOR SOLUTION INTRAVENOUS at 00:39

## 2021-01-01 RX ADMIN — HEPARIN SODIUM 5000 UNIT(S): 5000 INJECTION INTRAVENOUS; SUBCUTANEOUS at 22:07

## 2021-01-01 RX ADMIN — ESCITALOPRAM OXALATE 20 MILLIGRAM(S): 10 TABLET, FILM COATED ORAL at 12:16

## 2021-01-01 RX ADMIN — FINASTERIDE 5 MILLIGRAM(S): 5 TABLET, FILM COATED ORAL at 12:07

## 2021-01-01 RX ADMIN — ROBINUL 0.2 MILLIGRAM(S): 0.2 INJECTION INTRAMUSCULAR; INTRAVENOUS at 13:33

## 2021-01-01 RX ADMIN — Medication 6 MILLIGRAM(S): at 05:13

## 2021-01-01 RX ADMIN — OXCARBAZEPINE 300 MILLIGRAM(S): 300 TABLET, FILM COATED ORAL at 16:50

## 2021-01-01 RX ADMIN — Medication 5 MILLIGRAM(S): at 14:38

## 2021-01-01 RX ADMIN — HEPARIN SODIUM 5000 UNIT(S): 5000 INJECTION INTRAVENOUS; SUBCUTANEOUS at 21:36

## 2021-01-01 RX ADMIN — CEFTRIAXONE 100 MILLIGRAM(S): 500 INJECTION, POWDER, FOR SOLUTION INTRAMUSCULAR; INTRAVENOUS at 22:53

## 2021-01-01 RX ADMIN — SODIUM CHLORIDE 50 MILLILITER(S): 9 INJECTION, SOLUTION INTRAVENOUS at 12:31

## 2021-01-01 RX ADMIN — CARVEDILOL PHOSPHATE 3.12 MILLIGRAM(S): 80 CAPSULE, EXTENDED RELEASE ORAL at 16:50

## 2021-01-01 RX ADMIN — HEPARIN SODIUM 5000 UNIT(S): 5000 INJECTION INTRAVENOUS; SUBCUTANEOUS at 04:34

## 2021-01-08 PROBLEM — R30.0 DYSURIA: Status: ACTIVE | Noted: 2021-01-01

## 2021-01-11 PROBLEM — R80.9 PROTEINURIA: Status: ACTIVE | Noted: 2021-01-01

## 2021-01-20 NOTE — ED ADULT NURSE NOTE - CHIEF COMPLAINT QUOTE
Pt brought in by EMS from home, tested + for coivd today. Pt brought in for SOB, spO2 on RA was 84%, pt placed on Nonrebreather 97%. Pt is non verbal at baseline. Pts wife# [phone] daughter Heather [phone]

## 2021-01-20 NOTE — ED PROVIDER NOTE - PHYSICAL EXAMINATION
General: patient non verbal, does not respond to questions, warm to touch   Skin: normal color for race, no rash  Head: normocephalic, atraumatic  Eyes: clear conjunctiva, PERRL, pupils 2mm   ENMT: airway patent, no nasal discharge  Cardiovascular: normal rate, normal rhythm, S1/S2  Pulmonary: poor inspiratory effort, crackles, hypoxic on RA, 97% on 15L NRB   Abdomen: soft, no guarding   Musculoskeletal: patient not moving extremities, no deformity   Psych: resting comfortably in bed, calm

## 2021-01-20 NOTE — ED ADULT TRIAGE NOTE - CHIEF COMPLAINT QUOTE
Pt brought in by EMS from home, tested + for coivd today. Pt brought in for SOB, spO2 on RA was 84%, pt placed on Nonrebreather 97%. Pt is non verbal at baseline. Pts wife# [phone] Pt brought in by EMS from home, tested + for coivd today. Pt brought in for SOB, spO2 on RA was 84%, pt placed on Nonrebreather 97%. Pt is non verbal at baseline. Pts wife# [phone] daughter Heather [phone]

## 2021-01-20 NOTE — ED PROVIDER NOTE - NS ED ROS FT
ROS Unobtainable due to patient's baseline mentals status  +COVID -19  +decreased level of alertness

## 2021-01-20 NOTE — ED ADULT NURSE NOTE - OBJECTIVE STATEMENT
RN Facilitator: Pt received to room 23. Pt comes to ED from home after testing positive for covid and was found to be 84% on room air. As per MD note, pt's wife has found pt to be "less responsive" over the past few days. Pt is non-verbal at baseline, hx of HTN, Parkinson's, pacemaker. Respirations are slightly tachypneic, crackles heard in lungs bilat, diminished at bases. Pt straight cathed for urine using sterile technique with 300ml of jose, cloudy urine drained from bladder. Nonblanchable redness noted to inner buttock. Report given to primary RN Izabel Hanks.

## 2021-01-20 NOTE — ED ADULT NURSE NOTE - NSIMPLEMENTINTERV_GEN_ALL_ED
Implemented All Fall Risk Interventions:  [place] to call system. Call bell, personal items and telephone within reach. Instruct patient to call for assistance. Room bathroom lighting operational. Non-slip footwear when patient is off stretcher. Physically safe environment: no spills, clutter or unnecessary equipment. Stretcher in lowest position, wheels locked, appropriate side rails in place. Provide visual cue, wrist band, yellow gown, etc. Monitor gait and stability. Monitor for mental status changes and reorient to person, place, and time. Review medications for side effects contributing to fall risk. Reinforce activity limits and safety measures with patient and family.

## 2021-01-20 NOTE — ED PROVIDER NOTE - ATTENDING CONTRIBUTION TO CARE
DR. BURROUGHS, ATTENDING MD-  I performed a face to face bedside interview with the patient regarding history of present illness, review of symptoms and past medical history. I completed an independent physical exam.  I have discussed the patient's plan of care with the resident.   Documentation as above in the note.    79 y/o male h/o htn, dnr/dni bibems for covid pos today, noted by wife who is covid pos to be more sob and lethargic than usual.  Likely covid pna.  Obtain cbc cmp rvp with covid ua ucx, hypoxic here requiring o2, will admit for further care and evaluation.

## 2021-01-20 NOTE — ED PROVIDER NOTE - OBJECTIVE STATEMENT
80 year old male 80 year old male with a pmhx of HTN, Parkinsons, pacemaker, gastric bypass, is sent to ED from home for evaluation of decreased level of alertness in setting of known COVID-19 positive. Wife reports patient to be nonverbal at baseline. She tested positive last week, patient reportedly tested negative. She states today he had a repeat test done at the house and tested positive. She notes decreased level of responsiveness in the past few days. Patient is unable to provide hx and this is reportedly his baseline. He has no known allergies. Wife states patient is DNR and DNI and would not want to have CPR or intubation.

## 2021-01-20 NOTE — ED PROVIDER NOTE - CLINICAL SUMMARY MEDICAL DECISION MAKING FREE TEXT BOX
80 year old male with pmhx of HTN, Parkinson, COVID-19 positive, coming from home in setting of decrease level of responsiveness. Pt found to be hypoxic on arrival. Placed on NRB, now O2 sat 97%. Will obtain labs, COVID, cxr, EKG. Pt to be admitted for hypoxic respiratory failure. Decadron given

## 2021-01-21 NOTE — H&P ADULT - HISTORY OF PRESENT ILLNESS
History limited 2/2 to dementia, obtained from Wife Concha     80 year old male with a pmhx of HTN, Parkinsons, s/p pacemaker, dysphagia, BPH, seizures? is sent to ED from home for evaluation of SOB and decreased level of alertness in setting of known COVID-19 positive. Wife reports patient to be nonverbal and somnolent at baseline but over the past few days noted to be less responsive than usual. She also noted that appears to be more short of breath. She denies any asymmetry of the face or focal weakness of the extremities. She tested positive last week, patient reportedly tested negative. She states today he had a repeat test done at the house and tested positive. He also was recently treated for a UTI with bactrim and completed the full course of antibiotics. Wife states patient is DNR and DNI and would not want to have CPR or intubation. He is no longer on carbidopa levodopa as he did not tolerate the medication.

## 2021-01-21 NOTE — H&P ADULT - PROBLEM SELECTOR PLAN 1
-Pt p/w leukocytosis, tachypneic, low grade fever  -CXR with bilateral patchy opacities likely 2/2 COVID infection  -UA noted to be positive in the setting of recent abx course. Pt a poor historian so unable elict symptoms. Unclear if this represents persistent infection   -Favor treating with CTX pending repeat urine cx.  -may also consider broadening to zosyn if pt's resp status continues to deteriorate, must consider aspiration pna in the setting of known dysphagia  -check sputum cx  -f/u blood and urine cx

## 2021-01-21 NOTE — H&P ADULT - PROBLEM SELECTOR PLAN 3
-likely 2/2 to infection  -no focal weakness noted by wife. Mental status at present does not appear far from what wife reported as baseline  -treat infection as above  -will hold off on CT head for now. Can consider if mental status does not improve

## 2021-01-21 NOTE — H&P ADULT - ASSESSMENT
10 year old male with a pmhx of HTN, Parkinsons, s/p pacemaker, dysphagia, BPH, seizures? p/w hypoxia and likely metabolic encephalopathy 2/2 to COVID infection with concern for persistent UTI 80 year old male with a pmhx of HTN, Parkinsons, s/p pacemaker, dysphagia, BPH, seizures? p/w hypoxia and likely metabolic encephalopathy 2/2 to COVID infection with concern for persistent UTI

## 2021-01-21 NOTE — H&P ADULT - NSHPPHYSICALEXAM_GEN_ALL_CORE
GENERAL APPEARANCE: Well developed, well nourished, awake but does not follow commands, NAD.   HEENT:  PERRL, EOMI. External auditory canals normal  NECK: Neck supple, non-tender without lymphadenopathy, masses or thyromegaly.  CARDIAC: Normal S1 and S2. No S3, S4 or murmurs. Rhythm is regular.  LUNGS: Tachypneic but Clear to auscultation and percussion without rales, rhonchi, wheezing or diminished breath sounds anteriorly  ABDOMEN: Positive bowel sounds. Soft, nondistended, nontender. No guarding or rebound.   MUSCULOSKELETAL: No joint erythema or tenderness.   EXTREMITIES: No edema. Peripheral pulses intact. No varicosities.  NEUROLOGICAL: Lead pipe rigidity in upper extremities. Unable to perform full neuro but withdraws to painful stimuli   SKIN: eschar on right hand, scattered purpura on skin  PSYCHIATRIC: AOx0

## 2021-01-21 NOTE — H&P ADULT - NSHPLABSRESULTS_GEN_ALL_CORE
( @ 22:40)                      13.1  12.42 )-----------( 321                 43.3    Neutrophils = 10.97 (80.3%)  Lymphocytes = 0.22 (1.8%)  Eosinophils = 0.00 (0.0%)  Basophils = 0.00 (0.0%)  Monocytes = 0.56 (4.5%)  Bands = 8.0%        145  |  109<H>  |  49<H>  ----------------------------<  143<H>  5.1   |  24  |  1.64<H>    Ca    8.8      2021 22:40    TPro  7.2  /  Alb  2.8<L>  /  TBili  0.5  /  DBili  x   /  AST  37  /  ALT  23  /  AlkPhos  100      ( 2021 22:40 )   PT: 14.5 sec;   INR: 1.28 ratio;       Venous Blood Gas:   @ 22:40  7.41/43/73/26/93.6  VBG Lactate: 1.6        Urinalysis Basic - ( 2021 22:40 )    Color: Yellow / Appearance: Slightly Turbid / S.031 / pH: x  Gluc: x / Ketone: Negative  / Bili: Negative / Urobili: 3 mg/dL   Blood: x / Protein: 30 mg/dL / Nitrite: Negative   Leuk Esterase: Large / RBC: 0-2 /HPF / WBC >50 /HPF   Sq Epi: x / Non Sq Epi: Few / Bacteria: Moderate      < from: Xray Chest 1 View- PORTABLE-Urgent (21 @ 21:10) >      INTERPRETATION:  Patchy bilateral lower lung opacities, right greater than left, in keeping with known COVID infection.  Evaluation of the lung apices is limited due to patient's positioning.    < end of copied text >      Labs and imaging reviewed

## 2021-01-21 NOTE — H&P ADULT - NSICDXPASTMEDICALHX_GEN_ALL_CORE_FT
PAST MEDICAL HISTORY:  History of BPH     Hypertension, unspecified type     Pacemaker     Parkinson disease

## 2021-01-21 NOTE — H&P ADULT - PROBLEM SELECTOR PLAN 2
-Pt hypoxic to 84% on RA. Currently on 4L NC  -likely 2/2 to covid infection vs less likely aspiration pna  -D dimer noted however pt not tachycardia, will hold off on CT angio to eval for PE for now nafisa in the setting of SUSAN  -Discussed remdesivir with wife. Given SUSAN, will hold off on starting for now. Can consider if renal function improves  -Wean off oxygen as tolerated

## 2021-01-21 NOTE — H&P ADULT - PROBLEM SELECTOR PLAN 5
-unknown baseline Cr however potassium mildly elevated so concern for some SUSAN  -likely pre-renal as pt with poor po intake 2/2 to mental status  -will start on gentle maintenance fluids @ 50 with careful monitoring of resp status. Please discontinue if pt with increased oxygen requirements   -trend BMP

## 2021-01-25 NOTE — PROGRESS NOTE ADULT - ASSESSMENT
Patient is a 80 year old male with a PMHx of Parkinson's disease, HTN, BPH, dysphagia, status post pacemaker who's here with acute hypoxic respiratory failure due to COVID-19:

## 2021-01-27 NOTE — SWALLOW BEDSIDE ASSESSMENT ADULT - SWALLOW EVAL: RECOMMENDED DIET
Continue with puree and honey-thick liquids with appropriate use of feeding strategies of: 1. Sitting upright at 90 degrees. 2. Alternate liquids/solids. 3. Small sips/bites.

## 2021-01-27 NOTE — DIETITIAN INITIAL EVALUATION ADULT. - OTHER INFO
Unable to conduct a face to face interview or nutrition-focused physical exam due to limited contact restrictions related to Pt's medical condition and isolation precautions. Collateral obtained from chart review. Unable to assess PO intake although it is reported in flow sheets that nutrition is very poor. No GI distress (nausea/vomiting/diarrhea/constipation) per chart. 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  Pt's current weight is 51.9 kg. As per [facility], Pt 's weight on 10/21/20 was 58.1 kg. Pt had a 6.2 kg weight loss in 3 months.   Pt has a history of dysphagia. He had a Speech and Swallow evaluation on 1/27/21. It was recommended that Pt continue with a pureed diet with honey thick liquids. It is reported that Pt is not eating anything. Consider alternate means of nutrition if consistent with family wishes.

## 2021-01-27 NOTE — SWALLOW BEDSIDE ASSESSMENT ADULT - SWALLOW EVAL: DIAGNOSIS
1. Patient demonstrated moderate oral dysphagia with puree and honey-thick liquids characterized by reduced stripping of bolus from utensil, inconsistent bolus holding, reduced A-P transport and oral residue. Residue cleared with use of liquid wash. Patient benefited from verbal cues to swallow. 2. Patient demonstrated mild-moderate pharyngeal dysphagia with puree and honey-thick liquids characterized by suspected delay in pharyngeal swallow trigger and present hyolaryngeal elevation upon digital palpation. No overt signs or symptoms of aspiration/penetration noted.

## 2021-01-27 NOTE — SWALLOW BEDSIDE ASSESSMENT ADULT - ORAL PHASE
Decreased anterior-posterior movement of the bolus/Delayed oral transit time/Stasis in lateral sulci

## 2021-01-27 NOTE — CHART NOTE - NSCHARTNOTEFT_GEN_A_CORE
I've reviewed the CXR. I think there is new RLL opacities likely to be a PNA.  I recommend to get BCx first, then can start Zosyn 3.375g q8h tonight.  Formal ID consult will evaluate the patient in the morning.    Beryl Jin MD, PGY4   ID fellow  Pager: [phone]  After 5pm/weekends call [phone]

## 2021-01-28 NOTE — CONSULT NOTE ADULT - SUBJECTIVE AND OBJECTIVE BOX
Patient is a 80y old  Male who presents with a chief complaint of SOB and decrease in level of alertness in setting of COVID + (27 Jan 2021 14:23)    HPI:  History limited 2/2 to dementia, obtained from Wife Concha     81 y/o M PMHx of Parkinsons, dementia (reported baseline nonverbal and somewhat somnolent), dysphagia, BPH was sent in for worsening SOB and decreased alertness in the s/o outpatient COVID positive test. Reportedly patients wife had tested positive the week prior, patient reportedly negative at the time. Pt tested positive outpatient day of admission. Also reports of recently completing Bactrim for UTI as outpatient.    In the hospital pt afebrile (tmax 100.1), persistently SPO2 mid to high 90s requiring 4-5L NC. Initial leukocytosis 12.42 ( with 8% bands), SUSAN, UA with significant pyuria but no Ucx sent. Bcx from admission negative. CXR c/f COVID pneumonia.    Remdesivir was held due to SUSAN and CTX started for possible UTI. Pts oxygen status has been largely stable, however yesterday WBC increased from 15.08 to 33.36. Repeat CXR c/f RLL opacity, pt escalated to Zosyn and repeat Bcx drawn.     prior hospital charts reviewed [ x ]  primary team notes reviewed [ x ]  other consultant notes reviewed [x  ]  PAST MEDICAL & SURGICAL HISTORY:  Pacemaker    History of BPH    Parkinson disease    Hypertension, unspecified type    S/P cholecystectomy    H/O gastric bypass      Allergies  No Known Allergies    ANTIMICROBIALS (past 90 days)  MEDICATIONS  (STANDING):  cefTRIAXone   IVPB   100 mL/Hr IV Intermittent (01-21-21 @ 00:57)    cefTRIAXone   IVPB   100 mL/Hr IV Intermittent (01-27-21 @ 00:43)   100 mL/Hr IV Intermittent (01-26-21 @ 00:48)   100 mL/Hr IV Intermittent (01-25-21 @ 00:43)   100 mL/Hr IV Intermittent (01-24-21 @ 01:13)   100 mL/Hr IV Intermittent (01-22-21 @ 22:53)   100 mL/Hr IV Intermittent (01-22-21 @ 06:15)    piperacillin/tazobactam IVPB.   200 mL/Hr IV Intermittent (01-28-21 @ 09:15)      ANTIMICROBIALS:    piperacillin/tazobactam IVPB.. 3.375 every 8 hours    OTHER MEDS: MEDICATIONS  (STANDING):  acetaminophen  Suppository .. 650 every 4 hours PRN  carvedilol 3.125 every 12 hours  dexAMETHasone  Injectable 6 daily  escitalopram 20 daily  finasteride 5 daily  heparin   Injectable 5000 every 8 hours  OXcarbazepine 300 two times a day  pantoprazole    Tablet 40 before breakfast  tamsulosin 0.4 at bedtime    SOCIAL HISTORY:  unable to obtain due to mental status    FAMILY HISTORY:  unable to obtain due to mental status      REVIEW OF SYSTEMS  [ x ] ROS unobtainable because:  unable to obtain due to mental status  [  ] All other systems negative except as noted below:	    Constitutional:  [ ] fever [ ] chills  [ ] weight loss  [ ] weakness  Skin:  [ ] rash [ ] phlebitis	  Eyes: [ ] icterus [ ] pain  [ ] discharge	  ENMT: [ ] sore throat  [ ] thrush [ ] ulcers [ ] exudates  Respiratory: [ ] dyspnea [ ] hemoptysis [ ] cough [ ] sputum	  Cardiovascular:  [ ] chest pain [ ] palpitations [ ] edema	  Gastrointestinal:  [ ] nausea [ ] vomiting [ ] diarrhea [ ] constipation [ ] pain	  Genitourinary:  [ ] dysuria [ ] frequency [ ] hematuria [ ] discharge [ ] flank pain  [ ] incontinence  Musculoskeletal:  [ ] myalgias [ ] arthralgias [ ] arthritis  [ ] back pain  Neurological:  [ ] headache [ ] seizures  [ ] confusion/altered mental status  Psychiatric:  [ ] anxiety [ ] depression	  Hematology/Lymphatics:  [ ] lymphadenopathy  Endocrine:  [ ] adrenal [ ] thyroid  Allergic/Immunologic:	 [ ] transplant [ ] seasonal    Vital Signs Last 24 Hrs  T(F): 98.3 (01-28-21 @ 05:37), Max: 99 (01-22-21 @ 12:46)  Vital Signs Last 24 Hrs  HR: 88 (01-28-21 @ 05:37) (84 - 88)  BP: 115/74 (01-28-21 @ 05:37) (115/74 - 150/82)  RR: 20 (01-28-21 @ 05:37)  SpO2: 94% (01-28-21 @ 05:37) (94% - 95%)  Wt(kg): --    PHYSICAL EXAM:  Constitutional: non-toxic, no distress  HEAD/EYES: anicteric, no conjunctival injection  ENT:  supple, no thrush  Cardiovascular:   normal S1, S2, no murmur, no edema  Respiratory:  clear BS bilaterally, no wheezes, no rales  GI:  soft, non-tender, normal bowel sounds  :  no gallardo, no CVA tenderness  Musculoskeletal:  no synovitis, normal ROM  Neurologic: awake and alert, normal strength, no focal findings  Skin:  no rash, no erythema, no phlebitis  Heme/Onc: no lymphadenopathy   Psychiatric:  awake, alert, appropriate mood                            14.2   33.52 )-----------( 271      ( 28 Jan 2021 08:12 )             47.8     01-28    158<H>  |  119<H>  |  48<H>  ----------------------------<  129<H>  3.9   |  26  |  0.98    Ca    9.2      28 Jan 2021 08:12  Phos  2.3     01-28  Mg     2.4     01-28      MICROBIOLOGY:    Culture - Blood (01.20.21 @ 22:10)   Specimen Source: .Blood Blood-Peripheral   Culture Results:   No Growth Final     Urinalysis + Microscopic Examination (01.20.21 @ 22:40)   Glucose Qualitative, Urine: Negative   Blood, Urine: Negative   pH Urine: 5.5   Leukocyte Esterase Concentration: Large   Color: Yellow   Urine Appearance: Slightly Turbid   Urobilinogen: 3 mg/dL   Specific Gravity: 1.031   Protein, Urine: 30 mg/dL   Nitrite: Negative   Ketone - Urine: Negative   Bilirubin: Negative   Red Blood Cell - Urine: 0-2 /HPF   White Blood Cell - Urine: >50 /HPF   Epithelial Cells: Few   Bacteria: Moderate COVID-19 PCR: Detected: You can help in the fight against COVID-19. [facility] may contact           RADIOLOGY:  < from: Xray Chest 1 View- PORTABLE-Urgent (Xray Chest 1 View- PORTABLE-Urgent .) (01.26.21 @ 19:22) >    Impression: Suspect evolving infiltrate (pneumonia) right base.      < end of copied text >  < from: Xray Chest 1 View- PORTABLE-Urgent (01.20.21 @ 21:10) >    IMPRESSION: Bilateral opacities may represent mild covid 19 pneumonia.    < end of copied text >       Patient is a 80y old  Male who presents with a chief complaint of SOB and decrease in level of alertness in setting of COVID + (27 Jan 2021 14:23)    HPI:  History limited 2/2 to dementia, obtained from Wife Concha     81 y/o M PMHx of Parkinsons, dementia (reported baseline nonverbal and somewhat somnolent), dysphagia, BPH was sent in for worsening SOB and decreased alertness in the s/o outpatient COVID positive test. Reportedly patients wife had tested positive the week prior, patient reportedly negative at the time. Pt tested positive outpatient day of admission. Also reports of recently completing Bactrim for UTI as outpatient.    In the hospital pt afebrile (tmax 100.1), persistently SPO2 mid to high 90s requiring 4-5L NC. Initial leukocytosis 12.42 ( with 8% bands), SUSAN, UA with significant pyuria but no Ucx sent. Bcx from admission negative. CXR c/f COVID pneumonia.    Remdesivir was held due to SUSAN and CTX started for possible UTI. Pts oxygen status has been largely stable, however yesterday WBC increased from 15.08 to 33.36. Repeat CXR c/f RLL opacity, pt escalated to Zosyn and repeat Bcx drawn.     prior hospital charts reviewed [ x ]  primary team notes reviewed [ x ]  other consultant notes reviewed [x  ]  PAST MEDICAL & SURGICAL HISTORY:  Pacemaker    History of BPH    Parkinson disease    Hypertension, unspecified type    S/P cholecystectomy    H/O gastric bypass      Allergies  No Known Allergies    ANTIMICROBIALS (past 90 days)  MEDICATIONS  (STANDING):  cefTRIAXone   IVPB   100 mL/Hr IV Intermittent (01-21-21 @ 00:57)    cefTRIAXone   IVPB   100 mL/Hr IV Intermittent (01-27-21 @ 00:43)   100 mL/Hr IV Intermittent (01-26-21 @ 00:48)   100 mL/Hr IV Intermittent (01-25-21 @ 00:43)   100 mL/Hr IV Intermittent (01-24-21 @ 01:13)   100 mL/Hr IV Intermittent (01-22-21 @ 22:53)   100 mL/Hr IV Intermittent (01-22-21 @ 06:15)    piperacillin/tazobactam IVPB.   200 mL/Hr IV Intermittent (01-28-21 @ 09:15)      ANTIMICROBIALS:    piperacillin/tazobactam IVPB.. 3.375 every 8 hours    OTHER MEDS: MEDICATIONS  (STANDING):  acetaminophen  Suppository .. 650 every 4 hours PRN  carvedilol 3.125 every 12 hours  dexAMETHasone  Injectable 6 daily  escitalopram 20 daily  finasteride 5 daily  heparin   Injectable 5000 every 8 hours  OXcarbazepine 300 two times a day  pantoprazole    Tablet 40 before breakfast  tamsulosin 0.4 at bedtime    SOCIAL HISTORY:  unable to obtain due to mental status    FAMILY HISTORY:  unable to obtain due to mental status      REVIEW OF SYSTEMS  [ x ] ROS unobtainable because:  unable to obtain due to mental status  [  ] All other systems negative except as noted below:	    Constitutional:  [ ] fever [ ] chills  [ ] weight loss  [ ] weakness  Skin:  [ ] rash [ ] phlebitis	  Eyes: [ ] icterus [ ] pain  [ ] discharge	  ENMT: [ ] sore throat  [ ] thrush [ ] ulcers [ ] exudates  Respiratory: [ ] dyspnea [ ] hemoptysis [ ] cough [ ] sputum	  Cardiovascular:  [ ] chest pain [ ] palpitations [ ] edema	  Gastrointestinal:  [ ] nausea [ ] vomiting [ ] diarrhea [ ] constipation [ ] pain	  Genitourinary:  [ ] dysuria [ ] frequency [ ] hematuria [ ] discharge [ ] flank pain  [ ] incontinence  Musculoskeletal:  [ ] myalgias [ ] arthralgias [ ] arthritis  [ ] back pain  Neurological:  [ ] headache [ ] seizures  [ ] confusion/altered mental status  Psychiatric:  [ ] anxiety [ ] depression	  Hematology/Lymphatics:  [ ] lymphadenopathy  Endocrine:  [ ] adrenal [ ] thyroid  Allergic/Immunologic:	 [ ] transplant [ ] seasonal    Vital Signs Last 24 Hrs  T(F): 98.3 (01-28-21 @ 05:37), Max: 99 (01-22-21 @ 12:46)  Vital Signs Last 24 Hrs  HR: 88 (01-28-21 @ 05:37) (84 - 88)  BP: 115/74 (01-28-21 @ 05:37) (115/74 - 150/82)  RR: 20 (01-28-21 @ 05:37)  SpO2: 94% (01-28-21 @ 05:37) (94% - 95%)  Wt(kg): --    PHYSICAL EXAM:  Constitutional: non-toxic, alert not somnolent but does not respond to interviewed, not tachypneic  HEAD/EYES: anicteric, no conjunctival injection  ENT:  supple, no thrush  Cardiovascular:   normal S1, S2, no murmur, no edema  Respiratory:  course breath sounds bilaterally  GI:  soft, non-tender, normal bowel sounds  :  no gallardo  Musculoskeletal:  no synovitis, normal ROM  Neurologic: awake but not responsive  Skin:  no rash, no erythema, no phlebitis  Heme/Onc: no lymphadenopathy   Psychiatric:  awake but not responsive                            14.2   33.52 )-----------( 271      ( 28 Jan 2021 08:12 )             47.8     01-28    158<H>  |  119<H>  |  48<H>  ----------------------------<  129<H>  3.9   |  26  |  0.98    Ca    9.2      28 Jan 2021 08:12  Phos  2.3     01-28  Mg     2.4     01-28      MICROBIOLOGY:    Culture - Blood (01.20.21 @ 22:10)   Specimen Source: .Blood Blood-Peripheral   Culture Results:   No Growth Final     Urinalysis + Microscopic Examination (01.20.21 @ 22:40)   Glucose Qualitative, Urine: Negative   Blood, Urine: Negative   pH Urine: 5.5   Leukocyte Esterase Concentration: Large   Color: Yellow   Urine Appearance: Slightly Turbid   Urobilinogen: 3 mg/dL   Specific Gravity: 1.031   Protein, Urine: 30 mg/dL   Nitrite: Negative   Ketone - Urine: Negative   Bilirubin: Negative   Red Blood Cell - Urine: 0-2 /HPF   White Blood Cell - Urine: >50 /HPF   Epithelial Cells: Few   Bacteria: Moderate COVID-19 PCR: Detected: You can help in the fight against COVID-19. [facility] may contact           RADIOLOGY:  < from: Xray Chest 1 View- PORTABLE-Urgent (Xray Chest 1 View- PORTABLE-Urgent .) (01.26.21 @ 19:22) >    Impression: Suspect evolving infiltrate (pneumonia) right base.      < end of copied text >  < from: Xray Chest 1 View- PORTABLE-Urgent (01.20.21 @ 21:10) >    IMPRESSION: Bilateral opacities may represent mild covid 19 pneumonia.    < end of copied text >       Patient is a 80y old  Male who presents with a chief complaint of SOB and decrease in level of alertness in setting of COVID + (27 Jan 2021 14:23)    HPI:  History limited 2/2 to dementia, obtained from Wife Concha     81 y/o M PMHx of Parkinsons, dementia (reported baseline nonverbal and somewhat somnolent), dysphagia, BPH was sent in for worsening SOB and decreased alertness in the s/o outpatient COVID positive test. Reportedly patients wife had tested positive the week prior, patient reportedly negative at the time. Pt tested positive outpatient day of admission. Also reports of recently completing Bactrim for UTI as outpatient.    In the hospital pt afebrile (tmax 100.1), persistently SPO2 mid to high 90s requiring 4-5L NC. Initial leukocytosis 12.42 ( with 8% bands), SUSAN, UA with significant pyuria but no Ucx sent. Bcx from admission negative. CXR c/f COVID pneumonia.    Remdesivir was held due to SUSAN and CTX started for possible UTI. Pts oxygen status has been largely stable, however yesterday WBC increased from 15.08 to 33.36. Repeat CXR c/f RLL opacity, pt escalated to Zosyn and repeat Bcx drawn.     prior hospital charts reviewed [ x ]  primary team notes reviewed [ x ]  other consultant notes reviewed [x  ]  PAST MEDICAL & SURGICAL HISTORY:  Pacemaker    History of BPH    Parkinson disease    Hypertension, unspecified type    S/P cholecystectomy    H/O gastric bypass      Allergies  No Known Allergies    ANTIMICROBIALS (past 90 days)  MEDICATIONS  (STANDING):  cefTRIAXone   IVPB   100 mL/Hr IV Intermittent (01-21-21 @ 00:57)    cefTRIAXone   IVPB   100 mL/Hr IV Intermittent (01-27-21 @ 00:43)   100 mL/Hr IV Intermittent (01-26-21 @ 00:48)   100 mL/Hr IV Intermittent (01-25-21 @ 00:43)   100 mL/Hr IV Intermittent (01-24-21 @ 01:13)   100 mL/Hr IV Intermittent (01-22-21 @ 22:53)   100 mL/Hr IV Intermittent (01-22-21 @ 06:15)    piperacillin/tazobactam IVPB.   200 mL/Hr IV Intermittent (01-28-21 @ 09:15)      ANTIMICROBIALS:    piperacillin/tazobactam IVPB.. 3.375 every 8 hours    OTHER MEDS: MEDICATIONS  (STANDING):  acetaminophen  Suppository .. 650 every 4 hours PRN  carvedilol 3.125 every 12 hours  dexAMETHasone  Injectable 6 daily  escitalopram 20 daily  finasteride 5 daily  heparin   Injectable 5000 every 8 hours  OXcarbazepine 300 two times a day  pantoprazole    Tablet 40 before breakfast  tamsulosin 0.4 at bedtime    SOCIAL HISTORY:  unable to obtain due to mental status    FAMILY HISTORY:  unable to obtain due to mental status      REVIEW OF SYSTEMS  [ x ] ROS unobtainable because:  unable to obtain due to mental status  [  ] All other systems negative except as noted below:	    Constitutional:  [ ] fever [ ] chills  [ ] weight loss  [ ] weakness  Skin:  [ ] rash [ ] phlebitis	  Eyes: [ ] icterus [ ] pain  [ ] discharge	  ENMT: [ ] sore throat  [ ] thrush [ ] ulcers [ ] exudates  Respiratory: [ ] dyspnea [ ] hemoptysis [ ] cough [ ] sputum	  Cardiovascular:  [ ] chest pain [ ] palpitations [ ] edema	  Gastrointestinal:  [ ] nausea [ ] vomiting [ ] diarrhea [ ] constipation [ ] pain	  Genitourinary:  [ ] dysuria [ ] frequency [ ] hematuria [ ] discharge [ ] flank pain  [ ] incontinence  Musculoskeletal:  [ ] myalgias [ ] arthralgias [ ] arthritis  [ ] back pain  Neurological:  [ ] headache [ ] seizures  [ ] confusion/altered mental status  Psychiatric:  [ ] anxiety [ ] depression	  Hematology/Lymphatics:  [ ] lymphadenopathy  Endocrine:  [ ] adrenal [ ] thyroid  Allergic/Immunologic:	 [ ] transplant [ ] seasonal    Vital Signs Last 24 Hrs  T(F): 98.3 (01-28-21 @ 05:37), Max: 99 (01-22-21 @ 12:46)  Vital Signs Last 24 Hrs  HR: 88 (01-28-21 @ 05:37) (84 - 88)  BP: 115/74 (01-28-21 @ 05:37) (115/74 - 150/82)  RR: 20 (01-28-21 @ 05:37)  SpO2: 94% (01-28-21 @ 05:37) (94% - 95%)  Wt(kg): --    PHYSICAL EXAM:  Constitutional: non-toxic, alert not somnolent but does not respond to interviewed, not tachypneic  HEAD/EYES: anicteric, no conjunctival injection  ENT:  supple, no thrush  Cardiovascular:   normal S1, S2, no murmur, no edema  Respiratory:  course breath sounds bilaterally  GI:  soft, non-tender, normal bowel sounds  :  no gallardo  Musculoskeletal:  no synovitis, normal ROM  Neurologic: awake but not responsive  Skin:  no rash, no erythema, no phlebitis  Heme/Onc: no lymphadenopathy   Psychiatric:  awake but not responsive                            14.2   33.52 )-----------( 271      ( 28 Jan 2021 08:12 )             47.8     01-28    158<H>  |  119<H>  |  48<H>  ----------------------------<  129<H>  3.9   |  26  |  0.98    Ca    9.2      28 Jan 2021 08:12  Phos  2.3     01-28  Mg     2.4     01-28      MICROBIOLOGY:    Culture - Blood (01.20.21 @ 22:10)   Specimen Source: .Blood Blood-Peripheral   Culture Results:   No Growth Final     Urinalysis + Microscopic Examination (01.20.21 @ 22:40)   Glucose Qualitative, Urine: Negative   Blood, Urine: Negative   pH Urine: 5.5   Leukocyte Esterase Concentration: Large   Color: Yellow   Urine Appearance: Slightly Turbid   Urobilinogen: 3 mg/dL   Specific Gravity: 1.031   Protein, Urine: 30 mg/dL   Nitrite: Negative   Ketone - Urine: Negative   Bilirubin: Negative   Red Blood Cell - Urine: 0-2 /HPF   White Blood Cell - Urine: >50 /HPF   Epithelial Cells: Few   Bacteria: Moderate COVID-19 PCR: Detected: You can help in the fight against COVID-19. [facility] may contact     RADIOLOGY:  < from: Xray Chest 1 View- PORTABLE-Urgent (Xray Chest 1 View- PORTABLE-Urgent .) (01.26.21 @ 19:22) >    Impression: Suspect evolving infiltrate (pneumonia) right base.      < end of copied text >  < from: Xray Chest 1 View- PORTABLE-Urgent (01.20.21 @ 21:10) >    IMPRESSION: Bilateral opacities may represent mild covid 19 pneumonia.    < end of copied text >

## 2021-01-29 NOTE — PROGRESS NOTE ADULT - ASSESSMENT
80 year old male with Parkinson's dementia, dysphagia, BPH with COVID, on NRB. CXR with RLL PNA. Completed ceftriaxone 1/21 - 1/27 for possible UTI. Now with  increase in his leukocytosis possible aspiratoin pna given CXR findings.     Recommend:  #COVID PNA  -Continue dexamethasone  -F/U blood cultures  -Remdesivir on hold due to SUSAN, which has improved.  -Trend inflammatory markers, d-dimer and pct  -UA with low WBC, points away from UTI, would monitor for now    #Aspiration PNA  -Continue zosyn   -Aspiration precautions    #Leukocytosis  -Improving  -Continue abx  -Trend to normal    #  Sy Leggett MD  Pager [phone]  After 5pm/weekends call [phone] 80 year old male with Parkinson's dementia, dysphagia, BPH with COVID, on NRB. CXR with RLL PNA. Completed ceftriaxone 1/21 - 1/27 for possible UTI. Now with  increase in his leukocytosis possible aspiratoin pna given CXR findings.     Recommend:  #COVID PNA  -Continue dexamethasone  -F/U blood cultures  -Remdesivir on hold due to SUSAN, which has improved.  -Trend inflammatory markers, d-dimer and pct  -UA with low WBC, points away from UTI, would monitor for now    #Aspiration PNA  -Continue zosyn   -Aspiration precautions    #Leukocytosis  -Improving  -Continue abx  -Trend to normal    Sy Leggett MD  Pager [phone]  After 5pm/weekends call [phone]    Discussed with primary team.

## 2021-01-30 NOTE — PROGRESS NOTE ADULT - ASSESSMENT
Patient is a 80 year old male with a PMHx of Parkinson's disease, HTN, BPH, dysphagia, status post pacemaker who's here with acute hypoxic respiratory failure due to COVID-19

## 2021-01-31 NOTE — SWALLOW BEDSIDE ASSESSMENT ADULT - COMMENTS
Consult for bedside swallow evaluation received. Upon chart review and discussion with MD, patient receiving supplemental oxygen via nonrebreather mask. Given increased O2 requirement, patient not appropriate for PO trials at this time. Please reconsult this service when patient is able to tolerate room air or nasal cannula to reduce risk of aspiration and subsequent respiratory decline. Discussed with MD.
H&P 1/21/2021: 80 year old male with a pmhx of HTN, Parkinsons, s/p pacemaker, dysphagia, BPH, seizures? p/w hypoxia and likely metabolic encephalopathy 2/2 to COVID infection with concern for persistent UTI.    CXR 1/25/2021: Bilateral opacities may represent mild covid 19 pneumonia.    Consult received and chart reviewed. Patient seen at bedside for clinical swallow evaluation; patient alert and oriented x 0 and nonverbal but awake with eyes open. Patient unable to follow directions or make wants/needs known. Patient receiving oxygen via nasal cannula at 5L/min. Per RN, patient with some oral residue after consuming crushed medication with applesauce.    Results and recommendations discussed with RN on unit. Called out to team.

## 2021-02-01 NOTE — CONSULT NOTE ADULT - LOCATION OF DISCUSSION
Patient Name: Kerry Edgar  Attending MD: Dc Noyola MD  MRN: [id number]  YOB: 1975  Account Number: [id number]  Age: 42  Admit Type: Outpatient  Gender: Female  Instrument Name: CRESENCIO YOUNG 3232  Procedure Date No Time: 4/10/2017  Grafts or Implants: Grafts or Implants Not Applicable  Procedure:            Colonoscopy  Pre-Op Diagnosis:     Chronic diarrhea  Providers:            Dc Noyola MD  Sedation:             Monitored Anesthesia Care  Procedure:       Pre-Anesthesia Assessment:       - Prior to the procedure, a History and Physical was performed, and       patient medications and allergies were reviewed. The patient's tolerance       of previous anesthesia was also reviewed. The risks and benefits of the       procedure and the sedation options and risks were discussed with the       patient. All questions were answered, and informed consent was obtained.       Prior Anticoagulants: The patient has taken no previous anticoagulant or       antiplatelet agents. ASA Grade Assessment: II - A patient with mild       systemic disease. After reviewing the risks and benefits, the patient       was deemed in satisfactory condition to undergo the procedure.       A History and Physical was performed prior to the procedure. Patient       medications and allergies were reviewed. The risks and benefits of the       procedure and sedation options were discussed. Questions were answered       and informed consent was obtained. Patient identification and proposed       procedure were verified. The patient was deemed in satisfactory       condition to undergo the procedure. The heart rate, respiratory rate,       oxygen saturations, blood pressure and response to sedation were       monitored throughout the procedure.       The endoscope was passed under direct vision. The Colonoscope was       introduced through the anus and advanced to 5 cm into the ileum. . The       physical status of the  patient was re-assessed after the procedure. The       colonoscopy was performed without difficulty. The patient tolerated the       procedure well. The quality of the bowel preparation was fair. The       terminal ileum was photographed.  Scope Withdrawal Time 0 hours 25 minutes 49 seconds  Findings:       A few small-mouthed diverticula were found in the entire colon.       Non-bleeding internal hemorrhoids were found during retroflexion. The       hemorrhoids were small.       Three sessile polyps were found in the rectum and in the cecum. The       polyps were 2 to 3 mm in size. These polyps were removed with a cold       biopsy forceps. Resection and retrieval were complete.       A localized area of mildly altered vascular mucosa was found in the       rectum. Biopsies were taken with a cold forceps for histology.       The terminal ileum appeared normal.  Post-Op Diagnosis:       - Diverticulosis in the entire examined colon.       - Non-bleeding internal hemorrhoids.       - Three 2 to 3 mm polyps in the rectum and in the cecum, removed with a       cold biopsy forceps. Resected and retrieved.       - Altered vascular mucosa in the rectum. Biopsied.       - The examined portion of the ileum was normal.       - Specimens were removed  Recommendation:       - Discharge patient to home (ambulatory).       - Resume regular diet daily.       - Continue present medications.       - Await pathology results.       - Repeat colonoscopy after studies are complete for surveillance based       on pathology results.  Complications:       No immediate complications.  Dc Noyola MD  4/10/2017 10:25:28 AM  Number of Addenda: 0  Procedure Code(s):    --- Professional ---                        63110, Colonoscopy, flexible; with biopsy, single or                        multiple  CPT copyright 2015 American Medical Association. All rights reserved.  The codes documented in this report are preliminary and upon  review  may  be revised to meet current compliance requirements.  Total Procedure Duration Time 0 hours 27 minutes 45 seconds  Estimated Blood Loss:       Estimated blood loss: none.  Scope In: 9:47:55 AM  Scope Out: 10:15:40 AM       No specimens removed during this procedure unless otherwise noted.       No assistants present.     Telephone

## 2021-02-01 NOTE — CONSULT NOTE ADULT - PROBLEM SELECTOR RECOMMENDATION 4
SLP recs appreciated. NPO  Per chart review, peg tube is not in line with patient's wishes.   Would recommend pleasure feeds if patient is awake and asking to be fed.

## 2021-02-01 NOTE — CONSULT NOTE ADULT - PROBLEM SELECTOR RECOMMENDATION 2
Per chart review, patient has PPSV ~10%, bedbound, nonverbal, dependent on all ADLs   Please assist with ADLs

## 2021-02-01 NOTE — CONSULT NOTE ADULT - CONVERSATION DETAILS
Palliative provider introduced self and role to patient's wife, Concha Kay. She was receptive and expressed understanding of our role in her 's care. She explained that her  has been declining since 1/20, increasing oxygen requirements and unable to take PO. She re-affirmed that she would like Higinio to be as comfortable as possible, DNR/DNI, no peg tube. She ideally would like to take patient home with hospice. I explained that based on his current needs, he looks more appropriate for inpatient hospice but palliative team can try to optimize his symptoms and possibly transition to solution medications to make him more appropriate for home. She was understanding of situation and shared that she herself was diagnosed with COVID recently and was hospitalized "two doors away from her ....and was only discharged last week". She stated that her 's HHA tested negative and understands that HHA may not be able to care for Higinio due to his +COVID status. She explained that her daughter lives nearby and can help care for him.     Discussed with ACP caring for patient and [facility] Liaison.

## 2021-02-01 NOTE — CONSULT NOTE ADULT - PROBLEM SELECTOR RECOMMENDATION 7
Patient with advanced parkinson's dementia, nonverbal, bedbound, hx of dysphagia, NPO currently   PPSV 10% with at least FAST 7d score

## 2021-02-01 NOTE — CONSULT NOTE ADULT - PROBLEM SELECTOR RECOMMENDATION 9
Dyspnea 2/2 COVID-19 PNA, currently on NRB 15L   - s/p dexamethasone course, unable to receive Remdesivir due to SUSAN   - Per chart review, medical teams have spoken with patient's wife re: ensuring patient is comfortable. Will follow up with wife to confirm her wishes and likely start morphine 1mg IV q2hrs prn dyspnea or respiratory distress Thank you for allowing us to participate in your patient's care. We will continue to follow with you. Please page [phone] for any q's or c's.     Faye Jaime D.O.   Palliative Medicine

## 2021-02-01 NOTE — CONSULT NOTE ADULT - ASSESSMENT
79 y/o M PMHx of Parkinsons, dementia (reported baseline nonverbal and somewhat somnolent), dysphagia, BPH was sent in for worsening SOB and decreased alertness in the s/o outpatient COVID positive test, found to have acute hypoxemic respiratory failure 2/2 COVID. Respiratory status stable but significant increased in leukocytosis, CXR with RLL opacity possibly aspiration.    COVID  -did not recieved remdesivir due to SUSAN, now out of window where it would be beneficial  -c/w dexamethasone    Leukocytosis  -steroids could be contributer but this is significant jump  -s/p CTX course  -CXR c/f new opacity, possible superimposed bacterial PNA/Aspiration  -check procalcitonin  -MRSA nasal swab  -Reports of previous UTI treated with bactrim PTA, initial UA with pyuria, no Ucx sent at the time. Would recommend repeat UA/Ucx, if positive consider renal abscess?  -f/u repeat Bcx    No recs final until signed by attending
80 year old male with a PMHx of Parkinson's disease, HTN, BPH, dysphagia, status post pacemaker who's here with acute hypoxic respiratory failure due to COVID-19

## 2021-02-01 NOTE — CONSULT NOTE ADULT - ATTENDING COMMENTS
80 year old male with Parkinson's dementia, dysphagia, BPH with COVID, on NRB. CXR with RLL PNA. Completed ceftriaxone 1/21 - 1/27 for possible UTI. Now with  increase in his leukocytosis possible aspiratoin pna given CXR findings. F/U blood cultures. Continue dexamethasone. Remdesivir held due to SUSAN, which has improved. Trend inflammatory markers. Trend d- dimer. Check PCT. Check MRSA nasal swab. Check UA/urine cx. Continue zosyn.     Sy Leggett MD  Pager [phone]  After 5pm/weekends call [phone]
Pt seen with fellow.  Agree with above.  Goal is for comfort.  Pt with respiratory failure due to covid with advancing PD dementia.  Morphine iv prn sob.  Low threshold to start gtt if pt receives more than 3 consecutive prn doses.  Hospice discussed.  Referral made.  Will follow up in am.

## 2021-02-01 NOTE — CONSULT NOTE ADULT - PROBLEM SELECTOR RECOMMENDATION 5
acute respiratory failure 2/2 COVID-19 PNA, currently on NRB 15L   - s/p dexamethasone course, unable to receive Remdesivir due to SUSAN   - Per chart review, medical teams have spoken with patient's wife re: ensuring patient is comfortable. Will follow up with wife to confirm her wishes and likely start morphine 1mg IV q2hrs prn dyspnea or respiratory distress

## 2021-02-01 NOTE — CONSULT NOTE ADULT - SUBJECTIVE AND OBJECTIVE BOX
**Per current hospital emergency protocol, in an effort to reduce COVID-19 exposures, consult information was obtained via chart review and telephonic communication.**    HPI: History limited 2/2 to dementia, obtained from Wife Concha     80 year old male with a pmhx of HTN, Parkinsons, s/p pacemaker, dysphagia, BPH, seizures? is sent to ED from home for evaluation of SOB and decreased level of alertness in setting of known COVID-19 positive. Wife reports patient to be nonverbal and somnolent at baseline but over the past few days noted to be less responsive than usual. She also noted that appears to be more short of breath. She denies any asymmetry of the face or focal weakness of the extremities. She tested positive last week, patient reportedly tested negative. She states today he had a repeat test done at the house and tested positive. He also was recently treated for a UTI with bactrim and completed the full course of antibiotics. Wife states patient is DNR and DNI and would not want to have CPR or intubation. He is no longer on carbidopa levodopa as he did not tolerate the medication. (21 Jan 2021 01:37)    PERTINENT PM/SXH:   Pacemaker    History of BPH    Parkinson disease    Hypertension, unspecified type      S/P cholecystectomy    H/O gastric bypass      FAMILY HISTORY:  No pertinent family history in first degree relatives      ITEMS NOT CHECKED ARE NOT PRESENT    SOCIAL HISTORY:   Significant other/partner[x- Concha ]  Children[ ]  Episcopalian/Spirituality:  Substance hx:  [ ]   Tobacco hx:  [ ]   Alcohol hx: [ ]   Home Opioid hx:  [ ] I-Stop Reference No:  Living Situation: [x ]Home with HHA  [ ]Long term care  [ ]Rehab [ ]Other    ADVANCE DIRECTIVES:    DNR  Yes    MOLST  [x ]  Living Will  [ ]   DECISION MAKER(s):  [ ] Health Care Proxy(s)  [x ] Surrogate(s)  [ ] Guardian           Name(s): Concha Kay (wife) Phone Number(s): [phone]    BASELINE (I)ADL(s) (prior to admission):  [place]: [ ]Total  [ ] Moderate [ x]Dependent    Allergies    No Known Allergies    Intolerances    MEDICATIONS  (STANDING):  ascorbic acid 500 milliGRAM(s) Oral daily  carvedilol 3.125 milliGRAM(s) Oral every 12 hours  dextrose 5%. 1000 milliLiter(s) (100 mL/Hr) IV Continuous <Continuous>  escitalopram 20 milliGRAM(s) Oral daily  finasteride 5 milliGRAM(s) Oral daily  heparin   Injectable 5000 Unit(s) SubCutaneous every 8 hours  OXcarbazepine Suspension 300 milliGRAM(s) Oral two times a day  pantoprazole    Tablet 40 milliGRAM(s) Oral before breakfast  piperacillin/tazobactam IVPB.. 3.375 Gram(s) IV Intermittent every 8 hours  tamsulosin 0.4 milliGRAM(s) Oral at bedtime  zinc sulfate 220 milliGRAM(s) Oral daily    MEDICATIONS  (PRN):  acetaminophen  Suppository .. 650 milliGRAM(s) Rectal every 4 hours PRN Temp greater or equal to 38.5C (101.3F)  metoprolol tartrate Injectable 5 milliGRAM(s) IV Push every 6 hours PRN tachycardia and hypertension    PRESENT SYMPTOMS: [ ]Unable to obtain due to poor mentation   Source if other than patient:  [ ]Family   [ ]Team     Pain: [ ]yes [ ]no  QOL impact -   Location -                    Aggravating factors -  Quality -  Radiation -  Timing-  Severity (0-10 scale):  Minimal acceptable level (0-10 scale):     PAIN AD Score:     http://geriatrictoolkit.[place].[facility]/cog/painad.pdf (press ctrl +  left click to view)    Dyspnea:                           [ ]Mild [ ]Moderate [ ]Severe  Anxiety:                             [ ]Mild [ ]Moderate [ ]Severe  Fatigue:                             [ ]Mild [ ]Moderate [ ]Severe  Nausea:                             [ ]Mild [ ]Moderate [ ]Severe  Loss of appetite:              [ ]Mild [ ]Moderate [ ]Severe  Constipation:                    [ ]Mild [ ]Moderate [ ]Severe    Other Symptoms:  [ ]All other review of systems negative     Palliative Performance Status Version 2:      10   %    http://npcrc.org/files/news/palliative_performance_scale_ppsv2.pdf  PHYSICAL EXAM:  Vital Signs Last 24 Hrs  T(C): 36.3 (01 Feb 2021 05:12), Max: 36.9 (31 Jan 2021 13:11)  T(F): 97.4 (01 Feb 2021 05:12), Max: 98.5 (31 Jan 2021 13:11)  HR: 63 (01 Feb 2021 05:12) (63 - 73)  BP: 123/46 (01 Feb 2021 05:12) (120/56 - 140/60)  BP(mean): --  RR: 22 (01 Feb 2021 05:12) (20 - 24)  SpO2: 95% (01 Feb 2021 05:12) (95% - 99%) I&O's Summary    01 Feb 2021 07:01  -  01 Feb 2021 12:30  --------------------------------------------------------  IN: 0 mL / OUT: 600 mL / NET: -600 mL      GENERAL:  [ ]Alert  [ ]Oriented x   [ ]Lethargic  [ ]Cachexia  [ ]Unarousable  [ ]Verbal  [ ]Non-Verbal  Behavioral:   [ ] Anxiety  [ ] Delirium [ ] Agitation [ ] Other  HEENT:  [ ]Normal   [ ]Dry mouth   [ ]ET Tube/Trach  [ ]Oral lesions  PULMONARY:   [ ]Clear [ ]Tachypnea  [ ]Audible excessive secretions   [ ]Rhonchi        [ ]Right [ ]Left [ ]Bilateral  [ ]Crackles        [ ]Right [ ]Left [ ]Bilateral  [ ]Wheezing     [ ]Right [ ]Left [ ]Bilatera  [ ]Diminished breath sounds [ ]right [ ]left [ ]bilateral  CARDIOVASCULAR:    [ ]Regular [ ]Irregular [ ]Tachy  [ ]Gee [ ]Murmur [ ]Other  GASTROINTESTINAL:  [ ]Soft  [ ]Distended   [ ]+BS  [ ]Non tender [ ]Tender  [ ]PEG [ ]OGT/ NGT  Last BM:     GENITOURINARY:  [ ]Normal [ ] Incontinent   [ ]Oliguria/Anuria   [ ]Davis  MUSCULOSKELETAL:   [ ]Normal   [ ]Weakness  [ ]Bed/Wheelchair bound [ ]Edema  NEUROLOGIC:   [ ]No focal deficits  [ ]Cognitive impairment  [ ]Dysphagia [ ]Dysarthria [ ]Paresis [ ]Other   SKIN:   [ ]Normal    [ ]Rash  [ ]Pressure ulcer(s)       Present on admission [ ]y [ ]n    CRITICAL CARE:  [ ] Shock Present  [ ]Septic [ ]Cardiogenic [ ]Neurologic [ ]Hypovolemic  [ ]  Vasopressors [ ]  Inotropes   [ ]Respiratory failure present [ ]Mechanical ventilation [ ]Non-invasive ventilatory support [ ]High flow  [ ]Acute  [ ]Chronic [ ]Hypoxic  [ ]Hypercarbic [ ]Other  [ ]Other organ failure     LABS:                        5.4    10.74 )-----------( 71       ( 01 Feb 2021 08:05 )             22.9   01-31    147<H>  |  110<H>  |  25<H>  ----------------------------<  81  3.6   |  27  |  0.90    Ca    8.3<L>      31 Jan 2021 08:00  Phos  2.3     01-31  Mg     2.1     01-31    RADIOLOGY & ADDITIONAL STUDIES:  < from: Xray Chest 1 View- PORTABLE-Urgent (Xray Chest 1 View- PORTABLE-Urgent .) (01.31.21 @ 12:44) >    IMPRESSION: Patchy right lower lobe pneumonia, slightly worsened.    < end of copied text >      PROTEIN CALORIE MALNUTRITION PRESENT: [ ]mild [ ]moderate [x ]severe [ ]underweight [ ]morbid obesity  https://www.andeal.org/vault/2440/web/files/ONC/Table_Clinical%20Characteristics%20to%20Document%20Malnutrition-White%20JV%20et%20al%202012.pdf      Weight (kg): 51.9 (01-22-21 @ 06:36)    [ x]PPSV2 < or = to 30% [ ]significant weight loss  [ ]poor nutritional intake  [ ]anasarca     Albumin, Serum: 2.7 g/dL (01-25-21 @ 05:17)   [ ]Artificial Nutrition      REFERRALS:   [ ]Chaplaincy  [x ]Hospice  [ ]Child Life  [ x]Social Work  [ ]Case management [ ]Holistic Therapy     Goals of Care Document:  **Per current hospital emergency protocol, in an effort to reduce COVID-19 exposures, consult information was obtained via chart review and telephonic communication.**    HPI: History limited 2/2 to dementia, obtained from Wife Concha     80 year old male with a pmhx of HTN, Parkinsons, s/p pacemaker, dysphagia, BPH, seizures? is sent to ED from home for evaluation of SOB and decreased level of alertness in setting of known COVID-19 positive. Wife reports patient to be nonverbal and somnolent at baseline but over the past few days noted to be less responsive than usual. She also noted that appears to be more short of breath. She denies any asymmetry of the face or focal weakness of the extremities. She tested positive last week, patient reportedly tested negative. She states today he had a repeat test done at the house and tested positive. He also was recently treated for a UTI with bactrim and completed the full course of antibiotics. Wife states patient is DNR and DNI and would not want to have CPR or intubation. He is no longer on carbidopa levodopa as he did not tolerate the medication. (21 Jan 2021 01:37)    PERTINENT PM/SXH:   Pacemaker    History of BPH    Parkinson disease    Hypertension, unspecified type      S/P cholecystectomy    H/O gastric bypass      FAMILY HISTORY:  No pertinent family history in first degree relatives      ITEMS NOT CHECKED ARE NOT PRESENT    SOCIAL HISTORY:   Significant other/partner[x- Concha ]  Children[ ]  Taoism/Spirituality:  Substance hx:  [ ]   Tobacco hx:  [ ]   Alcohol hx: [ ]   Home Opioid hx:  [ ] I-Stop Reference No:  Living Situation: [x ]Home with HHA  [ ]Long term care  [ ]Rehab [ ]Other    ADVANCE DIRECTIVES:    DNR  Yes    MOLST  [x ]  Living Will  [ ]   DECISION MAKER(s):  [ ] Health Care Proxy(s)  [x ] Surrogate(s)  [ ] Guardian           Name(s): Concha Kay (wife) Phone Number(s): [phone]    BASELINE (I)ADL(s) (prior to admission):  [place]: [ ]Total  [ ] Moderate [ x]Dependent    Allergies    No Known Allergies    Intolerances    MEDICATIONS  (STANDING):  ascorbic acid 500 milliGRAM(s) Oral daily  carvedilol 3.125 milliGRAM(s) Oral every 12 hours  dextrose 5%. 1000 milliLiter(s) (100 mL/Hr) IV Continuous <Continuous>  escitalopram 20 milliGRAM(s) Oral daily  finasteride 5 milliGRAM(s) Oral daily  heparin   Injectable 5000 Unit(s) SubCutaneous every 8 hours  OXcarbazepine Suspension 300 milliGRAM(s) Oral two times a day  pantoprazole    Tablet 40 milliGRAM(s) Oral before breakfast  piperacillin/tazobactam IVPB.. 3.375 Gram(s) IV Intermittent every 8 hours  tamsulosin 0.4 milliGRAM(s) Oral at bedtime  zinc sulfate 220 milliGRAM(s) Oral daily    MEDICATIONS  (PRN):  acetaminophen  Suppository .. 650 milliGRAM(s) Rectal every 4 hours PRN Temp greater or equal to 38.5C (101.3F)  metoprolol tartrate Injectable 5 milliGRAM(s) IV Push every 6 hours PRN tachycardia and hypertension    PRESENT SYMPTOMS: [ ]Unable to obtain due to poor mentation   Source if other than patient:  [ ]Family   [ ]Team     Pain: [ ]yes [ ]no- DEFERRED DUE TO COVID. Discussed with PRIMARY MEDICAL TEAM    QOL impact -   Location -                    Aggravating factors -  Quality -  Radiation -  Timing-  Severity (0-10 scale):  Minimal acceptable level (0-10 scale):     PAIN AD Score:     http://geriatrictoolkit.[facility]/cog/painad.pdf (press ctrl +  left click to view)    Dyspnea:                           [ ]Mild [ ]Moderate [ ]Severe  Anxiety:                             [ ]Mild [ ]Moderate [ ]Severe  Fatigue:                             [ ]Mild [ ]Moderate [ ]Severe  Nausea:                             [ ]Mild [ ]Moderate [ ]Severe  Loss of appetite:              [ ]Mild [ ]Moderate [ ]Severe  Constipation:                    [ ]Mild [ ]Moderate [ ]Severe    Other Symptoms:  [ ]All other review of systems negative     Palliative Performance Status Version 2:      10   %    http://npcrc.org/files/news/palliative_performance_scale_ppsv2.pdf    PHYSICAL EXAM:  Vital Signs Last 24 Hrs  T(C): 36.3 (01 Feb 2021 05:12), Max: 36.9 (31 Jan 2021 13:11)  T(F): 97.4 (01 Feb 2021 05:12), Max: 98.5 (31 Jan 2021 13:11)  HR: 63 (01 Feb 2021 05:12) (63 - 73)  BP: 123/46 (01 Feb 2021 05:12) (120/56 - 140/60)  BP(mean): --  RR: 22 (01 Feb 2021 05:12) (20 - 24)  SpO2: 95% (01 Feb 2021 05:12) (95% - 99%) I&O's Summary    01 Feb 2021 07:01  -  01 Feb 2021 12:30  --------------------------------------------------------  IN: 0 mL / OUT: 600 mL / NET: -600 mL      GENERAL: DEFERRED DUE TO COVID. PLEASE SEE PRIMARY TEAM PHYSICAL EXAM   [ ]Alert  [ ]Oriented x   [ ]Lethargic  [ ]Cachexia  [ ]Unarousable  [ ]Verbal  [ ]Non-Verbal  Behavioral:   [ ] Anxiety  [ ] Delirium [ ] Agitation [ ] Other  HEENT:  [ ]Normal   [ ]Dry mouth   [ ]ET Tube/Trach  [ ]Oral lesions  PULMONARY:   [ ]Clear [ ]Tachypnea  [ ]Audible excessive secretions   [ ]Rhonchi        [ ]Right [ ]Left [ ]Bilateral  [ ]Crackles        [ ]Right [ ]Left [ ]Bilateral  [ ]Wheezing     [ ]Right [ ]Left [ ]Bilatera  [ ]Diminished breath sounds [ ]right [ ]left [ ]bilateral  CARDIOVASCULAR:    [ ]Regular [ ]Irregular [ ]Tachy  [ ]Gee [ ]Murmur [ ]Other  GASTROINTESTINAL:  [ ]Soft  [ ]Distended   [ ]+BS  [ ]Non tender [ ]Tender  [ ]PEG [ ]OGT/ NGT  Last BM:     GENITOURINARY:  [ ]Normal [ ] Incontinent   [ ]Oliguria/Anuria   [ ]Davis  MUSCULOSKELETAL:   [ ]Normal   [ ]Weakness  [ ]Bed/Wheelchair bound [ ]Edema  NEUROLOGIC:   [ ]No focal deficits  [ ]Cognitive impairment  [ ]Dysphagia [ ]Dysarthria [ ]Paresis [ ]Other   SKIN:   [ ]Normal    [ ]Rash  [ ]Pressure ulcer(s)       Present on admission [ ]y [ ]n    CRITICAL CARE:  [ ] Shock Present  [ ]Septic [ ]Cardiogenic [ ]Neurologic [ ]Hypovolemic  [ ]  Vasopressors [ ]  Inotropes   [ ]Respiratory failure present [ ]Mechanical ventilation [ ]Non-invasive ventilatory support [ ]High flow  [ ]Acute  [ ]Chronic [ ]Hypoxic  [ ]Hypercarbic [ ]Other  [ ]Other organ failure     LABS:                        5.4    10.74 )-----------( 71       ( 01 Feb 2021 08:05 )             22.9   01-31    147<H>  |  110<H>  |  25<H>  ----------------------------<  81  3.6   |  27  |  0.90    Ca    8.3<L>      31 Jan 2021 08:00  Phos  2.3     01-31  Mg     2.1     01-31    RADIOLOGY & ADDITIONAL STUDIES:  < from: Xray Chest 1 View- PORTABLE-Urgent (Xray Chest 1 View- PORTABLE-Urgent .) (01.31.21 @ 12:44) >    IMPRESSION: Patchy right lower lobe pneumonia, slightly worsened.    < end of copied text >      PROTEIN CALORIE MALNUTRITION PRESENT: [ ]mild [ ]moderate [x ]severe [ ]underweight [ ]morbid obesity  https://www.andeal.org/vault/2440/web/files/ONC/Table_Clinical%20Characteristics%20to%20Document%20Malnutrition-White%20JV%20et%20al%202012.pdf      Weight (kg): 51.9 (01-22-21 @ 06:36)    [ x]PPSV2 < or = to 30% [ ]significant weight loss  [ ]poor nutritional intake  [ ]anasarca     Albumin, Serum: 2.7 g/dL (01-25-21 @ 05:17)   [ ]Artificial Nutrition      REFERRALS:   [ ]Chaplaincy  [x ]Hospice  [ ]Child Life  [ x]Social Work  [ ]Case management [ ]Holistic Therapy     Goals of Care Document:

## 2021-02-01 NOTE — CONSULT NOTE ADULT - PROBLEM SELECTOR RECOMMENDATION 8
DNR/DNI. Please ensure MOLST form is complete.   Palliative team will re-affirm these goc and introduce hospice services (inpatient vs. home, but he appears to be more inpatient appropriate). DNR/DNI. Please ensure MOLST form is complete. Please see goc note attached.   Palliative team re-affirmed these goc and introduce hospice services (inpatient vs. home, but he appears to be more inpatient appropriate). Discussed with wife who was amenable to hospice referral.

## 2021-02-01 NOTE — PROGRESS NOTE ADULT - ASSESSMENT
80 year old male with Parkinson's dementia, dysphagia, BPH with COVID, on NRB. CXR with RLL PNA. Completed ceftriaxone 1/21 - 1/27 for possible UTI. Now with  increase in his leukocytosis possible aspiratoin pna given CXR findings.     Recommend:  #COVID PNA  -Completed 10 days of dexamethasone  -Remdesivir held due to SUSAN, which has improved.  -Trend inflammatory markers, d-dimer and pct  -UA with low WBC, points away from UTI, would monitor for now    #Aspiration PNA  -Continue zosyn   -Aspiration precautions    #Leukocytosis  -Improving  -Continue abx  -Trend to normal    Sy Leggett MD  Pager [phone]  After 5pm/weekends call [phone]

## 2021-02-02 NOTE — PROGRESS NOTE ADULT - ASSESSMENT
80 year old male with Parkinson's dementia, dysphagia, BPH with COVID, on NRB. CXR with RLL PNA. Completed ceftriaxone 1/21 - 1/27 for possible UTI. Now with  increase in his leukocytosis possible aspiratoin pna given CXR findings.     Recommend:  #COVID PNA  -Completed 10 days of dexamethasone  -Remdesivir held due to SUSAN, which has improved.  -Trend inflammatory markers, d-dimer and pct    #Aspiration PNA  -Continue zosyn to complete a 7 day course tomorrow 2/3.  -Aspiration precautions    #Leukocytosis  -Elevated today  -Continue abx  -Trend to normal    Sy Leggett MD  Pager [phone]  After 5pm/weekends call [phone]

## 2021-02-02 NOTE — CHART NOTE - NSCHARTNOTEFT_GEN_A_CORE
Chart reviewed. No PRNs required over 24 hours. Would recommend discontinuing PO medications as patient unable to tolerate. If patient continues to be asymptomatic, will transition to PO solution medications tomorrow.     Thank you for allowing us to participate in your patient's care. We will continue to follow with you. Please page [phone] for any q's or c's.     Faye Jaime D.O.   Palliative Medicine

## 2021-02-03 NOTE — PROGRESS NOTE ADULT - ASSESSMENT
Patient is a 80 year old male with a PMHx of Parkinson's disease, HTN, BPH, dysphagia, status post pacemaker who's here with acute hypoxic respiratory failure due to COVID-19. Now transitioned to palliative measures. Awaiting placement to hospice facilities.

## 2021-02-03 NOTE — PROGRESS NOTE ADULT - ASSESSMENT
80 year old male with a PMHx of Parkinson's disease, HTN, BPH, dysphagia, status post pacemaker who's here with acute hypoxic respiratory failure due to COVID-19. Palliative consulted for complex decision making regarding covid pna.

## 2021-02-03 NOTE — PROGRESS NOTE ADULT - ASSESSMENT
80 year old male with Parkinson's dementia, dysphagia, BPH with COVID, on NRB. CXR with RLL PNA. Completed ceftriaxone 1/21 - 1/27 for possible UTI. Now with  increase in his leukocytosis possible aspiratoin pna given CXR findings.     Recommend:  #COVID PNA  -Completed 10 days of dexamethasone  -Remdesivir held due to SUSAN, which has improved.  -Trend inflammatory markers, d-dimer and pct    #Aspiration PNA  -Continue zosyn to complete a 7 day course today 2/3.  -Aspiration precautions    #Leukocytosis  -Overall improving  -Continue abx  -Trend to normal    Sy Leggett MD  Pager [phone]  After 5pm/weekends call [phone]

## 2021-02-04 NOTE — PROGRESS NOTE ADULT - ASSESSMENT
Patient is a 80 year old male with a PMHx of Parkinson's disease, HTN, BPH, dysphagia, status post pacemaker who's here with acute hypoxic respiratory failure due to COVID-19. Now transitioned to palliative measures. Awaiting home w/ hospice 2/7/2021.

## 2021-02-04 NOTE — PROGRESS NOTE ADULT - ATTENDING COMMENTS
Pt seen with fellow.  Agree with above.  Met with family who was visiting for end of life.  Goal is comfort.  Unable to take pt home on nrb.  Asked for re swab to see if pt eligible for closer inpatient facility.  Continue morphine iv prn dyspnea.
Pt seen with fellow. Agree with above.  pt now on 6l nc and more awake.  Goal is home with hospice.  d/w family and hcn.

## 2021-02-04 NOTE — PROGRESS NOTE ADULT - PROBLEM SELECTOR PLAN 9
Thank you for allowing us to participate in your patient's care. We will continue to follow with you. Please page [phone] for any q's or c's.     Faye Jaime D.O.   Palliative Medicine
Thank you for allowing us to participate in your patient's care. We will continue to follow with you. Please page [phone] for any q's or c's.     Faye Jaime D.O.   Palliative Medicine
DVT ppx: heparin sq  Code status: DNR/DNI, molst in chart

## 2021-02-04 NOTE — PROGRESS NOTE ADULT - PROBLEM SELECTOR PROBLEM 9
Need for prophylactic measure
Encounter for palliative care
Encounter for palliative care
Need for prophylactic measure

## 2021-02-04 NOTE — PROGRESS NOTE ADULT - PROBLEM SELECTOR PROBLEM 8
Advanced care planning/counseling discussion
Advanced care planning/counseling discussion
History of BPH

## 2021-02-04 NOTE — PROGRESS NOTE ADULT - PROBLEM SELECTOR PLAN 7
-not on carbidopa/levodopa 2/2 to side effects  -c/w dysphagia 1 diet w/ thickened liquids
Patient with advanced parkinson's dementia, nonverbal, bedbound, hx of dysphagia, NPO   PPSV 10% with at least FAST 7d score.
-not on carbidopa/levodopa 2/2 to side effects  -c/w dysphagia 1 diet w/ thickened liquids
Patient with advanced parkinson's dementia, nonverbal, bedbound, hx of dysphagia, NPO   PPSV 30% with at least FAST 7d score.

## 2021-02-04 NOTE — PROGRESS NOTE ADULT - ASSESSMENT
80 year old male with Parkinson's dementia, dysphagia, BPH with COVID, on NRB. CXR with RLL PNA. Completed ceftriaxone 1/21 - 1/27 for possible UTI. Now with  increase in his leukocytosis possible aspiratoin pna given CXR findings.     Recommend:  #COVID PNA  -Completed 10 days of dexamethasone  -Remdesivir held due to SUSAN, which has improved.  -Trend inflammatory markers, d-dimer and pct  -On 6L NC    #Aspiration PNA  -Discontinue zosyn - today day #8.  -Aspiration precautions    #Leukocytosis  -Overall improving  -Trend to normal    Sy Leggett MD  Pager [phone]  After 5pm/weekends call [phone]

## 2021-02-04 NOTE — PROGRESS NOTE ADULT - ASSESSMENT
complete note to follow  Transitioned IV symptom meds to PO solution   Patient now on 6L O2 via NC.   Spoke with daughter, Catherine, who would like to coordinate transition for home hospice ASAP. Daughter, and wife able to care for him at home. DAughter also spoke with A agency who states dad is outside of infectious window for covid and aid would be able to assist with caring for dad at home.  80 year old male with a PMHx of Parkinson's disease, HTN, BPH, dysphagia, status post pacemaker who's here with acute hypoxic respiratory failure due to COVID-19. Palliative consulted for complex decision making regarding covid pna.     complete note to follow  Transitioned IV symptom meds to PO solution   Patient now on 6L O2 via NC.   Spoke with daughter, Catherine, who would like to coordinate transition for home hospice ASAP. Daughter, and wife able to care for him at home. DAughter also spoke with A agency who states dad is outside of infectious window for covid and aid would be able to assist with caring for dad at home.

## 2021-02-04 NOTE — PROGRESS NOTE ADULT - PROBLEM SELECTOR PLAN 8
DNR/DNI. MOLST by primary team. Please see [facility] note attached on 2/1.   Hospice referral, inpatient appropriate as he is requiring 15L NRB.  Please re-swab patient for covid as [place] [facility] family. They are interested in other inpatient hospice facilities that are accepting COVID patients that are closer to [place] ([facility], [place], etc)
DNR/DNI. MOLST by primary team. Please see [facility] note attached on 2/1.   Hospice referral for home as patient now on 6L NC. Wife and daughter are able to care for patient. Daughter spoke with home health aid agency regarding getting assistance at home with +COVID status. She was told that dad is likely out of infectious window stage and [facility] would be able to return.
-c/w flomax and finasteride

## 2021-02-04 NOTE — PROGRESS NOTE ADULT - PROBLEM SELECTOR PROBLEM 6
Hypertension, unspecified type
Hypertension, unspecified type
Sacral wound
Hypertension, unspecified type
SUSAN (acute kidney injury)
Hypertension, unspecified type
Aspiration pneumonia of right lower lobe, unspecified aspiration pneumonia type
Sacral wound

## 2021-02-04 NOTE — PROGRESS NOTE ADULT - PROBLEM SELECTOR PLAN 6
-c/w coreg
Stage 1 sacral wound  reposition routinely.
-c/w coreg
-creatinine has improved from 1.64 to 1.14 as of today. Continue to trend levels. Avoid nephrotoxic medications as warranted.
-patient's oxygenation has been stable but had significant jump in WBC to ~38 yesterday from 15 the day before. Seen by ID. Repeat CXR concerning for new RLL opacity and given leukocytosis concern is that he has an aspiration pneumonia. No diarrhea noted to suggest C.Diff. Blood cultures and repeat UA/urine culture ordered. Started on Zosyn.
Stage 1 sacral wound  reposition routinely.
-c/w coreg
-c/w coreg

## 2021-02-05 NOTE — PROGRESS NOTE ADULT - ASSESSMENT
80 year old male with Parkinson's dementia, dysphagia, BPH with COVID, on NRB. CXR with RLL PNA. Completed ceftriaxone 1/21 - 1/27 for possible UTI. Now with  increase in his leukocytosis possible aspiratoin pna given CXR findings.     Recommend:  #COVID PNA  -Completed 10 days of dexamethasone  -Remdesivir held due to SUSAN, which has improved.  -Trend inflammatory markers, d-dimer and pct  -Remains on 6L NC    #Aspiration PNA  -Completed zosyn 1/28 - 2/5  -Aspiration precautions    #Leukocytosis  -Overall improving  -Trend to normal    Sy Leggett MD  Pager [phone]  After 5pm/weekends call [phone]    Please call with questions.

## 2021-02-05 NOTE — DISCHARGE NOTE NURSING/CASE MANAGEMENT/SOCIAL WORK - PATIENT PORTAL LINK FT
You can access the FollowMyHealth Patient Portal offered by [facility] by registering at the following website: [email or web address]. By joining [employer]’s FollowMyHealth portal, you will also be able to view your health information using other applications (apps) compatible with our system.

## 2021-02-05 NOTE — PROGRESS NOTE ADULT - NSHPATTENDINGPLANDISCUSS_GEN_ALL_CORE
primary team.
family, team
family, team

## 2021-02-05 NOTE — CHART NOTE - NSCHARTNOTEFT_GEN_A_CORE
Chart reviewed. Over the past 24 hours, patient required PRNs of robinul x2. He was seen this morning, awake, non-verbal, but comfortable overall. Patient is awaiting home hospice dispo this weekend.     Thank you for allowing us to participate in your patient's care. We will continue to follow with you. Please page [phone] for any q's or c's.     Faye Jaime D.O.   Palliative Medicine

## 2021-02-06 NOTE — PROGRESS NOTE ADULT - PROBLEM SELECTOR PROBLEM 4
Hypernatremia
Parkinson disease
Dysphagia
Parkinson disease
Parkinson disease
Hypernatremia
Hypernatremia
Parkinson disease
2019 novel coronavirus disease (COVID-19)
Parkinson disease
Dysphagia
2019 novel coronavirus disease (COVID-19)
Hypernatremia
Parkinson disease
SUSAN (acute kidney injury)
2019 novel coronavirus disease (COVID-19)
Hypernatremia
SUSAN (acute kidney injury)
2019 novel coronavirus disease (COVID-19)

## 2021-02-06 NOTE — PROGRESS NOTE ADULT - PROBLEM SELECTOR PROBLEM 2
Aspiration pneumonia of right lower lobe, unspecified aspiration pneumonia type
Hypoxia
Acute respiratory failure, unspecified whether with hypoxia or hypercapnia
Functional quadriplegia
Hypoxia
Functional quadriplegia
Hypoxia
Aspiration pneumonia of right lower lobe, unspecified aspiration pneumonia type
Acute respiratory failure, unspecified whether with hypoxia or hypercapnia
Acute respiratory failure, unspecified whether with hypoxia or hypercapnia
History of BPH
Aspiration pneumonia of right lower lobe, unspecified aspiration pneumonia type
Acute respiratory failure, unspecified whether with hypoxia or hypercapnia
Parkinson disease
Aspiration pneumonia of right lower lobe, unspecified aspiration pneumonia type
History of BPH
History of BPH
Acute respiratory failure, unspecified whether with hypoxia or hypercapnia
Hypoxia

## 2021-02-06 NOTE — PROGRESS NOTE ADULT - PROBLEM SELECTOR PLAN 4
-seen by speech and swallow and  recommendation was made to continue with puree and honey-thick liquids with appropriate use of feeding strategies of: 1. Sitting upright at 90 degrees. 2. Alternate liquids/solids. 3. Small sips/bites.
-c/w decadron. Holding remdesivir as above  -trend inflammatory markers
-c/w decadron. Holding remdesivir as above  -trend inflammatory markers
creatinine has improved from 1.64 to 0.98 as of today. Continue to trend levels. Avoid nephrotoxic medications as warranted.
- Normalized/stable
-creatinine has improved from 1.64 to 1.02 as of today. Continue to trend levels. Avoid nephrotoxic medications as warranted.
-patient is DNR. Prognosis guarded given multiple medical issues.
-patient likely not eating anything, given his nonverbal state and history of dysphagia. Would obtain speech and swallow evaluation. If not cleared by speech and swallow would need to discuss nutrition options with wife (such as PEG, enteral feedings via NG tube)  -sodium level today is 152. Would resume D5W at 75 mL per hour. Repeat sodium in AM.
Monitoring--->sodium level today improved from 157 to 147. Would continue IV fluids to D5W at 100 mL per hour. 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 Discussed nutrition with wife earlier this admission and she reported that she wouldn't want to pursue feeding tubes as that would be inconsistent with patient's wishes. Her hope was to ultimately have him home with hospice. Will discuss with her goals of care in more detail and see if she's amenable to palliative care here
- Normalized/stable
patient is DNR. Prognosis guarded given multiple medical issues.
patient is DNR. Prognosis guarded given multiple medical issues.
-c/w decadron. Holding remdesivir as above  -trend inflammatory markers  1/23  - add zinc, vitamin c, vitamin d  1/24  -to complete dexa course
-c/w decadron. Holding remdesivir as above  -trend inflammatory markers  1/23  - add zinc, vitamin c, vitamin d
Monitoring--->sodium level today improved from 157 to 147. Would continue IV fluids to D5W at 100 mL per hour. 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 Discussed nutrition with wife earlier this admission and she reported that she wouldn't want to pursue feeding tubes as that would be inconsistent with patient's wishes. Her hope was to ultimately have him home with hospice. Will discuss with her goals of care in more detail and see if she's amenable to palliative care here
patient is DNR. Prognosis guarded given multiple medical issues.
SLP recs appreciated. NPO  Per chart review, peg tube is not in line with patient's wishes.   Would recommend pleasure feeds if patient is awake and asking to be fed.
SLP recs appreciated. NPO  Per chart review, peg tube is not in line with patient's wishes.   Would recommend pleasure feeds if patient is awake and asking to be fed.
Monitoring--->sodium level today improved from 157 to 147. Would continue IV fluids to D5W at 100 mL per hour. 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 Discussed nutrition with wife earlier this admission and she reported that she wouldn't want to pursue feeding tubes as that would be inconsistent with patient's wishes. Her hope was to ultimately have him home with hospice. Will discuss with her goals of care in more detail and see if she's amenable to palliative care here

## 2021-02-06 NOTE — PROGRESS NOTE ADULT - PROBLEM SELECTOR PROBLEM 3
SUSAN (acute kidney injury)
AMS (altered mental status)
Aspiration pneumonia of right lower lobe, unspecified aspiration pneumonia type
AMS (altered mental status)
Parkinson disease
Aspiration pneumonia of right lower lobe, unspecified aspiration pneumonia type
Hypernatremia
SUSAN (acute kidney injury)
Hypernatremia
Aspiration pneumonia of right lower lobe, unspecified aspiration pneumonia type
Hypernatremia
History of BPH
Aspiration pneumonia of right lower lobe, unspecified aspiration pneumonia type
Hypernatremia
Aspiration pneumonia of right lower lobe, unspecified aspiration pneumonia type
AMS (altered mental status)
Protein calorie malnutrition
AMS (altered mental status)
Protein calorie malnutrition

## 2021-02-06 NOTE — PROGRESS NOTE ADULT - PROBLEM SELECTOR PROBLEM 1
Palliative care status
SIRS (systemic inflammatory response syndrome)
Acute respiratory failure, unspecified whether with hypoxia or hypercapnia
Dyspnea due to COVID-19
Acute respiratory failure, unspecified whether with hypoxia or hypercapnia
SIRS (systemic inflammatory response syndrome)
Acute respiratory failure, unspecified whether with hypoxia or hypercapnia
Palliative care status
Acute respiratory failure, unspecified whether with hypoxia or hypercapnia
Palliative care status
SIRS (systemic inflammatory response syndrome)
Acute respiratory failure, unspecified whether with hypoxia or hypercapnia
SIRS (systemic inflammatory response syndrome)
Acute respiratory failure, unspecified whether with hypoxia or hypercapnia
Palliative care status
Palliative care status
Dyspnea due to COVID-19

## 2021-02-06 NOTE — PROGRESS NOTE ADULT - PROBLEM SELECTOR PLAN 5
-unknown baseline Cr however potassium mildly elevated so concern for some SUSAN  -likely pre-renal as pt with poor po intake 2/2 to mental status  -will start on gentle maintenance fluids @ 50 with careful monitoring of resp status. Please discontinue if pt with increased oxygen requirements   -trend BMP  1/21  improving  1/22  appears resolved
Prognosis is grave given multiple medical issues.
Prognosis is grave given multiple medical issues.
-continue Flomax and Finasteride.
patient is DNR. Prognosis guarded given multiple medical issues.
-unknown baseline Cr however potassium mildly elevated so concern for some SUSAN  -likely pre-renal as pt with poor po intake 2/2 to mental status  -will start on gentle maintenance fluids @ 50 with careful monitoring of resp status. Please discontinue if pt with increased oxygen requirements   -trend BMP  1/21  improving
patient is DNR. Prognosis is grave given multiple medical issues.
sodium level today is 158. Would change IV fluids to D5W at 100 mL per hour.
sodium level today is 155. Would resume D5LR at 75 mL per hour. Repeat sodium in AM.
-unknown baseline Cr however potassium mildly elevated so concern for some SUSAN  -likely pre-renal as pt with poor po intake 2/2 to mental status  -will start on gentle maintenance fluids @ 50 with careful monitoring of resp status. Please discontinue if pt with increased oxygen requirements   -trend BMP  1/21  improving  1/22  appears resolved
patient is DNR. Prognosis is grave given multiple medical issues.
acute respiratory failure 2/2 COVID-19 PNA, currently on NRB 15L.   - please re-swab patient for covid.   - s/p dexamethasone course, unable to receive Remdesivir due to SUSAN   --Continue morphine 1mg IV q2hrs prn dyspnea or respiratory distress.
acute respiratory failure 2/2 COVID-19 PNA, currently on 6L NC   - s/p dexamethasone course, unable to receive Remdesivir due to SUSAN   - see above for sx management
-unknown baseline Cr however potassium mildly elevated so concern for some SUSAN  -likely pre-renal as pt with poor po intake 2/2 to mental status  -will start on gentle maintenance fluids @ 50 with careful monitoring of resp status. Please discontinue if pt with increased oxygen requirements   -trend BMP  1/21  improving  1/22  appears resolved

## 2021-02-06 NOTE — PROGRESS NOTE ADULT - PROBLEM SELECTOR PLAN 3
- afebrile now; cont anaerobic coverage w/ Zosyn for aspiration PNA  - down trending wbc 32----->10.74  - monitor fever  patient had significant jump in WBC to ~38 on 1/27 from 15 the day before. Seen by ID. Repeat CXR concerning for new RLL opacity and given leukocytosis concern is that he has an aspiration pneumonia. No diarrhea noted to suggest C.Diff. Blood cultures and repeat UA/urine culture ordered. UA not consistent with UTI and blood cultures with no growth to date. Started on Zosyn on the 28th
- afebrile now; cont anaerobic coverage w/ Zosyn for aspiration PNA  - down trending wbc 32----->10.74  - monitor fever  patient had significant jump in WBC to ~38 on 1/27 from 15 the day before. Seen by ID. Repeat CXR concerning for new RLL opacity and given leukocytosis concern is that he has an aspiration pneumonia. No diarrhea noted to suggest C.Diff. Blood cultures and repeat UA/urine culture ordered. UA not consistent with UTI and blood cultures with no growth to date. Started on Zosyn on the 28th
-continue Flomax and Finasteride.
sodium level today is pending. Was 157 yesterday. Would continue IV fluids to D5W at 100 mL per hour. 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 Discussed nutrition with wife yesterday and she reported that she wouldn't want to pursue feeding tubes as that would be inconsistent with patient's wishes. Her hope was to ultimately have him home with hospice. Will discuss with her goals of care in more detail and see if she's amenable to palliative care here.
Monitoring--->sodium level today improved from 157 to 147. Would continue IV fluids to D5W at 100 mL per hour. 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 Discussed nutrition with wife earlier this admission and she reported that she wouldn't want to pursue feeding tubes as that would be inconsistent with patient's wishes. Her hope was to ultimately have him home with hospice. Will discuss with her goals of care in more detail and see if she's amenable to palliative care here
-creatinine has improved from 1.64 to 0.95 as of today. Continue to trend levels. Avoid nephrotoxic medications as warranted.
sodium level today is 157. Would continue IV fluids to D5W at 100 mL per hour. 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 Will discuss enteral nutrition with wife.
- afebrile now; cont anaerobic coverage w/ Zosyn for aspiration PNA  - down trending wbc 32----->10.74  - monitor fever  patient had significant jump in WBC to ~38 on 1/27 from 15 the day before. Seen by ID. Repeat CXR concerning for new RLL opacity and given leukocytosis concern is that he has an aspiration pneumonia. No diarrhea noted to suggest C.Diff. Blood cultures and repeat UA/urine culture ordered. UA not consistent with UTI and blood cultures with no growth to date. Started on Zosyn on the 28th
Dietician recs appreciated and S&S recs appreciated. NPO   aspiration precautions.
-on oxcarbazpine  -nonverbal.
creatinine has improved from 1.64 to 1.20 as of today. Continue to trend levels. Avoid nephrotoxic medications as warranted.
sodium level today improved from 157 to 147. Would continue IV fluids to D5W at 100 mL per hour. 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 Discussed nutrition with wife earlier this admission and she reported that she wouldn't want to pursue feeding tubes as that would be inconsistent with patient's wishes. Her hope was to ultimately have him home with hospice. Will discuss with her goals of care in more detail and see if she's amenable to palliative care here
- afebrile now; cont anaerobic coverage w/ Zosyn for aspiration PNA  - down trending wbc 32----->10.74  - monitor fever  patient had significant jump in WBC to ~38 on 1/27 from 15 the day before. Seen by ID. Repeat CXR concerning for new RLL opacity and given leukocytosis concern is that he has an aspiration pneumonia. No diarrhea noted to suggest C.Diff. Blood cultures and repeat UA/urine culture ordered. UA not consistent with UTI and blood cultures with no growth to date. Started on Zosyn on the 28th
-likely 2/2 to infection  -no focal weakness noted by wife. Mental status at present does not appear far from what wife reported as baseline  -treat infection as above  -will hold off on CT head for now. Can consider if mental status does not improve
- afebrile now; cont anaerobic coverage w/ Zosyn for aspiration PNA  - down trending wbc 32----->10.74  - monitor fever  patient had significant jump in WBC to ~38 on 1/27 from 15 the day before. Seen by ID. Repeat CXR concerning for new RLL opacity and given leukocytosis concern is that he has an aspiration pneumonia. No diarrhea noted to suggest C.Diff. Blood cultures and repeat UA/urine culture ordered. UA not consistent with UTI and blood cultures with no growth to date. Started on Zosyn on the 28th
Dietician recs appreciated and S&S recs appreciated. NPO but pleasure feeds if awake.   aspiration precautions.
-likely 2/2 to infection  -no focal weakness noted by wife. Mental status at present does not appear far from what wife reported as baseline  -treat infection as above  -will hold off on CT head for now. Can consider if mental status does not improve  1/22  - appears stable

## 2021-02-06 NOTE — PROGRESS NOTE ADULT - PROBLEM SELECTOR PLAN 2
- Ongoing acute hypoxic respiratory failure due to COVID-19 pna; oxygen via NC 6L.  - completed 10 day course of Dexamethasone on January 30th  -continue Heparin SQ for DVT prophylaxis  -given initial SUSAN and renal dysfunction on admission he wasn't started on Remdesivir. Now outside window where it would be beneficial.  -patient had met SIRS criteria-likely due to underlying COVID pneumonia as CXR had shown bilateral opacities.   - U/A treated w/ rocephin  - Disposition: Hospice and comfort measures as per wife.
- Ongoing acute hypoxic respiratory failure due to COVID-19 pna; oxygen via NC 6L.  - completed 10 day course of Dexamethasone on January 30th  -continue Heparin SQ for DVT prophylaxis  -given initial SUSAN and renal dysfunction on admission he wasn't started on Remdesivir. Now outside window where it would be beneficial.  -patient had met SIRS criteria-likely due to underlying COVID pneumonia as CXR had shown bilateral opacities.   - U/A treated w/ rocephin  - Disposition: Hospice and comfort measures as per wife.
-continue Flomax and Finasteride
- Ongoing acute hypoxic respiratory failure due to COVID-19 pna; oxygen via NC 6L.  - completed 10 day course of Dexamethasone on January 30th  -continue Heparin SQ for DVT prophylaxis  -given initial SUSAN and renal dysfunction on admission he wasn't started on Remdesivir. Now outside window where it would be beneficial.  -patient had met SIRS criteria-likely due to underlying COVID pneumonia as CXR had shown bilateral opacities.   - U/A treated w/ rocephin  - Disposition: Hospice and comfort measures as per wife.
-continue Flomax and Finasteride.
patient had significant jump in WBC to ~38 on 1/27 from 15 the day before. Seen by ID. Repeat CXR concerning for new RLL opacity and given leukocytosis concern is that he has an aspiration pneumonia. No diarrhea noted to suggest C.Diff. Blood cultures and repeat UA/urine culture ordered. UA not consistent with UTI and blood cultures with no growth to date. Started on Zosyn.
- Ongoing acute hypoxic respiratory failure due to COVID-19 and cont. 15L of oxygen via NR[facility] to maintain saturations of 96%  - completed 10 day course of Dexamethasone on January 30th  -continue Heparin SQ for DVT prophylaxis  -given initial SUSAN and renal dysfunction on admission he wasn't started on Remdesivir. Now outside window where it would be beneficial.  -patient had met SIRS criteria-likely due to underlying COVID pneumonia as CXR had shown bilateral opacities.   - U/A treated w/ rocephin  - Disposition: Hospice and comfort measures as per wife.
Per chart review, patient has PPSV ~10%, bedbound, nonverbal, dependent on all ADLs   Please assist with ADLs.
patient had significant jump in WBC to ~38 on 1/27 from 15 the day before. Seen by ID. Repeat CXR concerning for new RLL opacity and given leukocytosis concern is that he has an aspiration pneumonia. No diarrhea noted to suggest C.Diff. Blood cultures and repeat UA/urine culture ordered. UA not consistent with UTI and blood cultures with no growth to date. Started on Zosyn.
-continue Flomax and Finasteride.
-seen by speech and swallow and  recommendation was made to continue with puree and honey-thick liquids with appropriate use of feeding strategies of: 1. Sitting upright at 90 degrees. 2. Alternate liquids/solids. 3. Small sips/bites.
Per chart review, patient has PPSV ~30%, bedbound, nonverbal, dependent on all ADLs   Please assist with ADLs.
- afebrile now; cont anaerobic coverage w/ Zosyn for aspiration PNA  - down trending wbc 32----->10.74  - monitor fever  patient had significant jump in WBC to ~38 on 1/27 from 15 the day before. Seen by ID. Repeat CXR concerning for new RLL opacity and given leukocytosis concern is that he has an aspiration pneumonia. No diarrhea noted to suggest C.Diff. Blood cultures and repeat UA/urine culture ordered. UA not consistent with UTI and blood cultures with no growth to date. Started on Zosyn on the 28th
patient had significant jump in WBC to ~38 on 1/27 from 15 the day before. Seen by ID. Repeat CXR concerning for new RLL opacity and given leukocytosis concern is that he has an aspiration pneumonia. No diarrhea noted to suggest C.Diff. Blood cultures and repeat UA/urine culture ordered. UA not consistent with UTI and blood cultures with no growth to date. Started on Zosyn on the 28th
- Ongoing acute hypoxic respiratory failure due to COVID-19 pna; oxygen via NC 6L.  - completed 10 day course of Dexamethasone on January 30th  -continue Heparin SQ for DVT prophylaxis  -given initial SUSAN and renal dysfunction on admission he wasn't started on Remdesivir. Now outside window where it would be beneficial.  -patient had met SIRS criteria-likely due to underlying COVID pneumonia as CXR had shown bilateral opacities.   - U/A treated w/ rocephin  - Disposition: Hospice and comfort measures as per wife.
-Pt hypoxic to 84% on RA. Currently on 4L NC  -likely 2/2 to covid infection vs less likely aspiration pna  -D dimer noted however pt not tachycardia, will hold off on CT angio to eval for PE for now nafisa in the setting of SUSAN  -Discussed remdesivir with wife. Given SUSAN, will hold off on starting for now. Can consider if renal function improves  -Wean off oxygen as tolerated
-Pt hypoxic to 84% on RA. Currently on 4L NC  -likely 2/2 to covid infection vs less likely aspiration pna  -D dimer noted however pt not tachycardia, will hold off on CT angio to eval for PE for now nafisa in the setting of SUSAN  -Discussed remdesivir with wife. Given SUSAN, will hold off on starting for now. Can consider if renal function improves  -Wean off oxygen as tolerated  1/24  - cont to wean as able, on 3L today

## 2021-02-06 NOTE — PROGRESS NOTE ADULT - SUBJECTIVE AND OBJECTIVE BOX
CC: Patient is a 80y old  Male who presents with a chief complaint of COVID (2021 14:10)    ID following for COVID PNA, aspiration pna    Interval History/ROS: Patient on NRB, remains nonverbal. No fevers, leukocytosis improving.    Rest of ROS negative.    Allergies  No Known Allergies    ANTIMICROBIALS:  piperacillin/tazobactam IVPB.. 3.375 every 8 hours    OTHER MEDS:  acetaminophen  Suppository .. 650 milliGRAM(s) Rectal every 4 hours PRN  ascorbic acid 500 milliGRAM(s) Oral daily  carvedilol 3.125 milliGRAM(s) Oral every 12 hours  dexAMETHasone  Injectable 6 milliGRAM(s) IV Push daily  dextrose 5%. 1000 milliLiter(s) IV Continuous <Continuous>  escitalopram 20 milliGRAM(s) Oral daily  finasteride 5 milliGRAM(s) Oral daily  heparin   Injectable 5000 Unit(s) SubCutaneous every 8 hours  metoprolol tartrate Injectable 5 milliGRAM(s) IV Push every 6 hours PRN  OXcarbazepine Suspension 300 milliGRAM(s) Oral two times a day  pantoprazole    Tablet 40 milliGRAM(s) Oral before breakfast  tamsulosin 0.4 milliGRAM(s) Oral at bedtime  zinc sulfate 220 milliGRAM(s) Oral daily    PE:    Vital Signs Last 24 Hrs  T(C): 36.9 (2021 12:59), Max: 36.9 (2021 12:59)  T(F): 98.4 (2021 12:59), Max: 98.4 (2021 12:59)  HR: 166 (2021 12:59) (74 - 166)  BP: 148/66 (2021 12:59) (138/67 - 148/66)  BP(mean): --  RR: 20 (2021 12:59) (18 - 20)  SpO2: 99% (2021 12:59) (97% - 99%)    Gen: Awake, NAD  CV: S1+S2 normal, no murmurs  Resp: Clear bilat, no resp distress  Abd: Soft, nontender, +BS  Ext: No LE edema, no wounds  : No Davis  IV/Skin: No thrombophlebitis  Neuro: nonverbal    LABS:                          13.0   25.89 )-----------( 211      ( 2021 08:43 )             43.8           157<H>  |  120<H>  |  39<H>  ----------------------------<  68<L>  4.1   |  25  |  1.14    Ca    8.9      2021 08:43  Phos  3.0       Mg     2.8             Urinalysis Basic - ( 2021 08:43 )    Color: Yellow / Appearance: Slightly Turbid / S.022 / pH: x  Gluc: x / Ketone: Negative  / Bili: Negative / Urobili: <2 mg/dL   Blood: x / Protein: Trace / Nitrite: Negative   Leuk Esterase: Negative / RBC: 9 /HPF / WBC 2 /HPF   Sq Epi: x / Non Sq Epi: 1 /HPF / Bacteria: Negative        MICROBIOLOGY:  v  .Nose  21   Culture in progress  --  --      .Blood Blood-Venous  21   No growth to date.  --  --      .Blood Blood-Peripheral  21   No Growth Final  --  --      .Blood Blood-Venous  21   No Growth Final  --  --    RADIOLOGY:    < from: Xray Chest 1 View- PORTABLE-Urgent (Xray Chest 1 View- PORTABLE-Urgent .) (21 @ 19:22) >  Impression: Suspect evolving infiltrate (pneumonia) right base.    < end of copied text >  
CC: Patient is a 80y old  Male who presents with a chief complaint of shortness of breath, AMS (04 Feb 2021 13:56)    ID following for PNA    Interval History/ROS: Patient on 6L NC. Remains with leukocytosis. Afebrile.    Rest of ROS negative.    Allergies  No Known Allergies    ANTIMICROBIALS:  piperacillin/tazobactam IVPB.. 3.375 every 8 hours    OTHER MEDS:  acetaminophen  Suppository .. 650 milliGRAM(s) Rectal every 4 hours PRN  dextrose 5%. 1000 milliLiter(s) IV Continuous <Continuous>  glycopyrrolate Injectable 0.2 milliGRAM(s) IV Push every 4 hours PRN  heparin   Injectable 5000 Unit(s) SubCutaneous every 8 hours  metoprolol tartrate Injectable 5 milliGRAM(s) IV Push every 6 hours PRN  morphine   Solution 2 milliGRAM(s) Oral every 2 hours PRN    PE:    Vital Signs Last 24 Hrs  T(C): 36.6 (04 Feb 2021 09:22), Max: 36.6 (03 Feb 2021 21:59)  T(F): 97.8 (04 Feb 2021 09:22), Max: 97.8 (03 Feb 2021 21:59)  HR: 72 (04 Feb 2021 09:22) (72 - 82)  BP: 133/58 (04 Feb 2021 09:22) (133/58 - 139/66)  BP(mean): --  RR: 20 (03 Feb 2021 21:59) (20 - 20)  SpO2: 95% (04 Feb 2021 09:22) (95% - 96%)    Gen: Awake, NAD, on NC  CV: S1+S2 normal, no murmurs  Resp: Clear bilat, no resp distress  Abd: Soft, nontender, +BS  Ext: No LE edema, no wounds  : No Davis  IV/Skin: No thrombophlebitis  Neuro: nonverbal    LABS:    MICROBIOLOGY:  v  .Urine Catheterized  01-29-21   <10,000 CFU/mL Normal Urogenital Lucia  --  --      .Nose  01-28-21   No MRSA isolated  No Staph Aureus (MSSA) isolated "This can represent normal nasal  carriage.  PCR is more sensitive for identifying MRSA/MSSA carriage"  --  --      .Blood Blood-Venous  01-28-21   No Growth Final  --  --      .Blood Blood-Peripheral  01-20-21   No Growth Final  --  --      .Blood Blood-Venous  01-20-21   No Growth Final  --  --    RADIOLOGY:    < from: Xray Chest 1 View- PORTABLE-Urgent (Xray Chest 1 View- PORTABLE-Urgent .) (01.31.21 @ 12:44) >  IMPRESSION: Patchy right lower lobe pneumonia, slightly worsened.    < end of copied text >  
CC: Patient is a 80y old  Male who presents with a chief complaint of shortness of breath, AMS (01 Feb 2021 15:04)    ID following for COVID PNA    Interval History/ROS: Patient remains on NRB. No fevers, no chills. With elevated WBC today.    Rest of ROS negative.    Allergies  No Known Allergies    ANTIMICROBIALS:  piperacillin/tazobactam IVPB.. 3.375 every 8 hours      OTHER MEDS:  acetaminophen  Suppository .. 650 milliGRAM(s) Rectal every 4 hours PRN  ascorbic acid 500 milliGRAM(s) Oral daily  carvedilol 3.125 milliGRAM(s) Oral every 12 hours  dextrose 5%. 1000 milliLiter(s) IV Continuous <Continuous>  escitalopram 20 milliGRAM(s) Oral daily  finasteride 5 milliGRAM(s) Oral daily  glycopyrrolate Injectable 0.2 milliGRAM(s) IV Push every 6 hours PRN  heparin   Injectable 5000 Unit(s) SubCutaneous every 8 hours  metoprolol tartrate Injectable 5 milliGRAM(s) IV Push every 6 hours PRN  morphine  - Injectable 1 milliGRAM(s) IV Push every 2 hours PRN  OXcarbazepine Suspension 300 milliGRAM(s) Oral two times a day  pantoprazole    Tablet 40 milliGRAM(s) Oral before breakfast  tamsulosin 0.4 milliGRAM(s) Oral at bedtime  zinc sulfate 220 milliGRAM(s) Oral daily      PE:    Vital Signs Last 24 Hrs  T(C): 36.3 (02 Feb 2021 09:43), Max: 36.3 (02 Feb 2021 09:43)  T(F): 97.3 (02 Feb 2021 09:43), Max: 97.3 (02 Feb 2021 09:43)  HR: 72 (02 Feb 2021 09:43) (72 - 72)  BP: 140/46 (02 Feb 2021 09:43) (112/64 - 140/46)  BP(mean): --  RR: 18 (02 Feb 2021 09:43) (16 - 18)  SpO2: 95% (02 Feb 2021 09:43) (88% - 95%)    Gen: Awake, NAD, on NRB  CV: S1+S2 normal, no murmurs  Resp: Clear bilat, no resp distress  Abd: Soft, nontender, +BS  Ext: No LE edema, no wounds  : No Davis  IV/Skin: No thrombophlebitis  Neuro: nonverbal    LABS:                          11.7   22.63 )-----------( 161      ( 01 Feb 2021 15:43 )             37.8     MICROBIOLOGY:  v  .Urine Catheterized  01-29-21   <10,000 CFU/mL Normal Urogenital Lucia  --  --      .Nose  01-28-21   No MRSA isolated  No Staph Aureus (MSSA) isolated "This can represent normal nasal  carriage.  PCR is more sensitive for identifying MRSA/MSSA carriage"  --  --      .Blood Blood-Venous  01-28-21   No Growth Final  --  --      .Blood Blood-Peripheral  01-20-21   No Growth Final  --  --      .Blood Blood-Venous  01-20-21   No Growth Final  --  --    RADIOLOGY:    < from: Xray Chest 1 View- PORTABLE-Urgent (Xray Chest 1 View- PORTABLE-Urgent .) (01.31.21 @ 12:44) >  IMPRESSION: Patchy right lower lobe pneumonia, slightly worsened.    < end of copied text >  
CC: Patient is a 80y old  Male who presents with a chief complaint of shortness of breath, AMS (01 Feb 2021 12:29)    ID following for PNA    Interval History/ROS: Patient remains on NRB. Afebrile. Remains with leukocytosis. Nonverbal.    Rest of ROS negative.    Allergies  No Known Allergies    ANTIMICROBIALS:  piperacillin/tazobactam IVPB.. 3.375 every 8 hours    OTHER MEDS:  acetaminophen  Suppository .. 650 milliGRAM(s) Rectal every 4 hours PRN  ascorbic acid 500 milliGRAM(s) Oral daily  carvedilol 3.125 milliGRAM(s) Oral every 12 hours  dextrose 5%. 1000 milliLiter(s) IV Continuous <Continuous>  escitalopram 20 milliGRAM(s) Oral daily  finasteride 5 milliGRAM(s) Oral daily  heparin   Injectable 5000 Unit(s) SubCutaneous every 8 hours  metoprolol tartrate Injectable 5 milliGRAM(s) IV Push every 6 hours PRN  morphine  - Injectable 1 milliGRAM(s) IV Push every 2 hours PRN  OXcarbazepine Suspension 300 milliGRAM(s) Oral two times a day  pantoprazole    Tablet 40 milliGRAM(s) Oral before breakfast  tamsulosin 0.4 milliGRAM(s) Oral at bedtime  zinc sulfate 220 milliGRAM(s) Oral daily    PE:    Vital Signs Last 24 Hrs  T(C): 36.3 (01 Feb 2021 05:12), Max: 36.8 (31 Jan 2021 21:09)  T(F): 97.4 (01 Feb 2021 05:12), Max: 98.2 (31 Jan 2021 21:09)  HR: 63 (01 Feb 2021 05:12) (63 - 73)  BP: 123/46 (01 Feb 2021 05:12) (120/56 - 123/46)  BP(mean): --  RR: 22 (01 Feb 2021 05:12) (22 - 24)  SpO2: 95% (01 Feb 2021 05:12) (95% - 96%)    Gen: Awake, NAD  CV: S1+S2 normal, no murmurs  Resp: Coarse breath sounds  Abd: Soft, nontender, +BS  Ext: No LE edema, no wounds  : No Davis  IV/Skin: No thrombophlebitis  Neuro: nonverbal    LABS:                          5.4    10.74 )-----------( 71       ( 01 Feb 2021 08:05 )             22.9       01-31    147<H>  |  110<H>  |  25<H>  ----------------------------<  81  3.6   |  27  |  0.90    Ca    8.3<L>      31 Jan 2021 08:00  Phos  2.3     01-31  Mg     2.1     01-31            MICROBIOLOGY:  v  .Urine Catheterized  01-29-21   <10,000 CFU/mL Normal Urogenital Lucia  --  --      .Nose  01-28-21   No MRSA isolated  No Staph Aureus (MSSA) isolated "This can represent normal nasal  carriage.  PCR is more sensitive for identifying MRSA/MSSA carriage"  --  --      .Blood Blood-Venous  01-28-21   No growth to date.  --  --      .Blood Blood-Peripheral  01-20-21   No Growth Final  --  --      .Blood Blood-Venous  01-20-21   No Growth Final  --  --    RADIOLOGY:    < from: Xray Chest 1 View- PORTABLE-Urgent (Xray Chest 1 View- PORTABLE-Urgent .) (01.31.21 @ 12:44) >  IMPRESSION: Patchy right lower lobe pneumonia, slightly worsened.    < end of copied text >  
CC: Patient is a 80y old  Male who presents with a chief complaint of shortness of breath, AMS (02 Feb 2021 13:01)    ID following for PNA    Interval History/ROS: Patient remains nonverbal, no fevers. Remains on NRB.    Rest of ROS negative.    Allergies  No Known Allergies    ANTIMICROBIALS:  piperacillin/tazobactam IVPB.. 3.375 every 8 hours    OTHER MEDS:  acetaminophen  Suppository .. 650 milliGRAM(s) Rectal every 4 hours PRN  ascorbic acid 500 milliGRAM(s) Oral daily  carvedilol 3.125 milliGRAM(s) Oral every 12 hours  dextrose 5%. 1000 milliLiter(s) IV Continuous <Continuous>  escitalopram 20 milliGRAM(s) Oral daily  finasteride 5 milliGRAM(s) Oral daily  glycopyrrolate Injectable 0.2 milliGRAM(s) IV Push every 6 hours PRN  heparin   Injectable 5000 Unit(s) SubCutaneous every 8 hours  metoprolol tartrate Injectable 5 milliGRAM(s) IV Push every 6 hours PRN  morphine  - Injectable 1 milliGRAM(s) IV Push every 2 hours PRN  OXcarbazepine Suspension 300 milliGRAM(s) Oral two times a day  pantoprazole    Tablet 40 milliGRAM(s) Oral before breakfast  tamsulosin 0.4 milliGRAM(s) Oral at bedtime  zinc sulfate 220 milliGRAM(s) Oral daily    PE:    Vital Signs Last 24 Hrs  T(C): 37.1 (03 Feb 2021 04:41), Max: 37.1 (03 Feb 2021 04:41)  T(F): 98.8 (03 Feb 2021 04:41), Max: 98.8 (03 Feb 2021 04:41)  HR: 72 (03 Feb 2021 04:41) (72 - 78)  BP: 152/58 (03 Feb 2021 04:41) (152/58 - 159/78)  BP(mean): --  RR: 18 (03 Feb 2021 04:41) (18 - 18)  SpO2: 98% (03 Feb 2021 04:41) (98% - 100%)    Gen: Awake, NAD, on NRB  CV: S1+S2 normal, no murmurs  Resp: Clear bilat, no resp distress  Abd: Soft, nontender, +BS  Ext: No LE edema, no wounds  : No Davis  IV/Skin: No thrombophlebitis  Neuro: nonverbal    LABS:                          11.7   22.63 )-----------( 161      ( 01 Feb 2021 15:43 )             37.8                   MICROBIOLOGY:  v  .Urine Catheterized  01-29-21   <10,000 CFU/mL Normal Urogenital Lucia  --  --      .Nose  01-28-21   No MRSA isolated  No Staph Aureus (MSSA) isolated "This can represent normal nasal  carriage.  PCR is more sensitive for identifying MRSA/MSSA carriage"  --  --      .Blood Blood-Venous  01-28-21   No Growth Final  --  --      .Blood Blood-Peripheral  01-20-21   No Growth Final  --  --      .Blood Blood-Venous  01-20-21   No Growth Final  --  --    RADIOLOGY:    < from: Xray Chest 1 View- PORTABLE-Urgent (Xray Chest 1 View- PORTABLE-Urgent .) (01.31.21 @ 12:44) >  IMPRESSION: Patchy right lower lobe pneumonia, slightly worsened.    < end of copied text >  
CC: Patient is a 80y old  Male who presents with a chief complaint of shortness of breath, AMS (05 Feb 2021 12:05)    ID following for COVID PNA, aspiration PNA    Interval History/ROS: Patient on 6L NC. Leukocytosis improving.    Rest of ROS negative.    Allergies  No Known Allergies    ANTIMICROBIALS:      OTHER MEDS:  acetaminophen  Suppository .. 650 milliGRAM(s) Rectal every 4 hours PRN  dextrose 5%. 1000 milliLiter(s) IV Continuous <Continuous>  glycopyrrolate Injectable 0.2 milliGRAM(s) IV Push every 4 hours PRN  heparin   Injectable 5000 Unit(s) SubCutaneous every 8 hours  metoprolol tartrate Injectable 5 milliGRAM(s) IV Push every 6 hours PRN  morphine   Solution 2 milliGRAM(s) Oral every 2 hours PRN    PE:    Vital Signs Last 24 Hrs  T(C): 36.7 (05 Feb 2021 10:15), Max: 36.9 (05 Feb 2021 05:06)  T(F): 98.1 (05 Feb 2021 10:15), Max: 98.4 (05 Feb 2021 05:06)  HR: 92 (05 Feb 2021 10:15) (60 - 101)  BP: 130/88 (05 Feb 2021 10:15) (125/53 - 130/88)  BP(mean): --  RR: 20 (05 Feb 2021 10:15) (18 - 20)  SpO2: 100% (05 Feb 2021 10:15) (100% - 100%)    Gen: Awake, NAD, on NC  CV: S1+S2 normal, no murmurs  Resp: Clear bilat, no resp distress  Abd: Soft, nontender, +BS  Ext: No LE edema, no wounds  : No Davis  IV/Skin: No thrombophlebitis  Neuro: nonverbal    LABS:                          12.4   15.14 )-----------( 204      ( 05 Feb 2021 07:30 )             39.0       02-05    136  |  97<L>  |  10  ----------------------------<  112<H>  3.6   |  30  |  0.64    Ca    8.5      05 Feb 2021 07:30  Phos  2.0     02-05  Mg     1.8     02-05    TPro  6.4  /  Alb  2.1<L>  /  TBili  0.6  /  DBili  x   /  AST  17  /  ALT  14  /  AlkPhos  116  02-05          MICROBIOLOGY:  v  .Urine Catheterized  01-29-21   <10,000 CFU/mL Normal Urogenital Lucia  --  --      .Nose  01-28-21   No MRSA isolated  No Staph Aureus (MSSA) isolated "This can represent normal nasal  carriage.  PCR is more sensitive for identifying MRSA/MSSA carriage"  --  --      .Blood Blood-Venous  01-28-21   No Growth Final  --  --      .Blood Blood-Peripheral  01-20-21   No Growth Final  --  --      .Blood Blood-Venous  01-20-21   No Growth Final  --  --    RADIOLOGY:    < from: Xray Chest 1 View- PORTABLE-Urgent (Xray Chest 1 View- PORTABLE-Urgent .) (01.31.21 @ 12:44) >  IMPRESSION: Patchy right lower lobe pneumonia, slightly worsened.    < end of copied text >  
Medicine Progress Note    SSUBJECTIVE / OVERNIGHT EVENTS:    Patient unable to provide any information as he's nonverbal. Doesn't follow simple commands such as blinking eyes when asked.     ADDITIONAL REVIEW OF SYSTEMS:    Unable to ascertain due to his mental status.    MEDICATIONS  (STANDING):  ascorbic acid 500 milliGRAM(s) Oral daily  carvedilol 3.125 milliGRAM(s) Oral every 12 hours  dextrose 5%. 1000 milliLiter(s) (100 mL/Hr) IV Continuous <Continuous>  escitalopram 20 milliGRAM(s) Oral daily  finasteride 5 milliGRAM(s) Oral daily  heparin   Injectable 5000 Unit(s) SubCutaneous every 8 hours  OXcarbazepine Suspension 300 milliGRAM(s) Oral two times a day  pantoprazole    Tablet 40 milliGRAM(s) Oral before breakfast  piperacillin/tazobactam IVPB.. 3.375 Gram(s) IV Intermittent every 8 hours  tamsulosin 0.4 milliGRAM(s) Oral at bedtime  zinc sulfate 220 milliGRAM(s) Oral daily    MEDICATIONS  (PRN):  acetaminophen  Suppository .. 650 milliGRAM(s) Rectal every 4 hours PRN Temp greater or equal to 38.5C (101.3F)  metoprolol tartrate Injectable 5 milliGRAM(s) IV Push every 6 hours PRN tachycardia and hypertension    CAPILLARY BLOOD GLUCOSE        I&O's Summary    30 Jan 2021 07:01  -  31 Jan 2021 07:00  --------------------------------------------------------  IN: 1200 mL / OUT: 0 mL / NET: 1200 mL        PHYSICAL EXAM:  Vital Signs Last 24 Hrs  T(C): 36.7 (31 Jan 2021 04:31), Max: 37 (30 Jan 2021 11:38)  T(F): 98.1 (31 Jan 2021 04:31), Max: 98.6 (30 Jan 2021 11:38)  HR: 68 (31 Jan 2021 04:31) (65 - 77)  BP: 137/52 (31 Jan 2021 04:31) (116/41 - 137/52)  BP(mean): --  RR: 19 (31 Jan 2021 04:31) (18 - 19)  SpO2: 98% (31 Jan 2021 04:31) (98% - 100%)  CONSTITUTIONAL: Nonverbal.  ENMT: Dry oral mucosa.  RESPIRATORY: Coarse bilateral breath sounds. No accessory muscle use.  CARDIOVASCULAR: Regular rate and rhythm, normal S1 and S2, no murmur/rub/gallop; No lower extremity edema;  ABDOMEN: Nontender to palpation, normoactive bowel sounds, no rebound/guarding; No hepatosplenomegaly  PSYCH: nonverbal  NEUROLOGY: unable to partake in neuro exam due to his mental status.       LABS:                        11.9   32.25 )-----------( 168      ( 31 Jan 2021 08:00 )             39.2     01-31    147<H>  |  110<H>  |  25<H>  ----------------------------<  81  3.6   |  27  |  0.90    Ca    8.3<L>      31 Jan 2021 08:00  Phos  2.3     01-31  Mg     2.1     01-31                Culture - Urine (collected 29 Jan 2021 10:37)  Source: .Urine Catheterized  Final Report (30 Jan 2021 10:23):    <10,000 CFU/mL Normal Urogenital Lucia    Culture - Nose (collected 28 Jan 2021 15:27)  Source: .Nose  Final Report (30 Jan 2021 22:31):    No MRSA isolated    No Staph Aureus (MSSA) isolated "This can represent normal nasal    carriage.  PCR is more sensitive for identifying MRSA/MSSA carriage"    Culture - Blood (collected 28 Jan 2021 10:13)  Source: .Blood Blood-Venous  Preliminary Report (29 Jan 2021 11:01):    No growth to date.    Culture - Blood (collected 28 Jan 2021 10:13)  Source: .Blood Blood-Venous  Preliminary Report (29 Jan 2021 11:01):    No growth to date.      COVID-19 PCR: Detected (20 Jan 2021 23:05)      RADIOLOGY & ADDITIONAL TESTS:  Imaging from Last 24 Hours:    Electrocardiogram/QTc Interval:    COORDINATION OF CARE:  Care Discussed with Consultants/Other Providers:  
Patient is a 80y old  Male who presents with a chief complaint of shortness of breath, AMS (2021 16:37)    SUBJECTIVE / OVERNIGHT EVENTS:  patient demented, no acute distress, does not communicate  ADDITIONAL REVIEW OF SYSTEMS:    MEDICATIONS  (STANDING):  carvedilol 3.125 milliGRAM(s) Oral every 12 hours  cefTRIAXone   IVPB 1000 milliGRAM(s) IV Intermittent every 24 hours  dexAMETHasone  Injectable 6 milliGRAM(s) IV Push daily  dextrose 5% + sodium chloride 0.45%. 1000 milliLiter(s) (50 mL/Hr) IV Continuous <Continuous>  escitalopram 20 milliGRAM(s) Oral daily  finasteride 5 milliGRAM(s) Oral daily  heparin   Injectable 5000 Unit(s) SubCutaneous every 8 hours  OXcarbazepine 300 milliGRAM(s) Oral two times a day  pantoprazole    Tablet 40 milliGRAM(s) Oral before breakfast  tamsulosin 0.4 milliGRAM(s) Oral at bedtime    MEDICATIONS  (PRN):  acetaminophen   Tablet .. 650 milliGRAM(s) Oral every 4 hours PRN Temp greater or equal to 38.5C (101.3F)  acetaminophen  Suppository .. 650 milliGRAM(s) Rectal every 4 hours PRN Temp greater or equal to 38.5C (101.3F)    CAPILLARY BLOOD GLUCOSE    POCT Blood Glucose.: 165 mg/dL (2021 12:22)    I&O's Summary    PHYSICAL EXAM:  Vital Signs Last 24 Hrs  T(C): 37.2 (2021 12:46), Max: 37.2 (2021 12:46)  T(F): 99 (2021 12:46), Max: 99 (2021 12:46)  HR: 78 (2021 12:46) (66 - 78)  BP: 149/88 (2021 12:46) (149/88 - 173/61)  BP(mean): 87 (2021 06:36) (87 - 87)  RR: 18 (2021 12:46) (18 - 18)  SpO2: 96% (2021 12:46) (96% - 100%)  CONSTITUTIONAL: NAD,  HEENT: 4L NC  RESPIRATORY: limited exam, diminished  CARDIOVASCULAR: Regular rate and rhythm, normal S1 and S2, No lower extremity edema  ABDOMEN: Nontender to palpation, normoactive bowel sounds  MUSCLOSKELETAL: no clubbing or cyanosis of digits  PSYCH: demented    LABS:                        13.4   9.22  )-----------( 337      ( 2021 10:41 )             43.5         149<H>  |  112<H>  |  67<H>  ----------------------------<  181<H>  5.0   |  26  |  1.17    Ca    9.0      2021 13:34  Phos  4.3       Mg     2.7         TPro  7.8  /  Alb  2.9<L>  /  TBili  0.5  /  DBili  x   /  AST  36  /  ALT  25  /  AlkPhos  106      PT/INR - ( 2021 22:40 )   PT: 14.5 sec;   INR: 1.28 ratio        Urinalysis Basic - ( 2021 22:40 )    Color: Yellow / Appearance: Slightly Turbid / S.031 / pH: x  Gluc: x / Ketone: Negative  / Bili: Negative / Urobili: 3 mg/dL   Blood: x / Protein: 30 mg/dL / Nitrite: Negative   Leuk Esterase: Large / RBC: 0-2 /HPF / WBC >50 /HPF   Sq Epi: x / Non Sq Epi: Few / Bacteria: Moderate        Culture - Blood (collected 2021 08:12)  Source: .Blood Blood-Venous  Preliminary Report (2021 09:01):    No growth to date.    Culture - Blood (collected 2021 08:12)  Source: .Blood Blood-Peripheral  Preliminary Report (2021 09:01):    No growth to date.        RADIOLOGY & ADDITIONAL TESTS:  Results Reviewed:   Imaging Personally Reviewed:  Electrocardiogram Personally Reviewed:    COORDINATION OF CARE:  Care Discussed with Consultants/Other Providers [Y/N]:  Prior or Outpatient Records Reviewed [Y/N]:  
Patient is a 80y old  Male who presents with a chief complaint of shortness of breath, AMS (22 Jan 2021 15:59)    SUBJECTIVE / OVERNIGHT EVENTS:  patient does not give subjective complaints, demented.  ADDITIONAL REVIEW OF SYSTEMS:    MEDICATIONS  (STANDING):  carvedilol 3.125 milliGRAM(s) Oral every 12 hours  cefTRIAXone   IVPB 1000 milliGRAM(s) IV Intermittent every 24 hours  dexAMETHasone  Injectable 6 milliGRAM(s) IV Push daily  dextrose 5%. 1000 milliLiter(s) (55 mL/Hr) IV Continuous <Continuous>  escitalopram 20 milliGRAM(s) Oral daily  finasteride 5 milliGRAM(s) Oral daily  heparin   Injectable 5000 Unit(s) SubCutaneous every 8 hours  OXcarbazepine 300 milliGRAM(s) Oral two times a day  pantoprazole    Tablet 40 milliGRAM(s) Oral before breakfast  tamsulosin 0.4 milliGRAM(s) Oral at bedtime    MEDICATIONS  (PRN):  acetaminophen   Tablet .. 650 milliGRAM(s) Oral every 4 hours PRN Temp greater or equal to 38.5C (101.3F)  acetaminophen  Suppository .. 650 milliGRAM(s) Rectal every 4 hours PRN Temp greater or equal to 38.5C (101.3F)    CAPILLARY BLOOD GLUCOSE    POCT Blood Glucose.: 115 mg/dL (23 Jan 2021 08:36)    I&O's Summary    PHYSICAL EXAM:  Vital Signs Last 24 Hrs  T(C): 37.1 (22 Jan 2021 17:32), Max: 37.1 (22 Jan 2021 17:32)  T(F): 98.8 (22 Jan 2021 17:32), Max: 98.8 (22 Jan 2021 17:32)  HR: 68 (22 Jan 2021 17:32) (68 - 68)  BP: 159/79 (22 Jan 2021 17:32) (159/79 - 159/79)  BP(mean): --  RR: 18 (22 Jan 2021 17:32) (18 - 18)  SpO2: 95% (22 Jan 2021 17:32) (95% - 95%)  CONSTITUTIONAL: demented elderly male  HEENT: on 3L NC  RESPIRATORY: limited exam, diminished  CARDIOVASCULAR: Regular rate and rhythm, normal S1 and S2, No lower extremity edema  ABDOMEN: Nontender to palpation, normoactive bowel sounds  MUSCLOSKELETAL: no clubbing or cyanosis of digits  PSYCH: demented    LABS:                        14.3   15.89 )-----------( 399      ( 23 Jan 2021 09:10 )             46.4     01-23    148<H>  |  110<H>  |  68<H>  ----------------------------<  137<H>  4.2   |  29  |  1.17    Ca    9.2      23 Jan 2021 09:10  Phos  3.3     01-23  Mg     2.6     01-23    TPro  7.5  /  Alb  3.1<L>  /  TBili  0.5  /  DBili  x   /  AST  33  /  ALT  25  /  AlkPhos  103  01-23    Culture - Blood (collected 21 Jan 2021 08:12)  Source: .Blood Blood-Venous  Preliminary Report (22 Jan 2021 09:01):    No growth to date.    Culture - Blood (collected 21 Jan 2021 08:12)  Source: .Blood Blood-Peripheral  Preliminary Report (22 Jan 2021 09:01):    No growth to date.    RADIOLOGY & ADDITIONAL TESTS:  Results Reviewed:   Imaging Personally Reviewed:  Electrocardiogram Personally Reviewed:    COORDINATION OF CARE:  Care Discussed with Consultants/Other Providers [Y/N]:  Prior or Outpatient Records Reviewed [Y/N]:  
Patient is a 80y old  Male who presents with a chief complaint of shortness of breath, AMS (01 Feb 2021 14:37)      SUBJECTIVE / OVERNIGHT EVENTS: Pt examined today and noted to be resting. W/ covid PNA + aspiration PNA being treated. Pt's wife wants him comfortable.    MEDICATIONS  (STANDING):  ascorbic acid 500 milliGRAM(s) Oral daily  carvedilol 3.125 milliGRAM(s) Oral every 12 hours  dextrose 5%. 1000 milliLiter(s) (100 mL/Hr) IV Continuous <Continuous>  escitalopram 20 milliGRAM(s) Oral daily  finasteride 5 milliGRAM(s) Oral daily  heparin   Injectable 5000 Unit(s) SubCutaneous every 8 hours  OXcarbazepine Suspension 300 milliGRAM(s) Oral two times a day  pantoprazole    Tablet 40 milliGRAM(s) Oral before breakfast  piperacillin/tazobactam IVPB.. 3.375 Gram(s) IV Intermittent every 8 hours  tamsulosin 0.4 milliGRAM(s) Oral at bedtime  zinc sulfate 220 milliGRAM(s) Oral daily    MEDICATIONS  (PRN):  acetaminophen  Suppository .. 650 milliGRAM(s) Rectal every 4 hours PRN Temp greater or equal to 38.5C (101.3F)  metoprolol tartrate Injectable 5 milliGRAM(s) IV Push every 6 hours PRN tachycardia and hypertension  morphine  - Injectable 1 milliGRAM(s) IV Push every 2 hours PRN respiratory distress, dyspnea      PHYSICAL EXAM:  Vital Signs Last 24 Hrs  T(C): 36.3 (01 Feb 2021 05:12), Max: 36.8 (31 Jan 2021 21:09)  T(F): 97.4 (01 Feb 2021 05:12), Max: 98.2 (31 Jan 2021 21:09)  HR: 63 (01 Feb 2021 05:12) (63 - 73)  BP: 123/46 (01 Feb 2021 05:12) (120/56 - 123/46)  BP(mean): --  RR: 22 (01 Feb 2021 05:12) (22 - 24)  SpO2: 95% (01 Feb 2021 05:12) (95% - 96%)    CONSTITUTIONAL: NAD  RESPIRATORY: rhonchi + crackles basilar to auscultation bilaterally  CARDIOVASCULAR: Regular rate and rhythm, normal S1 and S2, no murmur/rub/gallop; No lower extremity edema  NEUROLOGY: non-focal; no gross sensory deficits   PSYCH: A+O to person      LABS:                        5.4    10.74 )-----------( 71       ( 01 Feb 2021 08:05 )             22.9     01-31    147<H>  |  110<H>  |  25<H>  ----------------------------<  81  3.6   |  27  |  0.90    Ca    8.3<L>      31 Jan 2021 08:00  Phos  2.3     01-31  Mg     2.1     01-31                  RADIOLOGY & ADDITIONAL TESTS:  Results Reviewed:   Imaging Personally Reviewed:  Electrocardiogram Personally Reviewed:    COORDINATION OF CARE:  Care Discussed with Consultants/Other Providers [Y/N]:  Prior or Outpatient Records Reviewed [Y/N]:  
Patient is a 80y old  Male who presents with a chief complaint of shortness of breath, AMS (05 Feb 2021 14:00)      SUBJECTIVE / OVERNIGHT EVENTS: Pt's chart reviewed. No events as per nursing overnight. Awaits home hospice on 2/6/2021.    MEDICATIONS  (STANDING):  dextrose 5%. 1000 milliLiter(s) (50 mL/Hr) IV Continuous <Continuous>  heparin   Injectable 5000 Unit(s) SubCutaneous every 8 hours    MEDICATIONS  (PRN):  acetaminophen  Suppository .. 650 milliGRAM(s) Rectal every 4 hours PRN Temp greater or equal to 38.5C (101.3F)  glycopyrrolate Injectable 0.2 milliGRAM(s) IV Push every 4 hours PRN Desat  metoprolol tartrate Injectable 5 milliGRAM(s) IV Push every 6 hours PRN tachycardia and hypertension  morphine   Solution 2 milliGRAM(s) Oral every 2 hours PRN dyspnea or pain      PHYSICAL EXAM:  Vital Signs Last 24 Hrs  T(C): 36.8 (06 Feb 2021 04:37), Max: 36.8 (05 Feb 2021 20:51)  T(F): 98.2 (06 Feb 2021 04:37), Max: 98.3 (05 Feb 2021 20:51)  HR: 61 (06 Feb 2021 04:37) (61 - 80)  BP: 145/67 (06 Feb 2021 04:37) (132/59 - 145/67)  BP(mean): --  RR: 17 (06 Feb 2021 10:04) (17 - 20)  SpO2: 100% (06 Feb 2021 10:04) (100% - 100%)    CONSTITUTIONAL: NAD  RESPIRATORY: rhonchi + crackles basilar to auscultation bilaterally  CARDIOVASCULAR: Regular rate and rhythm, normal S1 and S2, no murmur/rub/gallop; No lower extremity edema  NEUROLOGY: non-focal; no gross sensory deficits   PSYCH: A+O to person    LABS:                        12.4   15.14 )-----------( 204      ( 05 Feb 2021 07:30 )             39.0     02-05    136  |  97<L>  |  10  ----------------------------<  112<H>  3.6   |  30  |  0.64    Ca    8.5      05 Feb 2021 07:30  Phos  2.0     02-05  Mg     1.8     02-05    TPro  6.4  /  Alb  2.1<L>  /  TBili  0.6  /  DBili  x   /  AST  17  /  ALT  14  /  AlkPhos  116  02-05                RADIOLOGY & ADDITIONAL TESTS:  Results Reviewed: y  Imaging Personally Reviewed: y  Electrocardiogram Personally Reviewed: n    COORDINATION OF CARE:  Care Discussed with Consultants/Other Providers [Y/N]:  Prior or Outpatient Records Reviewed [Y/N]:  
Medicine Progress Note    SUBJECTIVE / OVERNIGHT EVENTS:    Patient is unable to provide any subjective information as he's nonverbal.    ADDITIONAL REVIEW OF SYSTEMS:  unable to obtain ROS due to him being nonverbal.      MEDICATIONS  (STANDING):  ascorbic acid 500 milliGRAM(s) Oral daily  carvedilol 3.125 milliGRAM(s) Oral every 12 hours  cefTRIAXone   IVPB 1000 milliGRAM(s) IV Intermittent every 24 hours  dexAMETHasone  Injectable 6 milliGRAM(s) IV Push daily  dextrose 5%. 1000 milliLiter(s) (75 mL/Hr) IV Continuous <Continuous>  escitalopram 20 milliGRAM(s) Oral daily  finasteride 5 milliGRAM(s) Oral daily  heparin   Injectable 5000 Unit(s) SubCutaneous every 8 hours  OXcarbazepine 300 milliGRAM(s) Oral two times a day  pantoprazole    Tablet 40 milliGRAM(s) Oral before breakfast  tamsulosin 0.4 milliGRAM(s) Oral at bedtime  zinc sulfate 220 milliGRAM(s) Oral daily    MEDICATIONS  (PRN):  acetaminophen   Tablet .. 650 milliGRAM(s) Oral every 4 hours PRN Temp greater or equal to 38.5C (101.3F)  acetaminophen  Suppository .. 650 milliGRAM(s) Rectal every 4 hours PRN Temp greater or equal to 38.5C (101.3F)    CAPILLARY BLOOD GLUCOSE      POCT Blood Glucose.: 124 mg/dL (24 Jan 2021 22:01)    I&O's Summary    24 Jan 2021 07:01  -  25 Jan 2021 07:00  --------------------------------------------------------  IN: 0 mL / OUT: 650 mL / NET: -650 mL        PHYSICAL EXAM:  Vital Signs Last 24 Hrs  T(C): 36.5 (25 Jan 2021 10:05), Max: 36.8 (25 Jan 2021 01:57)  T(F): 97.7 (25 Jan 2021 10:05), Max: 98.2 (25 Jan 2021 01:57)  HR: 82 (25 Jan 2021 10:05) (77 - 82)  BP: 136/82 (25 Jan 2021 10:05) (127/87 - 152/88)  BP(mean): --  RR: 20 (25 Jan 2021 10:05) (17 - 20)  SpO2: 95% (25 Jan 2021 10:05) (95% - 100%)  CONSTITUTIONAL: nonverbal  ENMT no pharyngeal injection or exudates  RESPIRATORY: no accessory muscle use; coarse bilateral breath sounds  CARDIOVASCULAR: Regular rate and rhythm, normal S1 and S2, no murmur/rub/gallop; No lower extremity edema; Peripheral pulses are 2+ bilaterally  ABDOMEN: Nontender to palpation, normoactive bowel sounds, no rebound/guarding; No hepatosplenomegaly  PSYCH: has baseline dementia  NEUROLOGY: unable to partake in neuro exam due to mental status  SKIN: No rashes; no palpable lesions    LABS:                        14.2   12.00 )-----------( 323      ( 25 Jan 2021 05:17 )             47.0     01-25    146<H>  |  110<H>  |  58<H>  ----------------------------<  154<H>  4.2   |  28  |  1.02    Ca    9.1      25 Jan 2021 05:17  Mg     2.5     01-25    TPro  7.0  /  Alb  2.7<L>  /  TBili  0.6  /  DBili  x   /  AST  39  /  ALT  41  /  AlkPhos  111  01-25              COVID-19 PCR: Detected (20 Jan 2021 23:05)      RADIOLOGY & ADDITIONAL TESTS:  Imaging from Last 24 Hours:    Electrocardiogram/QTc Interval:    COORDINATION OF CARE:  Care Discussed with Consultants/Other Providers:  
Patient is a 80y old  Male who presents with a chief complaint of shortness of breath, AMS (2021 01:37)    SUBJECTIVE / OVERNIGHT EVENTS: unable to give subjective complaints. demented, does not appear in respiratory distress  ADDITIONAL REVIEW OF SYSTEMS:    MEDICATIONS  (STANDING):  carvedilol 3.125 milliGRAM(s) Oral every 12 hours  cefTRIAXone   IVPB 1000 milliGRAM(s) IV Intermittent every 24 hours  dexAMETHasone  Injectable 6 milliGRAM(s) IV Push daily  escitalopram 20 milliGRAM(s) Oral daily  finasteride 5 milliGRAM(s) Oral daily  heparin   Injectable 5000 Unit(s) SubCutaneous every 8 hours  OXcarbazepine 300 milliGRAM(s) Oral two times a day  pantoprazole    Tablet 40 milliGRAM(s) Oral before breakfast  sodium chloride 0.9%. 1000 milliLiter(s) (50 mL/Hr) IV Continuous <Continuous>  tamsulosin 0.4 milliGRAM(s) Oral at bedtime    MEDICATIONS  (PRN):  acetaminophen   Tablet .. 650 milliGRAM(s) Oral every 4 hours PRN Temp greater or equal to 38.5C (101.3F)  acetaminophen  Suppository .. 650 milliGRAM(s) Rectal every 4 hours PRN Temp greater or equal to 38.5C (101.3F)    CAPILLARY BLOOD GLUCOSE    I&O's Summary    PHYSICAL EXAM:  Vital Signs Last 24 Hrs  T(C): 36.9 (2021 10:20), Max: 37.8 (2021 22:00)  T(F): 98.4 (2021 10:20), Max: 100.1 (2021 22:00)  HR: 75 (2021 10:20) (65 - 81)  BP: 166/84 (2021 10:20) (148/85 - 170/71)  BP(mean): --  RR: 18 (2021 10:20) (18 - 26)  SpO2: 96% (2021 10:20) (96% - 98%)  CONSTITUTIONAL: demented elderly male,   HEENT: nasal cannula  RESPIRATORY: limited exam   CARDIOVASCULAR: Regular rate and rhythm, normal S1 and S2, No lower extremity edema  ABDOMEN: Nontender to palpation, normoactive bowel sounds  MUSCLOSKELETAL: no clubbing or cyanosis of digits  PSYCH: A+O to person, place, and time; affect appropriate    LABS:                        13.4   9.22  )-----------( 337      ( 2021 10:41 )             43.5         147<H>  |  110<H>  |  53<H>  ----------------------------<  139<H>  5.3   |  26  |  1.35<H>    Ca    8.9      2021 10:41  Phos  4.6       Mg     2.6         TPro  7.4  /  Alb  2.9<L>  /  TBili  0.4  /  DBili  x   /  AST  35  /  ALT  22  /  AlkPhos  102      PT/INR - ( 2021 22:40 )   PT: 14.5 sec;   INR: 1.28 ratio      Urinalysis Basic - ( 2021 22:40 )    Color: Yellow / Appearance: Slightly Turbid / S.031 / pH: x  Gluc: x / Ketone: Negative  / Bili: Negative / Urobili: 3 mg/dL   Blood: x / Protein: 30 mg/dL / Nitrite: Negative   Leuk Esterase: Large / RBC: 0-2 /HPF / WBC >50 /HPF   Sq Epi: x / Non Sq Epi: Few / Bacteria: Moderate    RADIOLOGY & ADDITIONAL TESTS:  Results Reviewed:   Imaging Personally Reviewed:  Electrocardiogram Personally Reviewed:    COORDINATION OF CARE:  Care Discussed with Consultants/Other Providers [Y/N]:  Prior or Outpatient Records Reviewed [Y/N]:  
Medicine Progress Note    SUBJECTIVE / OVERNIGHT EVENTS:    Patient nonverbal at this time and unable to provide any meaningful clinical history      ADDITIONAL REVIEW OF SYSTEMS:    Unable to obtain additional ROS due to nonverbal mental status    MEDICATIONS  (STANDING):  ascorbic acid 500 milliGRAM(s) Oral daily  carvedilol 3.125 milliGRAM(s) Oral every 12 hours  cefTRIAXone   IVPB 1000 milliGRAM(s) IV Intermittent every 24 hours  dexAMETHasone  Injectable 6 milliGRAM(s) IV Push daily  dextrose 5%. 1000 milliLiter(s) (75 mL/Hr) IV Continuous <Continuous>  escitalopram 20 milliGRAM(s) Oral daily  finasteride 5 milliGRAM(s) Oral daily  heparin   Injectable 5000 Unit(s) SubCutaneous every 8 hours  OXcarbazepine 300 milliGRAM(s) Oral two times a day  pantoprazole    Tablet 40 milliGRAM(s) Oral before breakfast  tamsulosin 0.4 milliGRAM(s) Oral at bedtime  zinc sulfate 220 milliGRAM(s) Oral daily    MEDICATIONS  (PRN):  acetaminophen   Tablet .. 650 milliGRAM(s) Oral every 4 hours PRN Temp greater or equal to 38.5C (101.3F)  acetaminophen  Suppository .. 650 milliGRAM(s) Rectal every 4 hours PRN Temp greater or equal to 38.5C (101.3F)    CAPILLARY BLOOD GLUCOSE      POCT Blood Glucose.: 105 mg/dL (26 Jan 2021 12:13)    I&O's Summary    26 Jan 2021 07:01  -  26 Jan 2021 13:48  --------------------------------------------------------  IN: 0 mL / OUT: 250 mL / NET: -250 mL        PHYSICAL EXAM:  Vital Signs Last 24 Hrs  T(C): 36.9 (26 Jan 2021 12:06), Max: 36.9 (26 Jan 2021 12:06)  T(F): 98.4 (26 Jan 2021 12:06), Max: 98.4 (26 Jan 2021 12:06)  HR: 87 (26 Jan 2021 12:06) (85 - 92)  BP: 130/75 (26 Jan 2021 12:06) (113/70 - 132/80)  BP(mean): --  RR: 18 (26 Jan 2021 12:06) (18 - 18)  SpO2: 98% (26 Jan 2021 12:06) (92% - 98%)  CONSTITUTIONAL: nonverbal  ENMT: no pharyngeal injection or exudates  RESPIRATORY: No accessory muscle use; coarse bilateral breath sounds  CARDIOVASCULAR: Regular rate and rhythm, normal S1 and S2, no murmur/rub/gallop; No lower extremity edema; Peripheral pulses are 2+ bilaterally  ABDOMEN: Nontender to palpation, normoactive bowel sounds, no rebound/guarding; No hepatosplenomegaly  PSYCH: Nonverbal  NEUROLOGY: Unable to partake in neurological exam. Noted to have some tremors at rest  SKIN: No rashes; no palpable lesions    LABS:                        15.8   15.08 )-----------( 298      ( 26 Jan 2021 08:22 )             52.7     01-26    152<H>  |  114<H>  |  53<H>  ----------------------------<  124<H>  4.3   |  24  |  0.95    Ca    9.5      26 Jan 2021 08:22  Mg     2.5     01-25    TPro  7.0  /  Alb  2.7<L>  /  TBili  0.6  /  DBili  x   /  AST  39  /  ALT  41  /  AlkPhos  111  01-25              COVID-19 PCR: Detected (20 Jan 2021 23:05)      RADIOLOGY & ADDITIONAL TESTS:  Imaging from Last 24 Hours:    Electrocardiogram/QTc Interval:    COORDINATION OF CARE:  Care Discussed with Consultants/Other Providers:  
Medicine Progress Note    SUBJECTIVE / OVERNIGHT EVENTS:    Patient nonverbal and unable to provide any clinical information    ADDITIONAL REVIEW OF SYSTEMS:    Unable to ascertain due to nonverbal mental status.    MEDICATIONS  (STANDING):  ascorbic acid 500 milliGRAM(s) Oral daily  carvedilol 3.125 milliGRAM(s) Oral every 12 hours  dextrose 5%. 1000 milliLiter(s) (100 mL/Hr) IV Continuous <Continuous>  escitalopram 20 milliGRAM(s) Oral daily  finasteride 5 milliGRAM(s) Oral daily  heparin   Injectable 5000 Unit(s) SubCutaneous every 8 hours  OXcarbazepine Suspension 300 milliGRAM(s) Oral two times a day  pantoprazole    Tablet 40 milliGRAM(s) Oral before breakfast  piperacillin/tazobactam IVPB.. 3.375 Gram(s) IV Intermittent every 8 hours  tamsulosin 0.4 milliGRAM(s) Oral at bedtime  zinc sulfate 220 milliGRAM(s) Oral daily    MEDICATIONS  (PRN):  acetaminophen  Suppository .. 650 milliGRAM(s) Rectal every 4 hours PRN Temp greater or equal to 38.5C (101.3F)  metoprolol tartrate Injectable 5 milliGRAM(s) IV Push every 6 hours PRN tachycardia and hypertension    CAPILLARY BLOOD GLUCOSE      POCT Blood Glucose.: 140 mg/dL (2021 17:08)  POCT Blood Glucose.: 100 mg/dL (2021 12:02)    I&O's Summary    2021 07:01  -  2021 07:00  --------------------------------------------------------  IN: 0 mL / OUT: 1000 mL / NET: -1000 mL        PHYSICAL EXAM:  Vital Signs Last 24 Hrs  T(C): 36.6 (2021 05:48), Max: 36.9 (2021 12:59)  T(F): 97.8 (2021 05:48), Max: 98.4 (2021 12:59)  HR: 92 (2021 05:48) (63 - 166)  BP: 114/60 (2021 05:48) (114/60 - 149/77)  BP(mean): --  RR: 18 (2021 05:48) (18 - 20)  SpO2: 96% (2021 05:48) (96% - 99%)  CONSTITUTIONAL: Nonverbal  ENMT:  dry oral mucosa  RESPIRATORY: coarse bilateral breath sounds; not using accessory muscles.  CARDIOVASCULAR: No JVD. Normal S1 and S2. RRR.  ABDOMEN: Soft. No clear tenderness to palpation. Nondistended.  PSYCH: nonverbal.  NEUROLOGY: Unable to partake in neurological exam due to nonverbal status      LABS:                        13.0   25.89 )-----------( 211      ( 2021 08:43 )             43.8         157<H>  |  120<H>  |  39<H>  ----------------------------<  68<L>  4.1   |  25  |  1.14    Ca    8.9      2021 08:43  Phos  3.0       Mg     2.8                 Urinalysis Basic - ( 2021 08:43 )    Color: Yellow / Appearance: Slightly Turbid / S.022 / pH: x  Gluc: x / Ketone: Negative  / Bili: Negative / Urobili: <2 mg/dL   Blood: x / Protein: Trace / Nitrite: Negative   Leuk Esterase: Negative / RBC: 9 /HPF / WBC 2 /HPF   Sq Epi: x / Non Sq Epi: 1 /HPF / Bacteria: Negative        Culture - Nose (collected 2021 21:27)  Source: .Nose  Preliminary Report (2021 16:01):    Culture in progress    Culture - Blood (collected 2021 10:13)  Source: .Blood Blood-Venous  Preliminary Report (2021 11:01):    No growth to date.    Culture - Blood (collected 2021 10:13)  Source: .Blood Blood-Venous  Preliminary Report (2021 11:01):    No growth to date.      COVID-19 PCR: Detected (2021 23:05)      RADIOLOGY & ADDITIONAL TESTS:  Imaging from Last 24 Hours:    Electrocardiogram/QTc Interval:    COORDINATION OF CARE:  Care Discussed with Consultants/Other Providers:  
Patient is a 80y old  Male who presents with a chief complaint of shortness of breath, AMS (04 Feb 2021 10:13)      SUBJECTIVE / OVERNIGHT EVENTS: Pt awake. Pt's chart reviewed. Noted arrangements for home/hospice for a likely Sunday 7, 2021 transfer. On 6LPM NC @95%.    MEDICATIONS  (STANDING):  dextrose 5%. 1000 milliLiter(s) (50 mL/Hr) IV Continuous <Continuous>  heparin   Injectable 5000 Unit(s) SubCutaneous every 8 hours  piperacillin/tazobactam IVPB.. 3.375 Gram(s) IV Intermittent every 8 hours    MEDICATIONS  (PRN):  acetaminophen  Suppository .. 650 milliGRAM(s) Rectal every 4 hours PRN Temp greater or equal to 38.5C (101.3F)  glycopyrrolate Injectable 0.2 milliGRAM(s) IV Push every 4 hours PRN Desat  metoprolol tartrate Injectable 5 milliGRAM(s) IV Push every 6 hours PRN tachycardia and hypertension  morphine   Solution 2 milliGRAM(s) Oral every 2 hours PRN dyspnea or pain      PHYSICAL EXAM:  Vital Signs Last 24 Hrs  T(C): 36.6 (04 Feb 2021 09:22), Max: 36.6 (03 Feb 2021 21:59)  T(F): 97.8 (04 Feb 2021 09:22), Max: 97.8 (03 Feb 2021 21:59)  HR: 72 (04 Feb 2021 09:22) (72 - 82)  BP: 133/58 (04 Feb 2021 09:22) (133/58 - 139/66)  BP(mean): --  RR: 20 (03 Feb 2021 21:59) (20 - 20)  SpO2: 95% (04 Feb 2021 09:22) (95% - 96%)    CONSTITUTIONAL: NAD  RESPIRATORY: rhonchi + crackles basilar to auscultation bilaterally  CARDIOVASCULAR: Regular rate and rhythm, normal S1 and S2, no murmur/rub/gallop; No lower extremity edema  NEUROLOGY: non-focal; no gross sensory deficits   PSYCH: A+O to person       RADIOLOGY & ADDITIONAL TESTS:  Results Reviewed: y  Imaging Personally Reviewed: y  Electrocardiogram Personally Reviewed: n    COORDINATION OF CARE:  Care Discussed with Consultants/Other Providers [Y/N]:  Prior or Outpatient Records Reviewed [Y/N]:  
Patient is a 80y old  Male who presents with a chief complaint of shortness of breath, AMS (04 Feb 2021 16:01)      SUBJECTIVE / OVERNIGHT EVENTS: Examined Pt at the bedside today. Awaits d/c to home w/ hospice as per his wife 2/7/2021.    MEDICATIONS  (STANDING):  dextrose 5%. 1000 milliLiter(s) (50 mL/Hr) IV Continuous <Continuous>  heparin   Injectable 5000 Unit(s) SubCutaneous every 8 hours  potassium phosphate IVPB 15 milliMole(s) IV Intermittent once    MEDICATIONS  (PRN):  acetaminophen  Suppository .. 650 milliGRAM(s) Rectal every 4 hours PRN Temp greater or equal to 38.5C (101.3F)  glycopyrrolate Injectable 0.2 milliGRAM(s) IV Push every 4 hours PRN Desat  metoprolol tartrate Injectable 5 milliGRAM(s) IV Push every 6 hours PRN tachycardia and hypertension  morphine   Solution 2 milliGRAM(s) Oral every 2 hours PRN dyspnea or pain      PHYSICAL EXAM:  Vital Signs Last 24 Hrs  T(C): 36.7 (05 Feb 2021 10:15), Max: 36.9 (05 Feb 2021 05:06)  T(F): 98.1 (05 Feb 2021 10:15), Max: 98.4 (05 Feb 2021 05:06)  HR: 92 (05 Feb 2021 10:15) (60 - 101)  BP: 130/88 (05 Feb 2021 10:15) (125/53 - 130/88)  BP(mean): --  RR: 20 (05 Feb 2021 10:15) (18 - 20)  SpO2: 100% (05 Feb 2021 10:15) (100% - 100%)    CONSTITUTIONAL: NAD, well-developed, well-groomed  RESPIRATORY: Normal respiratory effort; lungs are clear to auscultation bilaterally  CARDIOVASCULAR: Regular rate and rhythm, normal S1 and S2, no murmur/rub/gallop; No lower extremity edema  GASTROINTESTINAL: Nontender to palpation, normoactive bowel sounds, no rebound/guarding; No hepatosplenomegaly  MUSCULOSKELETAL:  no clubbing or cyanosis of digits; no joint swelling or tenderness to palpation  NEUROLOGY: non-focal; no gross sensory deficits   PSYCH: A+O to person, place, and time; affect appropriate  SKIN: No rashes; warm     LABS:                        12.4   15.14 )-----------( 204      ( 05 Feb 2021 07:30 )             39.0     02-05    136  |  97<L>  |  10  ----------------------------<  112<H>  3.6   |  30  |  0.64    Ca    8.5      05 Feb 2021 07:30  Phos  2.0     02-05  Mg     1.8     02-05    TPro  6.4  /  Alb  2.1<L>  /  TBili  0.6  /  DBili  x   /  AST  17  /  ALT  14  /  AlkPhos  116  02-05                RADIOLOGY & ADDITIONAL TESTS:  Results Reviewed:   Imaging Personally Reviewed:  Electrocardiogram Personally Reviewed:    COORDINATION OF CARE:  Care Discussed with Consultants/Other Providers [Y/N]:  Prior or Outpatient Records Reviewed [Y/N]:  
Medicine Progress Note    Patient nonverbal and unable to provide any clinical history. His wife had been hospitalized here as well but is being discharged today. I discussed his care/case with her over the phone today.    SUBJECTIVE / OVERNIGHT EVENTS:    Unable to ascertain due to his nonverbal mental status.    ADDITIONAL REVIEW OF SYSTEMS:    Unable to ascertain due to his nonverbal mental status.    MEDICATIONS  (STANDING):  ascorbic acid 500 milliGRAM(s) Oral daily  carvedilol 3.125 milliGRAM(s) Oral every 12 hours  cefTRIAXone   IVPB 1000 milliGRAM(s) IV Intermittent every 24 hours  dexAMETHasone  Injectable 6 milliGRAM(s) IV Push daily  dextrose 5% + lactated ringers. 1000 milliLiter(s) (75 mL/Hr) IV Continuous <Continuous>  escitalopram 20 milliGRAM(s) Oral daily  finasteride 5 milliGRAM(s) Oral daily  heparin   Injectable 5000 Unit(s) SubCutaneous every 8 hours  OXcarbazepine 300 milliGRAM(s) Oral two times a day  pantoprazole    Tablet 40 milliGRAM(s) Oral before breakfast  tamsulosin 0.4 milliGRAM(s) Oral at bedtime  zinc sulfate 220 milliGRAM(s) Oral daily    MEDICATIONS  (PRN):  acetaminophen  Suppository .. 650 milliGRAM(s) Rectal every 4 hours PRN Temp greater or equal to 38.5C (101.3F)    CAPILLARY BLOOD GLUCOSE      POCT Blood Glucose.: 163 mg/dL (27 Jan 2021 08:23)    I&O's Summary    26 Jan 2021 07:01  -  27 Jan 2021 07:00  --------------------------------------------------------  IN: 0 mL / OUT: 250 mL / NET: -250 mL        PHYSICAL EXAM:  Vital Signs Last 24 Hrs  T(C): 36.5 (26 Jan 2021 20:40), Max: 36.5 (26 Jan 2021 20:40)  T(F): 97.7 (26 Jan 2021 20:40), Max: 97.7 (26 Jan 2021 20:40)  HR: 94 (27 Jan 2021 06:12) (84 - 95)  BP: 130/80 (27 Jan 2021 06:12) (130/80 - 149/90)  BP(mean): --  RR: 18 (27 Jan 2021 06:12) (18 - 20)  SpO2: 95% (27 Jan 2021 06:12) (93% - 95%)  CONSTITUTIONAL: nonverbal  ENMT: No pharyngeal exudates or erythema  RESPIRATORY: No accessory muscle use; coarse bilateral breath sounds.  CARDIOVASCULAR: Regular rate and rhythm, normal S1 and S2, no murmur/rub/gallop; No lower extremity edema; Peripheral pulses are 2+ bilaterally  ABDOMEN: Nontender to palpation, normoactive bowel sounds, no rebound/guarding; No hepatosplenomegaly  PSYCH: nonverbal  NEUROLOGY: unable to partake in neurological examination  SKIN: No rashes; no palpable lesions    LABS:                        15.2   33.36 )-----------( 311      ( 27 Jan 2021 07:24 )             51.4     01-27    155<H>  |  115<H>  |  60<H>  ----------------------------<  189<H>  4.4   |  27  |  1.20    Ca    9.1      27 Jan 2021 07:24                COVID-19 PCR: Detected (20 Jan 2021 23:05)      RADIOLOGY & ADDITIONAL TESTS:  Imaging from Last 24 Hours:    Electrocardiogram/QTc Interval:    COORDINATION OF CARE:  Care Discussed with Consultants/Other Providers:  
Medicine Progress Note    SUBJECTIVE / OVERNIGHT EVENTS:    Patient non-verbal. Unable to provide any subjective information. Case was discussed with his wife via the phone both yesterday and today.    ADDITIONAL REVIEW OF SYSTEMS:    Unable to obtain any ROS due to his nonverbal mental status.    MEDICATIONS  (STANDING):  ascorbic acid 500 milliGRAM(s) Oral daily  carvedilol 3.125 milliGRAM(s) Oral every 12 hours  dexAMETHasone  Injectable 6 milliGRAM(s) IV Push daily  dextrose 5%. 1000 milliLiter(s) (100 mL/Hr) IV Continuous <Continuous>  escitalopram 20 milliGRAM(s) Oral daily  finasteride 5 milliGRAM(s) Oral daily  heparin   Injectable 5000 Unit(s) SubCutaneous every 8 hours  OXcarbazepine 300 milliGRAM(s) Oral two times a day  pantoprazole    Tablet 40 milliGRAM(s) Oral before breakfast  piperacillin/tazobactam IVPB.. 3.375 Gram(s) IV Intermittent every 8 hours  tamsulosin 0.4 milliGRAM(s) Oral at bedtime  zinc sulfate 220 milliGRAM(s) Oral daily    MEDICATIONS  (PRN):  acetaminophen  Suppository .. 650 milliGRAM(s) Rectal every 4 hours PRN Temp greater or equal to 38.5C (101.3F)    CAPILLARY BLOOD GLUCOSE      POCT Blood Glucose.: 171 mg/dL (28 Jan 2021 11:42)    I&O's Summary      PHYSICAL EXAM:  Vital Signs Last 24 Hrs  T(C): 36.8 (28 Jan 2021 05:37), Max: 36.8 (28 Jan 2021 05:37)  T(F): 98.3 (28 Jan 2021 05:37), Max: 98.3 (28 Jan 2021 05:37)  HR: 88 (28 Jan 2021 05:37) (84 - 88)  BP: 115/74 (28 Jan 2021 05:37) (115/74 - 150/82)  BP(mean): --  RR: 20 (28 Jan 2021 05:37) (20 - 20)  SpO2: 94% (28 Jan 2021 05:37) (94% - 94%)  CONSTITUTIONAL: nonverbal  ENMT: no pharyngeal injection or exudates;   RESPIRATORY: Not using accessory muscles. Congested rhonchii heard in both lung fields.  CARDIOVASCULAR: Regular rate and rhythm, normal S1 and S2, no murmur/rub/gallop; No lower extremity edema;  ABDOMEN: Nontender to palpation, normoactive bowel sounds, no rebound/guarding; No hepatosplenomegaly  PSYCH: Nonverbal  NEUROLOGY: Unable to partake in neurological exam due to his mental status  SKIN: No rashes; no palpable lesions    LABS:                        14.2   33.52 )-----------( 271      ( 28 Jan 2021 08:12 )             47.8     01-28    158<H>  |  119<H>  |  48<H>  ----------------------------<  129<H>  3.9   |  26  |  0.98    Ca    9.2      28 Jan 2021 08:12  Phos  2.3     01-28  Mg     2.4     01-28                COVID-19 PCR: Detected (20 Jan 2021 23:05)      RADIOLOGY & ADDITIONAL TESTS:  Imaging from Last 24 Hours:    Electrocardiogram/QTc Interval:    COORDINATION OF CARE:  Care Discussed with Consultants/Other Providers:  
Patient is a 80y old  Male who presents with a chief complaint of shortness of breath, AMS (01 Feb 2021 15:04)      SUBJECTIVE / OVERNIGHT EVENTS: Pt examined at the bedside today. Appears more awake today opening his eyes and is nonverbal. A discussion w/ the Pt's wife essentially concluded and reiterated her desire for palliative measures regarding the Pt. She stated that she does not want any feeding tubes/ventilator placed on her  (Pt) and said that she would like him to return home.    MEDICATIONS  (STANDING):  ascorbic acid 500 milliGRAM(s) Oral daily  carvedilol 3.125 milliGRAM(s) Oral every 12 hours  dextrose 5%. 1000 milliLiter(s) (50 mL/Hr) IV Continuous <Continuous>  escitalopram 20 milliGRAM(s) Oral daily  finasteride 5 milliGRAM(s) Oral daily  heparin   Injectable 5000 Unit(s) SubCutaneous every 8 hours  OXcarbazepine Suspension 300 milliGRAM(s) Oral two times a day  pantoprazole    Tablet 40 milliGRAM(s) Oral before breakfast  piperacillin/tazobactam IVPB.. 3.375 Gram(s) IV Intermittent every 8 hours  tamsulosin 0.4 milliGRAM(s) Oral at bedtime  zinc sulfate 220 milliGRAM(s) Oral daily    MEDICATIONS  (PRN):  acetaminophen  Suppository .. 650 milliGRAM(s) Rectal every 4 hours PRN Temp greater or equal to 38.5C (101.3F)  glycopyrrolate Injectable 0.2 milliGRAM(s) IV Push every 6 hours PRN increased secretions  metoprolol tartrate Injectable 5 milliGRAM(s) IV Push every 6 hours PRN tachycardia and hypertension  morphine  - Injectable 1 milliGRAM(s) IV Push every 2 hours PRN respiratory distress, dyspnea      PHYSICAL EXAM:  Vital Signs Last 24 Hrs  T(C): 36.3 (02 Feb 2021 09:43), Max: 36.3 (02 Feb 2021 09:43)  T(F): 97.3 (02 Feb 2021 09:43), Max: 97.3 (02 Feb 2021 09:43)  HR: 72 (02 Feb 2021 09:43) (72 - 72)  BP: 140/46 (02 Feb 2021 09:43) (112/64 - 140/46)  BP(mean): --  RR: 18 (02 Feb 2021 09:43) (16 - 18)  SpO2: 95% (02 Feb 2021 09:43) (88% - 95%)    CONSTITUTIONAL: NAD  RESPIRATORY: rhonchi + crackles basilar to auscultation bilaterally  CARDIOVASCULAR: Regular rate and rhythm, normal S1 and S2, no murmur/rub/gallop; No lower extremity edema  NEUROLOGY: non-focal; no gross sensory deficits   PSYCH: A+O to person        LABS:                        11.7   22.63 )-----------( 161      ( 01 Feb 2021 15:43 )             37.8           RADIOLOGY & ADDITIONAL TESTS:  Results Reviewed:   Imaging Personally Reviewed:  Electrocardiogram Personally Reviewed:    COORDINATION OF CARE:  Care Discussed with Consultants/Other Providers [Y/N]:  Prior or Outpatient Records Reviewed [Y/N]:  
Patient is a 80y old  Male who presents with a chief complaint of shortness of breath, AMS (03 Feb 2021 12:48)      SUBJECTIVE / OVERNIGHT EVENTS: Pt's chart/labs reviewed. Transtioned to 6L NC@94%. Palliative measures have been instituted in the care of the patient. Palliative/Family have discussed inpatient hospice facilities covid friendly and closer to [place] as requested.      MEDICATIONS  (STANDING):  dextrose 5%. 1000 milliLiter(s) (50 mL/Hr) IV Continuous <Continuous>  heparin   Injectable 5000 Unit(s) SubCutaneous every 8 hours  piperacillin/tazobactam IVPB.. 3.375 Gram(s) IV Intermittent every 8 hours    MEDICATIONS  (PRN):  acetaminophen  Suppository .. 650 milliGRAM(s) Rectal every 4 hours PRN Temp greater or equal to 38.5C (101.3F)  glycopyrrolate Injectable 0.2 milliGRAM(s) IV Push every 6 hours PRN increased secretions  metoprolol tartrate Injectable 5 milliGRAM(s) IV Push every 6 hours PRN tachycardia and hypertension  morphine  - Injectable 1 milliGRAM(s) IV Push every 2 hours PRN respiratory distress, dyspnea      PHYSICAL EXAM:  Vital Signs Last 24 Hrs  T(C): 36.7 (03 Feb 2021 12:56), Max: 37.1 (03 Feb 2021 04:41)  T(F): 98 (03 Feb 2021 12:56), Max: 98.8 (03 Feb 2021 04:41)  HR: 86 (03 Feb 2021 12:56) (72 - 86)  BP: 132/68 (03 Feb 2021 12:56) (132/68 - 159/78)  BP(mean): --  RR: 19 (03 Feb 2021 12:56) (18 - 19)  SpO2: 95% (03 Feb 2021 12:56) (95% - 100%)    CONSTITUTIONAL: NAD  RESPIRATORY: rhonchi + crackles basilar to auscultation bilaterally  CARDIOVASCULAR: Regular rate and rhythm, normal S1 and S2, no murmur/rub/gallop; No lower extremity edema  NEUROLOGY: non-focal; no gross sensory deficits   PSYCH: A+O to person    LABS:                        11.7   22.63 )-----------( 161      ( 01 Feb 2021 15:43 )             37.8                       RADIOLOGY & ADDITIONAL TESTS:  Results Reviewed:   Imaging Personally Reviewed:  Electrocardiogram Personally Reviewed:    COORDINATION OF CARE:  Care Discussed with Consultants/Other Providers [Y/N]:  Prior or Outpatient Records Reviewed [Y/N]:  
Patient is a 80y old  Male who presents with a chief complaint of shortness of breath, AMS (23 Jan 2021 16:00)    SUBJECTIVE / OVERNIGHT EVENTS: appears comfortable. demented  ADDITIONAL REVIEW OF SYSTEMS:    MEDICATIONS  (STANDING):  ascorbic acid 500 milliGRAM(s) Oral daily  carvedilol 3.125 milliGRAM(s) Oral every 12 hours  cefTRIAXone   IVPB 1000 milliGRAM(s) IV Intermittent every 24 hours  dexAMETHasone  Injectable 6 milliGRAM(s) IV Push daily  dextrose 5%. 1000 milliLiter(s) (55 mL/Hr) IV Continuous <Continuous>  escitalopram 20 milliGRAM(s) Oral daily  finasteride 5 milliGRAM(s) Oral daily  heparin   Injectable 5000 Unit(s) SubCutaneous every 8 hours  OXcarbazepine 300 milliGRAM(s) Oral two times a day  pantoprazole    Tablet 40 milliGRAM(s) Oral before breakfast  tamsulosin 0.4 milliGRAM(s) Oral at bedtime  zinc sulfate 220 milliGRAM(s) Oral daily    MEDICATIONS  (PRN):  acetaminophen   Tablet .. 650 milliGRAM(s) Oral every 4 hours PRN Temp greater or equal to 38.5C (101.3F)  acetaminophen  Suppository .. 650 milliGRAM(s) Rectal every 4 hours PRN Temp greater or equal to 38.5C (101.3F)    CAPILLARY BLOOD GLUCOSE      POCT Blood Glucose.: 133 mg/dL (24 Jan 2021 11:58)    I&O's Summary    23 Jan 2021 07:01  -  24 Jan 2021 07:00  --------------------------------------------------------  IN: 0 mL / OUT: 750 mL / NET: -750 mL    PHYSICAL EXAM:  Vital Signs Last 24 Hrs  T(C): 36.6 (24 Jan 2021 05:10), Max: 36.8 (23 Jan 2021 17:38)  T(F): 97.8 (24 Jan 2021 05:10), Max: 98.2 (23 Jan 2021 17:38)  HR: 80 (24 Jan 2021 05:10) (74 - 81)  BP: 149/85 (24 Jan 2021 05:10) (149/85 - 156/91)  BP(mean): --  RR: 18 (24 Jan 2021 05:10) (17 - 18)  SpO2: 98% (24 Jan 2021 05:10) (98% - 100%)  CONSTITUTIONAL: pleasantly demented old man lying in bed   HEENT: on 3L NC,  RESPIRATORY: Normal respiratory effort; lungs are clear to auscultation bilaterally  CARDIOVASCULAR: Regular rate and rhythm, normal S1 and S2, No lower extremity edema  ABDOMEN: Nontender to palpation, normoactive bowel sounds  GENITOURINARY: condom catheter  MUSCLOSKELETAL: no clubbing or cyanosis of digits  PSYCH: cannot assess    LABS:                        14.3   15.89 )-----------( 399      ( 23 Jan 2021 09:10 )             46.4     01-24    152<H>  |  114<H>  |  66<H>  ----------------------------<  98  4.7   |  25  |  1.11    Ca    9.4      24 Jan 2021 07:36  Phos  3.3     01-23  Mg     2.6     01-23    TPro  7.4  /  Alb  2.8<L>  /  TBili  0.6  /  DBili  x   /  AST  62<H>  /  ALT  46<H>  /  AlkPhos  111  01-24    RADIOLOGY & ADDITIONAL TESTS:  Results Reviewed:   Imaging Personally Reviewed:  Electrocardiogram Personally Reviewed:    COORDINATION OF CARE:  Care Discussed with Consultants/Other Providers [Y/N]:  Prior or Outpatient Records Reviewed [Y/N]:  
Medicine Progress Note        SUBJECTIVE / OVERNIGHT EVENTS:    Patient nonverbal and unable to provide any clinical information.    ADDITIONAL REVIEW OF SYSTEMS:    Unable to ascertain due to nonverbal status.    MEDICATIONS  (STANDING):  ascorbic acid 500 milliGRAM(s) Oral daily  carvedilol 3.125 milliGRAM(s) Oral every 12 hours  dexAMETHasone  Injectable 6 milliGRAM(s) IV Push daily  dextrose 5%. 1000 milliLiter(s) (100 mL/Hr) IV Continuous <Continuous>  escitalopram 20 milliGRAM(s) Oral daily  finasteride 5 milliGRAM(s) Oral daily  heparin   Injectable 5000 Unit(s) SubCutaneous every 8 hours  OXcarbazepine Suspension 300 milliGRAM(s) Oral two times a day  pantoprazole    Tablet 40 milliGRAM(s) Oral before breakfast  piperacillin/tazobactam IVPB.. 3.375 Gram(s) IV Intermittent every 8 hours  tamsulosin 0.4 milliGRAM(s) Oral at bedtime  zinc sulfate 220 milliGRAM(s) Oral daily    MEDICATIONS  (PRN):  acetaminophen  Suppository .. 650 milliGRAM(s) Rectal every 4 hours PRN Temp greater or equal to 38.5C (101.3F)  metoprolol tartrate Injectable 5 milliGRAM(s) IV Push every 6 hours PRN tachycardia and hypertension    CAPILLARY BLOOD GLUCOSE      POCT Blood Glucose.: 100 mg/dL (2021 12:02)  POCT Blood Glucose.: 83 mg/dL (2021 07:50)    I&O's Summary    2021 07:01  -  2021 14:11  --------------------------------------------------------  IN: 0 mL / OUT: 400 mL / NET: -400 mL        PHYSICAL EXAM:  Vital Signs Last 24 Hrs  T(C): 36.9 (2021 12:59), Max: 36.9 (2021 12:59)  T(F): 98.4 (2021 12:59), Max: 98.4 (2021 12:59)  HR: 166 (2021 12:59) (74 - 166)  BP: 148/66 (2021 12:59) (138/67 - 148/66)  BP(mean): --  RR: 20 (2021 12:59) (18 - 20)  SpO2: 99% (2021 12:59) (97% - 99%)  CONSTITUTIONAL: Nonverbal  ENMT: No clear pharyngeal exudates or erythema  RESPIRATORY: Coarse bilateral crackles on exam.  CARDIOVASCULAR: tachycardic; rhythm is sinus. normal S1 and S2, no murmur/rub/gallop; No lower extremity edema; Peripheral pulses are 2+ bilaterally  ABDOMEN: Nontender to palpation, normoactive bowel sounds, no rebound/guarding; No hepatosplenomegaly  PSYCH: Non verbal  NEUROLOGY: resting tremor noted on occasion.     LABS:                        13.0   25.89 )-----------( 211      ( 2021 08:43 )             43.8         157<H>  |  120<H>  |  39<H>  ----------------------------<  68<L>  4.1   |  25  |  1.14    Ca    8.9      2021 08:43  Phos  3.0       Mg     2.8                 Urinalysis Basic - ( 2021 08:43 )    Color: Yellow / Appearance: Slightly Turbid / S.022 / pH: x  Gluc: x / Ketone: Negative  / Bili: Negative / Urobili: <2 mg/dL   Blood: x / Protein: Trace / Nitrite: Negative   Leuk Esterase: Negative / RBC: 9 /HPF / WBC 2 /HPF   Sq Epi: x / Non Sq Epi: 1 /HPF / Bacteria: Negative        Culture - Blood (collected 2021 10:13)  Source: .Blood Blood-Venous  Preliminary Report (2021 11:01):    No growth to date.    Culture - Blood (collected 2021 10:13)  Source: .Blood Blood-Venous  Preliminary Report (2021 11:01):    No growth to date.      COVID-19 PCR: Detected (2021 23:05)      RADIOLOGY & ADDITIONAL TESTS:  Imaging from Last 24 Hours:    Electrocardiogram/QTc Interval:    COORDINATION OF CARE:  Care Discussed with Consultants/Other Providers:  
**Per current hospital emergency protocol, in an effort to reduce COVID-19 exposures, consult information was obtained via chart review and telephonic communication.**    SUBJECTIVE AND OBJECTIVE: Patient was seen but not examined. He continues to require NRB at 15L. Patient's wife and daughter were at bedside. They understandably expressed concern for patient going to the [facility] as it is very far from where the live in [place]. They asked if COVID swab could be done to check his status for more hospice options.     INTERVAL HPI/OVERNIGHT EVENTS:   - Chart reviewed. Over the past 24 hours, he required PRNs of robinul x1.     DNR on chart: Yes    Allergies    No Known Allergies    Intolerances    MEDICATIONS  (STANDING):  ascorbic acid 500 milliGRAM(s) Oral daily  carvedilol 3.125 milliGRAM(s) Oral every 12 hours  dextrose 5%. 1000 milliLiter(s) (50 mL/Hr) IV Continuous <Continuous>  escitalopram 20 milliGRAM(s) Oral daily  finasteride 5 milliGRAM(s) Oral daily  heparin   Injectable 5000 Unit(s) SubCutaneous every 8 hours  OXcarbazepine Suspension 300 milliGRAM(s) Oral two times a day  pantoprazole    Tablet 40 milliGRAM(s) Oral before breakfast  piperacillin/tazobactam IVPB.. 3.375 Gram(s) IV Intermittent every 8 hours  tamsulosin 0.4 milliGRAM(s) Oral at bedtime  zinc sulfate 220 milliGRAM(s) Oral daily    MEDICATIONS  (PRN):  acetaminophen  Suppository .. 650 milliGRAM(s) Rectal every 4 hours PRN Temp greater or equal to 38.5C (101.3F)  glycopyrrolate Injectable 0.2 milliGRAM(s) IV Push every 6 hours PRN increased secretions  metoprolol tartrate Injectable 5 milliGRAM(s) IV Push every 6 hours PRN tachycardia and hypertension  morphine  - Injectable 1 milliGRAM(s) IV Push every 2 hours PRN respiratory distress, dyspnea      ITEMS UNCHECKED ARE NOT PRESENT    PRESENT SYMPTOMS: [ x]Unable to obtain due to poor mentation   Source if other than patient:  [ ]Family   [ ]Team     Pain:  [ ]yes [x ]no- Deferred due to covid   QOL impact -   Location -                    Aggravating factors -  Quality -  Radiation -  Timing-  Severity (0-10 scale):  Minimal acceptable level (0-10 scale):     Dyspnea:                           [ ]Mild [ ]Moderate [ ]Severe  Anxiety:                             [ ]Mild [ ]Moderate [ ]Severe  Fatigue:                             [ ]Mild [ ]Moderate [ ]Severe  Nausea:                             [ ]Mild [ ]Moderate [ ]Severe  Loss of appetite:              [ ]Mild [ ]Moderate [ ]Severe  Constipation:                    [ ]Mild [ ]Moderate [ ]Severe    PAIN AD Score:	  [email or web address]/painad.pdf (Ctrl + left click to view)    Other Symptoms:  [ ]All other review of systems negative     Palliative Performance Status Version 2:    10     %      [email or web address]_[email or web address]  PHYSICAL EXAM:  Vital Signs Last 24 Hrs  T(C): 37.1 (03 Feb 2021 04:41), Max: 37.1 (03 Feb 2021 04:41)  T(F): 98.8 (03 Feb 2021 04:41), Max: 98.8 (03 Feb 2021 04:41)  HR: 72 (03 Feb 2021 04:41) (72 - 78)  BP: 152/58 (03 Feb 2021 04:41) (152/58 - 159/78)  BP(mean): --  RR: 18 (03 Feb 2021 04:41) (18 - 18)  SpO2: 98% (03 Feb 2021 04:41) (98% - 100%) I&O's Summary     GENERAL: Deferred due to covid, please see physical exam from primary medical team   [ ]Alert  [ ]Oriented x   [ ]Lethargic  [ ]Cachexia  [ ]Unarousable  [ ]Verbal  [ ]Non-Verbal  Behavioral:   [ ]Anxiety  [ ]Delirium [ ]Agitation [ ]Other  HEENT:  [ ]Normal   [ ]Dry mouth   [ ]ET Tube/Trach  [ ]Oral lesions  PULMONARY:   [ ]Clear [ ]Tachypnea  [ ]Audible excessive secretions   [ ]Rhonchi        [ ]Right [ ]Left [ ]Bilateral  [ ]Crackles        [ ]Right [ ]Left [ ]Bilateral  [ ]Wheezing     [ ]Right [ ]Left [ ]Bilateral  [ ]Diminished BS [ ] Right [ ]Left [ ]Bilateral  CARDIOVASCULAR:    [ ]Regular [ ]Irregular [ ]Tachy  [ ]Gee [ ]Murmur [ ]Other  GASTROINTESTINAL:  [ ]Soft  [ ]Distended   [ ]+BS  [ ]Non tender [ ]Tender  [ ]PEG [ ]OGT/ NGT   Last BM:      GENITOURINARY:  [ ]Normal [ ]Incontinent   [ ]Oliguria/Anuria   [ ]Davis  MUSCULOSKELETAL:   [ ]Normal   [ ]Weakness  [ ]Bed/Wheelchair bound [ ]Edema  NEUROLOGIC:   [ ]No focal deficits  [ ] Cognitive impairment  [ ] Dysphagia [ ]Dysarthria [ ] Paresis [ ]Other   SKIN:   [ ]Normal  [ ]Rash   [ ]Pressure ulcer(s) [ ]y [ ]n present on admission    CRITICAL CARE:  [ ]Shock Present  [ ]Septic [ ]Cardiogenic [ ]Neurologic [ ]Hypovolemic  [ ]Vasopressors [ ]Inotropes  [x ]Respiratory failure present [ ]Mechanical Ventilation [ ]Non-invasive ventilatory support [ ]High-Flow  [ ]Acute  [ ]Chronic [ ]Hypoxic  [ ]Hypercarbic [ ]Other  [ ]Other organ failure     LABS:                        11.7   22.63 )-----------( 161      ( 01 Feb 2021 15:43 )             37.8       RADIOLOGY & ADDITIONAL STUDIES: n/a    Protein Calorie Malnutrition Present: [ ]mild [ ]moderate [ ]severe [ ]underweight [ ]morbid obesity  https://www.andeal.org/vault/2440/web/files/ONC/Table_Clinical%20Characteristics%20to%20Document%20Malnutrition-White%20JV%20et%20al%202012.pdf      Weight (kg): 51.9 (01-22-21 @ 06:36)    [x ]PPSV2 < or = 30%  [ ]significant weight loss [ ]poor nutritional intake [ ]anasarca   Albumin, Serum: 2.7 g/dL (01-25-21 @ 05:17)   [ ]Artificial Nutrition    REFERRALS:   [ ]Chaplaincy  [x ]Hospice  [ ]Child Life  [ x]Social Work  [ ]Case management [ ]Holistic Therapy     Goals of Care Document:    
SUBJECTIVE AND OBJECTIVE: Patient seen today from doorway. He was awake, smiling.     INTERVAL HPI/OVERNIGHT EVENTS:  Chart reivewed. He is on 6L NC O2. Over 24 hours, he required PRNs of robinul x1.     DNR on chart: Yes    Allergies    No Known Allergies    Intolerances    MEDICATIONS  (STANDING):  dextrose 5%. 1000 milliLiter(s) (50 mL/Hr) IV Continuous <Continuous>  heparin   Injectable 5000 Unit(s) SubCutaneous every 8 hours  piperacillin/tazobactam IVPB.. 3.375 Gram(s) IV Intermittent every 8 hours    MEDICATIONS  (PRN):  acetaminophen  Suppository .. 650 milliGRAM(s) Rectal every 4 hours PRN Temp greater or equal to 38.5C (101.3F)  glycopyrrolate Injectable 0.2 milliGRAM(s) IV Push every 4 hours PRN Desat  metoprolol tartrate Injectable 5 milliGRAM(s) IV Push every 6 hours PRN tachycardia and hypertension  morphine   Solution 2 milliGRAM(s) Oral every 2 hours PRN dyspnea or pain      ITEMS UNCHECKED ARE NOT PRESENT    PRESENT SYMPTOMS: [ ]Unable to obtain due to poor mentation   Source if other than patient:  [ ]Family   [ ]Team     Pain:  [ ]yes [ x]no  QOL impact -   Location -                    Aggravating factors -  Quality -  Radiation -  Timing-  Severity (0-10 scale):  Minimal acceptable level (0-10 scale):     Dyspnea:                           [ ]Mild [ ]Moderate [ ]Severe  Anxiety:                             [ ]Mild [ ]Moderate [ ]Severe  Fatigue:                             [ ]Mild [ ]Moderate [ ]Severe  Nausea:                             [ ]Mild [ ]Moderate [ ]Severe  Loss of appetite:              [ ]Mild [ ]Moderate [ ]Severe  Constipation:                    [ ]Mild [ ]Moderate [ ]Severe    PAIN AD Score:	  [email or web address]/[email or web address] (Ctrl + left click to view)    Other Symptoms:  [ ]All other review of systems negative     Palliative Performance Status Version 2:    30     %      http://npcrc.org/files/news/palliative_performance_scale_[email or web address].pdf  PHYSICAL EXAM:  Vital Signs Last 24 Hrs  T(C): 36.6 (04 Feb 2021 09:22), Max: 36.6 (03 Feb 2021 21:59)  T(F): 97.8 (04 Feb 2021 09:22), Max: 97.8 (03 Feb 2021 21:59)  HR: 72 (04 Feb 2021 09:22) (72 - 82)  BP: 133/58 (04 Feb 2021 09:22) (133/58 - 139/66)  BP(mean): --  RR: 20 (03 Feb 2021 21:59) (20 - 20)  SpO2: 95% (04 Feb 2021 09:22) (95% - 96%) I&O's Summary     GENERAL: Deferred due to covid, please see physical exam from primary medical team   [ ]Alert  [ ]Oriented x   [ ]Lethargic  [ ]Cachexia  [ ]Unarousable  [ ]Verbal  [ ]Non-Verbal  Behavioral:   [ ]Anxiety  [ ]Delirium [ ]Agitation [ ]Other  HEENT:  [ ]Normal   [ ]Dry mouth   [ ]ET Tube/Trach  [ ]Oral lesions  PULMONARY:   [ ]Clear [ ]Tachypnea  [ ]Audible excessive secretions   [ ]Rhonchi        [ ]Right [ ]Left [ ]Bilateral  [ ]Crackles        [ ]Right [ ]Left [ ]Bilateral  [ ]Wheezing     [ ]Right [ ]Left [ ]Bilateral  [ ]Diminished BS [ ] Right [ ]Left [ ]Bilateral  CARDIOVASCULAR:    [ ]Regular [ ]Irregular [ ]Tachy  [ ]Gee [ ]Murmur [ ]Other  GASTROINTESTINAL:  [ ]Soft  [ ]Distended   [ ]+BS  [ ]Non tender [ ]Tender  [ ]PEG [ ]OGT/ NGT   Last BM:      GENITOURINARY:  [ ]Normal [ ]Incontinent   [ ]Oliguria/Anuria   [ ]Davis  MUSCULOSKELETAL:   [ ]Normal   [ ]Weakness  [ ]Bed/Wheelchair bound [ ]Edema  NEUROLOGIC:   [ ]No focal deficits  [ ] Cognitive impairment  [ ] Dysphagia [ ]Dysarthria [ ] Paresis [ ]Other   SKIN:   [ ]Normal  [ ]Rash   [ ]Pressure ulcer(s) [ ]y [ ]n present on admission    CRITICAL CARE:  [ ]Shock Present  [ ]Septic [ ]Cardiogenic [ ]Neurologic [ ]Hypovolemic  [ ]Vasopressors [ ]Inotropes  [ ]Respiratory failure present [ ]Mechanical Ventilation [ ]Non-invasive ventilatory support [ ]High-Flow  [ ]Acute  [ ]Chronic [ ]Hypoxic  [ ]Hypercarbic [ ]Other  [ ]Other organ failure     LABS: n/a    RADIOLOGY & ADDITIONAL STUDIES: n/a    Protein Calorie Malnutrition Present: [ ]mild [ ]moderate [x ]severe [ ]underweight [ ]morbid obesity  https://www.andeal.org/vault/2440/web/files/ONC/Table_Clinical%20Characteristics%20to%20Document%20Malnutrition-White%20JV%20et%20al%202012.pdf      Weight (kg): 51.9 (01-22-21 @ 06:36)    [ x]PPSV2 < or = 30%  [ ]significant weight loss [ ]poor nutritional intake [ ]anasarca   Albumin, Serum: 2.7 g/dL (01-25-21 @ 05:17)   [ ]Artificial Nutrition    REFERRALS:   [ ]Chaplaincy  [x ]Hospice  [ ]Child Life  [x ]Social Work  [ ]Case management [ ]Holistic Therapy     Goals of Care Document:

## 2021-02-06 NOTE — PROGRESS NOTE ADULT - PROBLEM SELECTOR PLAN 1
- Stopped PO meds  - Pt is a DNR/DNI  - Will stop any continued blood draws  - Dispo: Pt's Family now desires a covid friendly hospice institution closer to [place].  - The Pt's wife does not want any feeding tubes/ventilator placed on her .  - Will d/w palliative team additional measures in order to facilitate the Pt's transition.  - Prognosis: Poor
-Pt p/w leukocytosis, tachypneic, low grade fever  -CXR with bilateral patchy opacities likely 2/2 COVID infection  -UA noted to be positive in the setting of recent abx course. Pt a poor historian so unable elict symptoms. Unclear if this represents persistent infection   -Favor treating with CTX pending repeat urine cx.  -may also consider broadening to zosyn if pt's resp status continues to deteriorate, must consider aspiration pna in the setting of known dysphagia  -check sputum cx  -f/u blood and urine cx  1/21  - cont decadron, cont ceftriaxone, will do a 5 day course of iv abx  1/23  cultures no growth,  1/24  - can d/c ceftriaxone after dose tomorrow (dose #5)
-patient with acute hypoxic respiratory failure due to COVID-19  -patient currently requiring 5L of oxygen to maintain saturations of 94%  -to complete 10 day course of dexamethasone (started on January 21st)  -continue Heparin SQ for DVT prophylaxis  -given initial SUSAN and renal dysfunction on admission he wasn't started on Remdesivir. Now outside window where it would be beneficial.  -patient had met SIRS criteria-likely due to underlying COVID pneumonia as CXR had shown bilateral opacities. However his UA was positive and he was started on Ceftriaxone. Completed the course that was started.  However his WBC today has increased from 15 to 33. Would consult ID to determine if there's a need for antibiotics such as Zosyn to cover a superimposed bacterial pneumonia. Patient possibly could have been aspirating.
-patient with acute hypoxic respiratory failure due to COVID-19  -patient currently requiring 2L of oxygen to maintain saturations of 100%. Oxygenation has improved as patient initially had been on 6L of oxygen  -to complete 10 day course of dexamethasone (started on January 21st)  -continue Heparin SQ for DVT prophylaxis  -given initial SUSAN and renal dysfunction on admission he wasn't started on Remdesivir  -patient had met SIRS criteria-likely due to underlying COVID pneumonia as CXR had shown bilateral opacities. However his UA was positive and he was started on Ceftriaxone. Can complete the 5 day course that was started. Last dose today.
-patient with acute hypoxic respiratory failure due to COVID-19  -patient currently requiring 15L of oxygen via NRB to maintain saturations of 99%  -to complete 10 day course of dexamethasone (started on January 21st)  -continue Heparin SQ for DVT prophylaxis  -given initial SUSAN and renal dysfunction on admission he wasn't started on Remdesivir. Now outside window where it would be beneficial.  -patient had met SIRS criteria-likely due to underlying COVID pneumonia as CXR had shown bilateral opacities. However his UA was positive and he was started on Ceftriaxone. Completed the course that was started.  However his WBC subsequently has increased to the 30's. Started on Zosyn for suspected aspiration pneumonia as below.
-patient with acute hypoxic respiratory failure due to COVID-19  -patient currently requiring 5L of oxygen to maintain saturations of 98%. Oxygenation had improved as patient initially had been on 6L of oxygen, but now looks to be increasing again  -to complete 10 day course of dexamethasone (started on January 21st)  -continue Heparin SQ for DVT prophylaxis  -given initial SUSAN and renal dysfunction on admission he wasn't started on Remdesivir  -patient had met SIRS criteria-likely due to underlying COVID pneumonia as CXR had shown bilateral opacities. However his UA was positive and he was started on Ceftriaxone. Completed the 5 day course that was started.  can discontinue ceftriaxone.
- Pt is a DNR/DNI  - Will stop any continued blood draws  - Pt's wife desires his return home at this time w/ home hospice measures to be instituted  - The Pt's wife does not want any feeding tubes/ventilator placed on her .  - Will d/w palliative team additional measures in order to facilitate the Pt's transition.
-patient with acute hypoxic respiratory failure due to COVID-19  -patient currently requiring 15L of oxygen via NRB to maintain saturations of 96%  -completed 10 day course of Dexamethasone on January 30th  -continue Heparin SQ for DVT prophylaxis  -given initial SUSAN and renal dysfunction on admission he wasn't started on Remdesivir. Now outside window where it would be beneficial.  -patient had met SIRS criteria-likely due to underlying COVID pneumonia as CXR had shown bilateral opacities. However his UA was positive and he was started on Ceftriaxone earlier this admission. Completed the course that was started.  However his WBC subsequently had increased to the 30's. Started on Zosyn for suspected aspiration pneumonia. WBC did improve from 37 to 25 but now increasing again. Blood cultures from the 28th and urine cultures from 29th unremarkable. ID follow-up.
- The Pt's wife does not want any feeding tubes/ventilator placed on her .  - Stopped PO meds  - Pt is a DNR/DNI  - Stop any continued blood draws  - Dispo:  Home w/ hospice for a likely Sunday 7, 2021 transfer.
-Ongoing acute hypoxic respiratory failure due to COVID-19 and cont. 15L of oxygen via NRB to maintain saturations of 96%  -completed 10 day course of Dexamethasone on January 30th  -continue Heparin SQ for DVT prophylaxis  -given initial SUSAN and renal dysfunction on admission he wasn't started on Remdesivir. Now outside window where it would be beneficial.  -patient had met SIRS criteria-likely due to underlying COVID pneumonia as CXR had shown bilateral opacities.   - U/A treated w/ rocephin  - Disposition: Hospice and comfort measures as per wife.
-patient with acute hypoxic respiratory failure due to COVID-19  -patient currently requiring 15L of oxygen via NRB to maintain saturations of 96%  -to complete 10 day course of dexamethasone (started on January 21st)  -continue Heparin SQ for DVT prophylaxis  -given initial SUSAN and renal dysfunction on admission he wasn't started on Remdesivir. Now outside window where it would be beneficial.  -patient had met SIRS criteria-likely due to underlying COVID pneumonia as CXR had shown bilateral opacities. However his UA was positive and he was started on Ceftriaxone. Completed the course that was started.  However his WBC subsequently had increased to the 30's. Started on Zosyn for suspected aspiration pneumonia as below.
-Pt p/w leukocytosis, tachypneic, low grade fever  -CXR with bilateral patchy opacities likely 2/2 COVID infection  -UA noted to be positive in the setting of recent abx course. Pt a poor historian so unable elict symptoms. Unclear if this represents persistent infection   -Favor treating with CTX pending repeat urine cx.  -may also consider broadening to zosyn if pt's resp status continues to deteriorate, must consider aspiration pna in the setting of known dysphagia  -check sputum cx  -f/u blood and urine cx  1/21  - cont decadron
-patient with acute hypoxic respiratory failure due to COVID-19  -patient currently requiring 5L of oxygen to maintain saturations of 95%.   -to complete 10 day course of dexamethasone (started on January 21st)  -continue Heparin SQ for DVT prophylaxis  -given initial SUSAN and renal dysfunction on admission he wasn't started on Remdesivir  -patient had met SIRS criteria-likely due to underlying COVID pneumonia as CXR had shown bilateral opacities. However his UA was positive and he was started on Ceftriaxone. Completed the course that was started.  However his WBC today has increased from 15 to 33. Would consult ID to determine if there's a need for antibiotics such as Zosyn to cover a superimposed bacterial pneumonia. Patient possibly could have been aspirating.
- Dispo:  Home w/ hospice on Sunday 7, 2021 transfer.  - The Pt's wife does not want any feeding tubes/ventilator placed on her .  - Stopped PO meds  - Pt is a DNR/DNI  - Stop any continued blood draws
- The Pt's wife does not want any feeding tubes/ventilator placed on her .  - Stopped PO meds  - Pt is a DNR/DNI  - Stop any continued blood draws  - Dispo:  Home w/ hospice for a likely Sunday 7, 2021 transfer.
-Pt p/w leukocytosis, tachypneic, low grade fever  -CXR with bilateral patchy opacities likely 2/2 COVID infection  -UA noted to be positive in the setting of recent abx course. Pt a poor historian so unable elict symptoms. Unclear if this represents persistent infection   -Favor treating with CTX pending repeat urine cx.  -may also consider broadening to zosyn if pt's resp status continues to deteriorate, must consider aspiration pna in the setting of known dysphagia  -check sputum cx  -f/u blood and urine cx  1/21  - cont decadron, cont ceftriaxone, will do a 5 day course of iv abx  1/23  cultures no growth,
Dyspnea 2/2 COVID-19 PNA, currently on NRB 15L, unable to wean   - s/p dexamethasone course, unable to receive Remdesivir due to SUSAN   - PLEASE RE-swab patient for covid as family would like to continue with hospice services but closer to their home. If patient is covid negative, this may open up more inpatient hospice options for dispo planning. Ideally, family would like to bring him home, however patient's oxygen requirements are too high for home hospice to be done.   - Morphine 1mg [id number] prn resp distress.   - Robinul 0.2mg IV q6hrs prn secretions  - If oxygen requirements are lower, can consider transitioning to morphine solution 3mg q4hrs prn respiratory distress, glycopyrrolate oral solution 0.4mg q4hrs prn
-Pt p/w leukocytosis, tachypneic, low grade fever  -CXR with bilateral patchy opacities likely 2/2 COVID infection  -UA noted to be positive in the setting of recent abx course. Pt a poor historian so unable elict symptoms. Unclear if this represents persistent infection   -Favor treating with CTX pending repeat urine cx.  -may also consider broadening to zosyn if pt's resp status continues to deteriorate, must consider aspiration pna in the setting of known dysphagia  -check sputum cx  -f/u blood and urine cx  1/21  - cont decadron, cont ceftriaxone, will do a 5 day course of if abx
Dyspnea 2/2 COVID-19 PNA, weaned to 6L NC   - s/p dexamethasone course, unable to receive Remdesivir due to SUSAN   - COVID swab 2/3 Positive. Discussed with patient's wife and daughter, who would be able to take him home with hospice services.   - Morphine 1mg [id number] prn resp distress. Transitioned to 2mg PO morphine solution q2hrs prn, which he can go home with.   - Robinul 0.2mg IV q6hrs prn secretions. Transitioned to 2mg PO robinul qhrs prn oral secretions, which he can go home with. If not able to tolerate PO, can use atropine drops q2hrs prn secretions.   - If oxygen requirements are lower, can consider transitioning to morphine solution 3mg q4hrs prn respiratory distress, glycopyrrolate oral solution 0.4mg q4hrs prn

## 2021-02-06 NOTE — PROGRESS NOTE ADULT - NUTRITIONAL ASSESSMENT
This patient has been assessed with a concern for Malnutrition and has been determined to have a diagnosis/diagnoses of Severe protein-calorie malnutrition.    This patient is being managed with:   Diet Dysphagia 1 Pureed-Honey Consistency Fluid-  Entered: Jan 21 2021  1:28AM    
This patient has been assessed with a concern for Malnutrition and has been determined to have a diagnosis/diagnoses of Severe protein-calorie malnutrition.    This patient is being managed with:   Diet NPO-  Entered: Jan 31 2021  2:48PM    
This patient has been assessed with a concern for Malnutrition and has been determined to have a diagnosis/diagnoses of Severe protein-calorie malnutrition.    This patient is being managed with:   Diet Dysphagia 1 Pureed-Honey Consistency Fluid-  Entered: Jan 21 2021  1:28AM    
This patient has been assessed with a concern for Malnutrition and has been determined to have a diagnosis/diagnoses of Severe protein-calorie malnutrition.    This patient is being managed with:   Diet Dysphagia 1 Pureed-Honey Consistency Fluid-  Entered: Jan 21 2021  1:28AM

## 2021-02-06 NOTE — PROGRESS NOTE ADULT - REASON FOR ADMISSION
Acute hypoxic respiratory failure due to COVID-19
Acute respiratory failure due to COVID
shortness of breath, AMS
shortness of breath, AMS
COVID
COVID
shortness of breath, AMS
Acute respiratory failure due to COVID
COVID
shortness of breath, AMS

## 2021-02-06 NOTE — PROGRESS NOTE ADULT - PROBLEM SELECTOR PROBLEM 5
Hypernatremia
Parkinson disease
History of BPH
SUSAN (acute kidney injury)
Parkinson disease
Hypernatremia
Acute respiratory failure, unspecified whether with hypoxia or hypercapnia
SUSAN (acute kidney injury)
Acute respiratory failure, unspecified whether with hypoxia or hypercapnia

## 2021-02-06 NOTE — PROGRESS NOTE ADULT - PROVIDER SPECIALTY LIST ADULT
Infectious Disease
Infectious Disease
Palliative Care
Infectious Disease
Hospitalist
Palliative Care
Hospitalist

## 2021-02-07 NOTE — DISCHARGE NOTE PROVIDER - DETAILS OF MALNUTRITION DIAGNOSIS/DIAGNOSES
This patient has been assessed with a concern for Malnutrition and was treated during this hospitalization for the following Nutrition diagnosis/diagnoses:     -  01/27/2021: Severe protein-calorie malnutrition

## 2021-02-07 NOTE — DISCHARGE NOTE PROVIDER - NSDCMRMEDTOKEN_GEN_ALL_CORE_FT
Coreg 3.125 mg oral tablet: 1 tab(s) orally 2 times a day  dutasteride 0.5 mg oral capsule: 1 cap(s) orally once a day  Flomax 0.4 mg oral capsule: 1 cap(s) orally once a day  Lexapro 20 mg oral tablet: 1 tab(s) orally once a day  OXcarbazepine 300 mg oral tablet: 1 tab(s) orally 2 times a day  Protonix 40 mg oral delayed release tablet: 1 tab(s) orally once a day

## 2021-02-07 NOTE — DISCHARGE NOTE PROVIDER - HOSPITAL COURSE
79 y/o M with PMHx Parkinson disease and s/p pacemaker placement presented with shortness of breath and AMS. Found to be in acute hypoxic respiratory failure due to COVID 19.     Patient was stabilized. He was made DNR/DNI and extraordinary measures were withdrawn. Case was d/w Dr. Mack and he was discharged to home hospice on 2/7/2021.

## 2021-02-07 NOTE — CHART NOTE - NSCHARTNOTEFT_GEN_A_CORE
Pt can be discharged home w/ hospice into the care of his family.        PHYSICAL EXAM:  Vital Signs Last 24 Hrs  T(C): 36.5 (06 Feb 2021 21:15), Max: 37.3 (06 Feb 2021 14:44)  T(F): 97.7 (06 Feb 2021 21:15), Max: 99.1 (06 Feb 2021 14:44)  HR: 60 (07 Feb 2021 04:49) (60 - 90)  BP: 122/92 (06 Feb 2021 21:15) (122/92 - 123/75)  BP(mean): --  RR: 16 (07 Feb 2021 04:49) (16 - 24)  SpO2: 100% (07 Feb 2021 04:49) (99% - 100%) Pt can be discharged home w/ hospice into the care of his family. Please note that the patient was noted to have sepsis secondary to covid-19 pneumonia.        PHYSICAL EXAM:  Vital Signs Last 24 Hrs  T(C): 36.5 (06 Feb 2021 21:15), Max: 37.3 (06 Feb 2021 14:44)  T(F): 97.7 (06 Feb 2021 21:15), Max: 99.1 (06 Feb 2021 14:44)  HR: 60 (07 Feb 2021 04:49) (60 - 90)  BP: 122/92 (06 Feb 2021 21:15) (122/92 - 123/75)  BP(mean): --  RR: 16 (07 Feb 2021 04:49) (16 - 24)  SpO2: 100% (07 Feb 2021 04:49) (99% - 100%) Pt can be discharged home w/ hospice into the care of his family. Please note that the patient was noted to have sepsis secondary to covid-19 pneumonia and it was present on admission to [facility].        PHYSICAL EXAM:  Vital Signs Last 24 Hrs  T(C): 36.5 (06 Feb 2021 21:15), Max: 37.3 (06 Feb 2021 14:44)  T(F): 97.7 (06 Feb 2021 21:15), Max: 99.1 (06 Feb 2021 14:44)  HR: 60 (07 Feb 2021 04:49) (60 - 90)  BP: 122/92 (06 Feb 2021 21:15) (122/92 - 123/75)  BP(mean): --  RR: 16 (07 Feb 2021 04:49) (16 - 24)  SpO2: 100% (07 Feb 2021 04:49) (99% - 100%)

## 2021-02-07 NOTE — DISCHARGE NOTE PROVIDER - NSDCCPCAREPLAN_GEN_ALL_CORE_FT
PRINCIPAL DISCHARGE DIAGNOSIS  Diagnosis: 2019 novel coronavirus disease (COVID-19)  Assessment and Plan of Treatment: 2019 novel coronavirus disease (COVID-19)      SECONDARY DISCHARGE DIAGNOSES  Diagnosis: Lethargic  Assessment and Plan of Treatment:

## 2021-10-06 PROBLEM — I10 ESSENTIAL HYPERTENSION: Status: ACTIVE | Noted: 2017-08-08
